# Patient Record
Sex: FEMALE | Race: WHITE | NOT HISPANIC OR LATINO | URBAN - METROPOLITAN AREA
[De-identification: names, ages, dates, MRNs, and addresses within clinical notes are randomized per-mention and may not be internally consistent; named-entity substitution may affect disease eponyms.]

---

## 2021-03-31 DIAGNOSIS — Z23 ENCOUNTER FOR IMMUNIZATION: ICD-10-CM

## 2022-02-18 ENCOUNTER — OFFICE VISIT (OUTPATIENT)
Dept: PLASTIC SURGERY | Facility: CLINIC | Age: 47
End: 2022-02-18
Payer: COMMERCIAL

## 2022-02-18 DIAGNOSIS — J34.89 NASAL OBSTRUCTION: Primary | ICD-10-CM

## 2022-02-18 PROCEDURE — 99203 OFFICE O/P NEW LOW 30 MIN: CPT | Performed by: STUDENT IN AN ORGANIZED HEALTH CARE EDUCATION/TRAINING PROGRAM

## 2022-02-18 RX ORDER — TRAZODONE HYDROCHLORIDE 50 MG/1
50 TABLET ORAL
Status: ON HOLD | COMMUNITY
End: 2022-07-18 | Stop reason: SDUPTHER

## 2022-02-18 RX ORDER — MULTIVIT-MIN/IRON FUM/FOLIC AC 7.5 MG-4
1 TABLET ORAL DAILY
COMMUNITY

## 2022-02-18 RX ORDER — ESTERIFIED ESTROGEN AND METHYLTESTOSTERONE 1.25; 2.5 MG/1; MG/1
1 TABLET ORAL DAILY
COMMUNITY
End: 2022-07-13

## 2022-02-21 NOTE — PROGRESS NOTES
Plastic Surgery Consult    Reason for visit: Patient presents for consultation for facial feminization    HPI:  Patient is a 39 y/o MTF transgendered woman who presents for facial feminization  She has multiple areas that she would like addressed including her hairline, forehead, nose, chin, and chest  I asked her what is her primary area of focus and she mentioned her nose as she also has some obstructive sleep symptoms  She is getting a sleep study in the next week  She states that she suffered multiple traumas to her nose as child and has had some nasal obstructive symptoms that are constant and unchanged with medication or seasonality  There is obvious deviation of her nose  She is interested in making it more feminine by reducing the dorsal hump, less projected  She presents for further management  ROS: 12 pt ROS negative, except as otherwise noted in HPI  PMH: none  FamHx: none  SurgHx: none  SocHx: on/off smoking for 10 years, social ETOH  Meds: hormones, finasteride  Allergies: PCN, sulfa    PE:  There were no vitals filed for this visit      General: NC/AT, breathing comfortably on RA  Neuro: CN II-XII grossly intact, symmetric reflexes  HEENT: PERRLA, EOMI, external ears normal, no lesions or deformities, neck supple, trachea midline  Respiratory: CTAB, normal respiratory effort  Cardio: RRR, normal S1, S2, no murmur, rubs, gallops  GI: soft, non-tender, non-distended  Extremities/MSK: normal alignment, mobility, gait, no edema  Skin: no rashes, lesions, subcutaneous nodules    Nasal exam:  Deviated dorsal septum to the left  Asymmetry and bulbous LLCs  +[place] bilaterally, improvement in airway passage with internal stenting with cotton swab  Intranasal exam, S-shaped septum    A/P:47 y/o MTF transgendered female who presents for facial feminization with highest priority of the nose as it is both a functional and cosmetic issue   -I discussed with her that we can straighten the nose, place  grafts to stent the internal nasal valves to improve breathing, and can make the nose more feminine by reducing dorsal hump, cephalic trim, and underrotating tip   -Most importantly, I discussed the need to stop smoking  I instructed her to stop smoking for at least 6 weeks prior at which time she will be tested with cotinine test prior to scheduling any surgical intervention            Calleen Buddle, MD   [address] [place] [address] Plastic and Reconstructive Surgery   [address]   ASIM, [address]   Office: [phone]

## 2022-07-06 ENCOUNTER — HOSPITAL ENCOUNTER (INPATIENT)
Facility: HOSPITAL | Age: 47
LOS: 6 days | Discharge: NURSING, REHAB OR LONG-TERM CARE | DRG: 481 | End: 2022-07-13
Attending: EMERGENCY MEDICINE | Admitting: ORTHOPAEDIC SURGERY
Payer: COMMERCIAL

## 2022-07-06 ENCOUNTER — APPOINTMENT (EMERGENCY)
Dept: RADIOLOGY | Facility: HOSPITAL | Age: 47
DRG: 481 | End: 2022-07-06
Payer: COMMERCIAL

## 2022-07-06 DIAGNOSIS — S72.009A FEMORAL NECK FRACTURE (HCC): ICD-10-CM

## 2022-07-06 DIAGNOSIS — S72.002A CLOSED FRACTURE OF NECK OF LEFT FEMUR, INITIAL ENCOUNTER (HCC): Primary | ICD-10-CM

## 2022-07-06 DIAGNOSIS — Z78.9 MALE-TO-FEMALE TRANSGENDER PERSON: ICD-10-CM
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
## 2022-07-06 PROCEDURE — 73552 X-RAY EXAM OF FEMUR 2/>: CPT

## 2022-07-06 PROCEDURE — 99285 EMERGENCY DEPT VISIT HI MDM: CPT | Performed by: EMERGENCY MEDICINE

## 2022-07-06 PROCEDURE — 73502 X-RAY EXAM HIP UNI 2-3 VIEWS: CPT

## 2022-07-06 PROCEDURE — 90715 TDAP VACCINE 7 YRS/> IM: CPT

## 2022-07-06 PROCEDURE — 99285 EMERGENCY DEPT VISIT HI MDM: CPT

## 2022-07-06 PROCEDURE — 90471 IMMUNIZATION ADMIN: CPT

## 2022-07-06 PROCEDURE — 71045 X-RAY EXAM CHEST 1 VIEW: CPT

## 2022-07-06 RX ORDER — FENTANYL CITRATE 50 UG/ML
50 INJECTION, SOLUTION INTRAMUSCULAR; INTRAVENOUS ONCE
Status: COMPLETED | OUTPATIENT
Start: 2022-07-06 | End: 2022-07-06

## 2022-07-06 RX ORDER — FENTANYL CITRATE 50 UG/ML
1 INJECTION, SOLUTION INTRAMUSCULAR; INTRAVENOUS ONCE
Status: COMPLETED | OUTPATIENT
Start: 2022-07-06 | End: 2022-07-06

## 2022-07-06 RX ADMIN — FENTANYL CITRATE 50 MCG: 50 INJECTION INTRAMUSCULAR; INTRAVENOUS at 22:37

## 2022-07-06 RX ADMIN — TETANUS TOXOID, REDUCED DIPHTHERIA TOXOID AND ACELLULAR PERTUSSIS VACCINE, ADSORBED 0.5 ML: 5; 2.5; 8; 8; 2.5 SUSPENSION INTRAMUSCULAR at 22:38

## 2022-07-07 ENCOUNTER — APPOINTMENT (INPATIENT)
Dept: RADIOLOGY | Facility: HOSPITAL | Age: 47
DRG: 481 | End: 2022-07-07
Payer: COMMERCIAL

## 2022-07-07 ENCOUNTER — ANESTHESIA (INPATIENT)
Dept: PERIOP | Facility: HOSPITAL | Age: 47
DRG: 481 | End: 2022-07-07
Payer: COMMERCIAL

## 2022-07-07 ENCOUNTER — ANESTHESIA EVENT (INPATIENT)
Dept: PERIOP | Facility: HOSPITAL | Age: 47
DRG: 481 | End: 2022-07-07
Payer: COMMERCIAL

## 2022-07-07 PROBLEM — G47.33 OSA (OBSTRUCTIVE SLEEP APNEA): Chronic | Status: ACTIVE | Noted: 2022-07-07

## 2022-07-07 PROBLEM — F12.90 MARIJUANA USE: Status: ACTIVE | Noted: 2022-07-07

## 2022-07-07 PROBLEM — Z78.9 MALE-TO-FEMALE TRANSGENDER PERSON: Status: ACTIVE | Noted: 2022-07-07

## 2022-07-07 PROBLEM — S72.002A CLOSED DISPLACED FRACTURE OF LEFT FEMORAL NECK (HCC): Status: ACTIVE | Noted: 2022-07-07

## 2022-07-07 PROBLEM — IMO0001 SMOKING: Status: ACTIVE | Noted: 2022-07-07

## 2022-07-07 PROBLEM — F17.200 SMOKING: Status: ACTIVE | Noted: 2022-07-07

## 2022-07-07 PROBLEM — Z01.818 PRE-OPERATIVE EXAMINATION: Status: ACTIVE | Noted: 2022-07-07

## 2022-07-07 LAB
ABO GROUP BLD: NORMAL
ABO GROUP BLD: NORMAL
ANION GAP SERPL CALCULATED.3IONS-SCNC: 5 MMOL/L (ref 4–13)
APTT PPP: 28 SECONDS (ref 23–37)
ATRIAL RATE: 74 BPM
BASOPHILS # BLD AUTO: 0.09 THOUSANDS/ΜL (ref 0–0.1)
BASOPHILS NFR BLD AUTO: 1 % (ref 0–1)
BLD GP AB SCN SERPL QL: NEGATIVE
BUN SERPL-MCNC: 17 MG/DL (ref 5–25)
CALCIUM SERPL-MCNC: 8.8 MG/DL (ref 8.3–10.1)
CHLORIDE SERPL-SCNC: 106 MMOL/L (ref 100–108)
CO2 SERPL-SCNC: 25 MMOL/L (ref 21–32)
CREAT SERPL-MCNC: 0.93 MG/DL (ref 0.6–1.3)
EOSINOPHIL # BLD AUTO: 0.19 THOUSAND/ΜL (ref 0–0.61)
EOSINOPHIL NFR BLD AUTO: 1 % (ref 0–6)
ERYTHROCYTE [DISTWIDTH] IN BLOOD BY AUTOMATED COUNT: 13 % (ref 11.6–15.1)
GLUCOSE SERPL-MCNC: 106 MG/DL (ref 65–140)
HCT VFR BLD AUTO: 38.2 % (ref 36.5–46.1)
HGB BLD-MCNC: 12.4 G/DL (ref 12–15.4)
IMM GRANULOCYTES # BLD AUTO: 0.14 THOUSAND/UL (ref 0–0.2)
IMM GRANULOCYTES NFR BLD AUTO: 1 % (ref 0–2)
INR PPP: 0.97 (ref 0.84–1.19)
LYMPHOCYTES # BLD AUTO: 2.49 THOUSANDS/ΜL (ref 0.6–4.47)
LYMPHOCYTES NFR BLD AUTO: 13 % (ref 14–44)
MCH RBC QN AUTO: 31.3 PG (ref 26.8–34.3)
MCHC RBC AUTO-ENTMCNC: 32.5 G/DL (ref 31.4–37.4)
MCV RBC AUTO: 97 FL (ref 82–98)
MONOCYTES # BLD AUTO: 0.91 THOUSAND/ΜL (ref 0.17–1.22)
MONOCYTES NFR BLD AUTO: 5 % (ref 4–12)
NEUTROPHILS # BLD AUTO: 15.05 THOUSANDS/ΜL (ref 1.85–7.62)
NEUTS SEG NFR BLD AUTO: 79 % (ref 43–75)
NRBC BLD AUTO-RTO: 0 /100 WBCS
P AXIS: 73 DEGREES
PLATELET # BLD AUTO: 284 THOUSANDS/UL (ref 149–390)
PMV BLD AUTO: 9.3 FL (ref 8.9–12.7)
POTASSIUM SERPL-SCNC: 3.8 MMOL/L (ref 3.5–5.3)
PR INTERVAL: 190 MS
PROTHROMBIN TIME: 12.5 SECONDS (ref 11.6–14.5)
QRS AXIS: 80 DEGREES
QRSD INTERVAL: 90 MS
QT INTERVAL: 374 MS
QTC INTERVAL: 415 MS
RBC # BLD AUTO: 3.96 MILLION/UL (ref 3.88–5.12)
RH BLD: POSITIVE
RH BLD: POSITIVE
SODIUM SERPL-SCNC: 136 MMOL/L (ref 136–145)
SPECIMEN EXPIRATION DATE: NORMAL
T WAVE AXIS: 83 DEGREES
VENTRICULAR RATE: 74 BPM
WBC # BLD AUTO: 18.87 THOUSAND/UL (ref 4.31–10.16)

## 2022-07-07 PROCEDURE — 99222 1ST HOSP IP/OBS MODERATE 55: CPT | Performed by: ORTHOPAEDIC SURGERY

## 2022-07-07 PROCEDURE — C1713 ANCHOR/SCREW BN/BN,TIS/BN: HCPCS | Performed by: ORTHOPAEDIC SURGERY

## 2022-07-07 PROCEDURE — 85025 COMPLETE CBC W/AUTO DIFF WBC: CPT | Performed by: EMERGENCY MEDICINE

## 2022-07-07 PROCEDURE — C1769 GUIDE WIRE: HCPCS | Performed by: ORTHOPAEDIC SURGERY

## 2022-07-07 PROCEDURE — G1004 CDSM NDSC: HCPCS

## 2022-07-07 PROCEDURE — 27236 TREAT THIGH FRACTURE: CPT | Performed by: ORTHOPAEDIC SURGERY

## 2022-07-07 PROCEDURE — 73700 CT LOWER EXTREMITY W/O DYE: CPT

## 2022-07-07 PROCEDURE — 86901 BLOOD TYPING SEROLOGIC RH(D): CPT | Performed by: PHYSICIAN ASSISTANT

## 2022-07-07 PROCEDURE — 85730 THROMBOPLASTIN TIME PARTIAL: CPT | Performed by: PHYSICIAN ASSISTANT

## 2022-07-07 PROCEDURE — 93005 ELECTROCARDIOGRAM TRACING: CPT

## 2022-07-07 PROCEDURE — 0QSC04Z REPOSITION LEFT LOWER FEMUR WITH INTERNAL FIXATION DEVICE, OPEN APPROACH: ICD-10-PCS | Performed by: ORTHOPAEDIC SURGERY

## 2022-07-07 PROCEDURE — 99253 IP/OBS CNSLTJ NEW/EST LOW 45: CPT | Performed by: INTERNAL MEDICINE

## 2022-07-07 PROCEDURE — 36415 COLL VENOUS BLD VENIPUNCTURE: CPT | Performed by: EMERGENCY MEDICINE

## 2022-07-07 PROCEDURE — 86900 BLOOD TYPING SEROLOGIC ABO: CPT | Performed by: PHYSICIAN ASSISTANT

## 2022-07-07 PROCEDURE — 80048 BASIC METABOLIC PNL TOTAL CA: CPT | Performed by: EMERGENCY MEDICINE

## 2022-07-07 PROCEDURE — 86850 RBC ANTIBODY SCREEN: CPT | Performed by: PHYSICIAN ASSISTANT

## 2022-07-07 PROCEDURE — 73502 X-RAY EXAM HIP UNI 2-3 VIEWS: CPT

## 2022-07-07 PROCEDURE — 93010 ELECTROCARDIOGRAM REPORT: CPT | Performed by: INTERNAL MEDICINE

## 2022-07-07 PROCEDURE — 85610 PROTHROMBIN TIME: CPT | Performed by: PHYSICIAN ASSISTANT

## 2022-07-07 DEVICE — DHS®/DCS® COMPRESSION SCREW 36MM-STERILE
Type: IMPLANTABLE DEVICE | Site: HIP | Status: FUNCTIONAL
Brand: DHS

## 2022-07-07 RX ORDER — FENTANYL CITRATE 50 UG/ML
INJECTION, SOLUTION INTRAMUSCULAR; INTRAVENOUS AS NEEDED
Status: DISCONTINUED | OUTPATIENT
Start: 2022-07-07 | End: 2022-07-08

## 2022-07-07 RX ORDER — CEFAZOLIN SODIUM 1 G/3ML
INJECTION, POWDER, FOR SOLUTION INTRAMUSCULAR; INTRAVENOUS AS NEEDED
Status: DISCONTINUED | OUTPATIENT
Start: 2022-07-07 | End: 2022-07-08

## 2022-07-07 RX ORDER — OXYCODONE HYDROCHLORIDE 10 MG/1
10 TABLET ORAL EVERY 4 HOURS PRN
Status: DISCONTINUED | OUTPATIENT
Start: 2022-07-07 | End: 2022-07-10

## 2022-07-07 RX ORDER — ACETAMINOPHEN 325 MG/1
975 TABLET ORAL EVERY 8 HOURS SCHEDULED
Status: DISCONTINUED | OUTPATIENT
Start: 2022-07-07 | End: 2022-07-13 | Stop reason: HOSPADM

## 2022-07-07 RX ORDER — POLYETHYLENE GLYCOL 3350 17 G/17G
17 POWDER, FOR SOLUTION ORAL DAILY
Status: DISCONTINUED | OUTPATIENT
Start: 2022-07-07 | End: 2022-07-13 | Stop reason: HOSPADM

## 2022-07-07 RX ORDER — CEFAZOLIN SODIUM 2 G/50ML
2000 SOLUTION INTRAVENOUS
Status: CANCELLED | OUTPATIENT
Start: 2022-07-07 | End: 2022-07-08

## 2022-07-07 RX ORDER — ENOXAPARIN SODIUM 100 MG/ML
40 INJECTION SUBCUTANEOUS DAILY
Status: DISCONTINUED | OUTPATIENT
Start: 2022-07-07 | End: 2022-07-08

## 2022-07-07 RX ORDER — TRAZODONE HYDROCHLORIDE 50 MG/1
50 TABLET ORAL
Status: DISCONTINUED | OUTPATIENT
Start: 2022-07-07 | End: 2022-07-13 | Stop reason: HOSPADM

## 2022-07-07 RX ORDER — ONDANSETRON 2 MG/ML
4 INJECTION INTRAMUSCULAR; INTRAVENOUS EVERY 6 HOURS PRN
Status: DISCONTINUED | OUTPATIENT
Start: 2022-07-07 | End: 2022-07-13 | Stop reason: HOSPADM

## 2022-07-07 RX ORDER — SIMETHICONE 80 MG
80 TABLET,CHEWABLE ORAL 4 TIMES DAILY PRN
Status: DISCONTINUED | OUTPATIENT
Start: 2022-07-07 | End: 2022-07-13 | Stop reason: HOSPADM

## 2022-07-07 RX ORDER — SODIUM CHLORIDE, SODIUM LACTATE, POTASSIUM CHLORIDE, CALCIUM CHLORIDE 600; 310; 30; 20 MG/100ML; MG/100ML; MG/100ML; MG/100ML
INJECTION, SOLUTION INTRAVENOUS CONTINUOUS PRN
Status: DISCONTINUED | OUTPATIENT
Start: 2022-07-07 | End: 2022-07-08

## 2022-07-07 RX ORDER — LIDOCAINE HYDROCHLORIDE 10 MG/ML
INJECTION, SOLUTION EPIDURAL; INFILTRATION; INTRACAUDAL; PERINEURAL AS NEEDED
Status: DISCONTINUED | OUTPATIENT
Start: 2022-07-07 | End: 2022-07-07

## 2022-07-07 RX ORDER — SODIUM CHLORIDE, SODIUM GLUCONATE, SODIUM ACETATE, POTASSIUM CHLORIDE, MAGNESIUM CHLORIDE, SODIUM PHOSPHATE, DIBASIC, AND POTASSIUM PHOSPHATE .53; .5; .37; .037; .03; .012; .00082 G/100ML; G/100ML; G/100ML; G/100ML; G/100ML; G/100ML; G/100ML
125 INJECTION, SOLUTION INTRAVENOUS CONTINUOUS
Status: DISCONTINUED | OUTPATIENT
Start: 2022-07-07 | End: 2022-07-08

## 2022-07-07 RX ORDER — PROPOFOL 10 MG/ML
INJECTION, EMULSION INTRAVENOUS AS NEEDED
Status: DISCONTINUED | OUTPATIENT
Start: 2022-07-07 | End: 2022-07-08

## 2022-07-07 RX ORDER — NICOTINE 21 MG/24HR
1 PATCH, TRANSDERMAL 24 HOURS TRANSDERMAL DAILY
Status: DISCONTINUED | OUTPATIENT
Start: 2022-07-07 | End: 2022-07-13 | Stop reason: HOSPADM

## 2022-07-07 RX ORDER — SENNOSIDES 8.6 MG
1 TABLET ORAL DAILY
Status: DISCONTINUED | OUTPATIENT
Start: 2022-07-07 | End: 2022-07-13 | Stop reason: HOSPADM

## 2022-07-07 RX ORDER — DOCUSATE SODIUM 100 MG/1
100 CAPSULE, LIQUID FILLED ORAL 2 TIMES DAILY
Status: DISCONTINUED | OUTPATIENT
Start: 2022-07-07 | End: 2022-07-13 | Stop reason: HOSPADM

## 2022-07-07 RX ORDER — DEXAMETHASONE SODIUM PHOSPHATE 10 MG/ML
INJECTION, SOLUTION INTRAMUSCULAR; INTRAVENOUS AS NEEDED
Status: DISCONTINUED | OUTPATIENT
Start: 2022-07-07 | End: 2022-07-08

## 2022-07-07 RX ORDER — ROCURONIUM BROMIDE 10 MG/ML
INJECTION, SOLUTION INTRAVENOUS AS NEEDED
Status: DISCONTINUED | OUTPATIENT
Start: 2022-07-07 | End: 2022-07-08

## 2022-07-07 RX ORDER — MAGNESIUM HYDROXIDE/ALUMINUM HYDROXICE/SIMETHICONE 120; 1200; 1200 MG/30ML; MG/30ML; MG/30ML
30 SUSPENSION ORAL EVERY 6 HOURS PRN
Status: DISCONTINUED | OUTPATIENT
Start: 2022-07-07 | End: 2022-07-13 | Stop reason: HOSPADM

## 2022-07-07 RX ORDER — CALCIUM CARBONATE 200(500)MG
1000 TABLET,CHEWABLE ORAL DAILY PRN
Status: DISCONTINUED | OUTPATIENT
Start: 2022-07-07 | End: 2022-07-13 | Stop reason: HOSPADM

## 2022-07-07 RX ORDER — OXYCODONE HYDROCHLORIDE 5 MG/1
5 TABLET ORAL EVERY 4 HOURS PRN
Status: DISCONTINUED | OUTPATIENT
Start: 2022-07-07 | End: 2022-07-10

## 2022-07-07 RX ORDER — HYDROMORPHONE HCL/PF 1 MG/ML
0.5 SYRINGE (ML) INJECTION
Status: DISCONTINUED | OUTPATIENT
Start: 2022-07-07 | End: 2022-07-10

## 2022-07-07 RX ADMIN — ROCURONIUM BROMIDE 50 MG: 10 INJECTION INTRAVENOUS at 23:15

## 2022-07-07 RX ADMIN — OXYCODONE HYDROCHLORIDE 10 MG: 10 TABLET ORAL at 02:24

## 2022-07-07 RX ADMIN — OXYCODONE HYDROCHLORIDE 10 MG: 10 TABLET ORAL at 07:56

## 2022-07-07 RX ADMIN — LIDOCAINE HYDROCHLORIDE 100 MG: 20 INJECTION INTRAVENOUS at 23:14

## 2022-07-07 RX ADMIN — ACETAMINOPHEN 975 MG: 325 TABLET, FILM COATED ORAL at 13:16

## 2022-07-07 RX ADMIN — SODIUM CHLORIDE, SODIUM GLUCONATE, SODIUM ACETATE, POTASSIUM CHLORIDE, MAGNESIUM CHLORIDE, SODIUM PHOSPHATE, DIBASIC, AND POTASSIUM PHOSPHATE 125 ML/HR: .53; .5; .37; .037; .03; .012; .00082 INJECTION, SOLUTION INTRAVENOUS at 04:46

## 2022-07-07 RX ADMIN — HYDROMORPHONE HYDROCHLORIDE 0.5 MG: 1 INJECTION, SOLUTION INTRAMUSCULAR; INTRAVENOUS; SUBCUTANEOUS at 05:47

## 2022-07-07 RX ADMIN — HYDROMORPHONE HYDROCHLORIDE 0.5 MG: 1 INJECTION, SOLUTION INTRAMUSCULAR; INTRAVENOUS; SUBCUTANEOUS at 02:43

## 2022-07-07 RX ADMIN — HYDROMORPHONE HYDROCHLORIDE 0.5 MG: 1 INJECTION, SOLUTION INTRAMUSCULAR; INTRAVENOUS; SUBCUTANEOUS at 08:53

## 2022-07-07 RX ADMIN — DEXAMETHASONE SODIUM PHOSPHATE 10 MG: 10 INJECTION, SOLUTION INTRAMUSCULAR; INTRAVENOUS at 23:39

## 2022-07-07 RX ADMIN — OXYCODONE HYDROCHLORIDE 10 MG: 10 TABLET ORAL at 13:15

## 2022-07-07 RX ADMIN — OXYCODONE HYDROCHLORIDE 10 MG: 10 TABLET ORAL at 20:56

## 2022-07-07 RX ADMIN — SODIUM CHLORIDE, SODIUM GLUCONATE, SODIUM ACETATE, POTASSIUM CHLORIDE, MAGNESIUM CHLORIDE, SODIUM PHOSPHATE, DIBASIC, AND POTASSIUM PHOSPHATE 125 ML/HR: .53; .5; .37; .037; .03; .012; .00082 INJECTION, SOLUTION INTRAVENOUS at 21:07

## 2022-07-07 RX ADMIN — HYDROMORPHONE HYDROCHLORIDE 0.5 MG: 1 INJECTION, SOLUTION INTRAMUSCULAR; INTRAVENOUS; SUBCUTANEOUS at 18:30

## 2022-07-07 RX ADMIN — ACETAMINOPHEN 975 MG: 325 TABLET, FILM COATED ORAL at 05:46

## 2022-07-07 RX ADMIN — FENTANYL CITRATE 50 MCG: 50 INJECTION INTRAMUSCULAR; INTRAVENOUS at 23:15

## 2022-07-07 RX ADMIN — PROPOFOL 150 MG: 10 INJECTION, EMULSION INTRAVENOUS at 23:15

## 2022-07-07 RX ADMIN — SODIUM CHLORIDE, SODIUM LACTATE, POTASSIUM CHLORIDE, AND CALCIUM CHLORIDE: .6; .31; .03; .02 INJECTION, SOLUTION INTRAVENOUS at 23:08

## 2022-07-07 RX ADMIN — CEFAZOLIN 1000 MG: 1 INJECTION, POWDER, FOR SOLUTION INTRAMUSCULAR; INTRAVENOUS at 23:18

## 2022-07-07 RX ADMIN — FENTANYL CITRATE 50 MCG: 50 INJECTION INTRAMUSCULAR; INTRAVENOUS at 23:47

## 2022-07-07 RX ADMIN — SODIUM CHLORIDE, SODIUM GLUCONATE, SODIUM ACETATE, POTASSIUM CHLORIDE, MAGNESIUM CHLORIDE, SODIUM PHOSPHATE, DIBASIC, AND POTASSIUM PHOSPHATE 125 ML/HR: .53; .5; .37; .037; .03; .012; .00082 INJECTION, SOLUTION INTRAVENOUS at 13:16

## 2022-07-07 NOTE — H&P
Orthopedics   [facility] Perez 40 y o  adult MRN: [id number]  Unit/Bed#: X ray      Chief Complaint:   left hip pain    HPI:   40 y  o adult community ambulator status post ground level fall complaining of left hip pain and inability to bear weight  She was skateboarding when she lost her balance falling onto her left side  Denies headstrike, LOC, no blood thinners  Only c/o left hip pain  Pain is dull in character, Located groin, acute in onset, constant in duration, severe in intensity, Exacerbating factors movement of hip, Remitting factors immobilization, nonradiating, no numbness or tingling, no open wounds noted  Occupation:  for [employer]  Pmhx significant for: male to female transgender, nicotine dependence     Review Of Systems:   · Skin: Normal  · Neuro: See HPI  · Musculoskeletal: See HPI  · 14 point review of systems negative except as stated above     Past Medical History:   History reviewed  No pertinent past medical history  Past Surgical History:   History reviewed  No pertinent surgical history  Family History:  Family history reviewed and non-contributory  History reviewed  No pertinent family history      Social History:  Social History     Socioeconomic History    Marital status: Single     Spouse name: None    Number of children: None    Years of education: None    Highest education level: None   Occupational History    None   Tobacco Use    Smoking status: Current Every Day Smoker     Types: Cigarettes    Smokeless tobacco: None   Substance and Sexual Activity    Alcohol use: Not Currently    Drug use: Never    Sexual activity: Never   Other Topics Concern    None   Social History Narrative    None     Social Determinants of Health     Financial Resource Strain: Not on file   Food Insecurity: Not on file   Transportation Needs: Not on file   Physical Activity: Not on file   Stress: Not on file   Social Connections: Not on file   Intimate Partner Violence: Not on file   Housing Stability: Not on file       Allergies:    Allergies   Allergen Reactions    Penicillins Rash and Fever    Sulfa Antibiotics Rash and Fever           Labs:  0   Lab Value Date/Time    HCT 38 2 07/07/2022 0008    HGB 12 4 07/07/2022 0008    WBC 18 87 (H) 07/07/2022 0008       Meds:    Current Facility-Administered Medications:     acetaminophen (TYLENOL) tablet 975 mg, 975 mg, Oral, Q8H [facility], Danielle Anton MD    aluminum-magnesium hydroxide-simethicone (MYLANTA) oral suspension 30 mL, 30 mL, Oral, Q6H PRN, Danielle Anton MD    calcium carbonate (TUMS) chewable tablet 1,000 mg, 1,000 mg, Oral, Daily PRN, Danielle Anton MD    docusate sodium (COLACE) capsule 100 mg, 100 mg, Oral, BID, Danielle Anton MD    enoxaparin (LOVENOX) subcutaneous injection 40 mg, 40 mg, Subcutaneous, Daily, Danielle Anton MD    HYDROmorphone (DILAUDID) injection 0 5 mg, 0 5 mg, Intravenous, Q3H PRN, Danielle Anton MD    multi-electrolyte (PLASMALYTE-A/ISOLYTE-S PH 7 4) IV solution, 125 mL/hr, Intravenous, Continuous, Danielle Anton MD    nicotine (NICODERM CQ) 14 mg/24hr TD 24 hr patch 1 patch, 1 patch, Transdermal, Daily, Danielle Anton MD    ondansetron [facility]) injection 4 mg, 4 mg, Intravenous, Q6H PRN, Danielle Anton MD    oxyCODONE (ROXICODONE) immediate release tablet 10 mg, 10 mg, Oral, Q4H PRN, Danielle Anton MD, 10 mg at 07/07/22 0224    oxyCODONE (ROXICODONE) IR tablet 5 mg, 5 mg, Oral, Q4H PRN, Danielle Anton MD    polyethylene glycol (MIRALAX) packet 17 g, 17 g, Oral, Daily, Danielle Anton MD    senna (SENOKOT) tablet 8 6 mg, 1 tablet, Oral, Daily, Danielle Anton MD    [facility]) chewable tablet 80 mg, 80 mg, Oral, 4x Daily PRN, Danielle Anton MD    Current Outpatient Medications:     estrogens, conjugated,-methyltestosterone (ESTRATEST) 1 25-2 5 MG per tablet, Take 1 tablet by mouth daily, Disp: , Rfl:    Multiple Vitamins-Minerals (multivitamin with minerals) tablet, Take 1 tablet by mouth daily, Disp: , Rfl:     progesterone (ENDOMETRIN) 100 MG vaginal insert, Insert 200 mg into the vagina 2 (two) times a day, Disp: , Rfl:     traZODone (DESYREL) 50 mg tablet, Take 50 mg by mouth daily at bedtime, Disp: , Rfl:     Blood Culture:   No results found for: BLOODCX    Wound Culture:   No results found for: WOUNDCULT    Ins and Outs:  No intake/output data recorded  Physical Exam:   /80 (BP Location: Right arm)   Pulse 76   Temp 98 2 °F (36 8 °C) (Oral)   Resp 18   Ht 5' 11" (1 803 m)   Wt 76 2 kg (168 lb)   SpO2 97%   BMI 23 43 kg/m²   Gen: Alert and oriented to person, place, time  HEENT: EOMI, eyes clear, moist mucus membranes, hearing intact  Respiratory: Bilateral chest rise  No audible wheezing found  Cardiovascular: Regular Rate and Rhythm  Abdomen: soft nontender/nondistended  Musculoskeletal: left lower extremity  · Skin intact  · Tender to palpation over anterior hip  · Pain with int/external rotation  · Sensation intact L3-S1  · Positive ankle dorsi/plantar flexion, EHL/FHL  · 2+ DP pulse  · No other pain upon palpation of other extremities    Radiology:   I personally reviewed the films  X-rays left hip shows transcervical femoral neck fracture    Assessment:  40 y  o adult status post fall while skateboarding with a displaced left femoral neck fracture   She would benefit from a total hip arthroplasty vs operative fixation    Plan:   · Non weight bearing left lower extremity  · CT left hip to better delineate the fx  · Analgesics for pain  · Informed consent obtained  · Pre op labs in ED  · NPO   · Anderson Catheter insertion  · Medicine consult for all medical management and preoperative risk stratification  · To OR for Anterior total hip arthroplasty vs operative fixation  · Dispo: admit to marcial Fneton MD

## 2022-07-07 NOTE — CASE MANAGEMENT
Case Management Assessment & Discharge Planning Note    Patient name Michelle Falcon  Location [address] [id number] MRN [id number]  : 1975 Date 2022       Current Admission Date: 2022  Current Admission Diagnosis:Pre-operative examination   Patient Active Problem List    Diagnosis Date Noted    Closed displaced fracture of left femoral neck ([address] ) 2022    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 2022    Pre-operative examination 2022      LOS (days): 0  Geometric Mean LOS (GMLOS) (days):   Days to GMLOS:     OBJECTIVE:    Risk of Unplanned Readmission Score: 8 16      Current admission status: Inpatient     Preferred Pharmacy:   [address]  [address]  [address] [facility] [address]  Phone: [phone] Fax: [phone] [facility], [address] [facility] [address] [facility]  Phone: [phone] Fax: [phone]    Primary Care Provider: Pt reports having a PCP via [facility] but is unable to identify their name at this time  Primary Insurance: BLUE CROSS  Secondary Insurance:     ASSESSMENT:  Active Health Care Proxies     quinones, Sarah[address] [facility] Representative - Friend   Primary Phone: [phone] (Mobile)                Patient Information  Admitted from[de-identified] Home  Mental Status: Alert  During Assessment patient was accompanied by: Not accompanied during assessment  Assessment information provided by[de-identified] Patient  Primary Caregiver: Self  Support Systems: [place] of Residence: [facility] do you live [place] [facility] [address] [place] [address] Street entry access options   Select all that apply : Stairs  Number of steps to enter home : 5  Do the steps have railings?: Yes  Type of Current Residence: Apartment  Floor Level: 2  Upon entering residence, is there a bedroom on the main floor (no further steps)?: Yes  Upon entering residence, is there a bathroom on the main floor (no further steps)?: Yes  Number of steps to 2nd floor from main floor: One Flight  In the last 12 months, was there a time when you were not able to pay the mortgage or rent on time?: No  In the last 12 months, was there a time when you did not have a steady place to sleep or slept in a shelter (including now)?: No  Homeless/housing insecurity resource given?: N/A  Living Arrangements: Lives w/ Friend  Is patient a ?: No    Activities of Daily Living Prior to Admission  Functional Status: Independent  Completes ADLs independently?: Yes  Ambulates independently?: Yes  Does patient use assisted devices?: No  Does patient currently own DME?: Yes  What DME does the patient currently own?: Straight Cane  Does patient have a history of Outpatient Therapy (PT/OT)?: No  Does the patient have a history of Short-Term Rehab?: No  Does patient have a history of HHC?: No  Does patient currently have [facility]?: No    Patient Information Continued  Income Source: Employed (Pt works FT and has the ability to work from home )  Does patient have prescription coverage?: Yes  Within the past 12 months, you worried that your food would run out before you got the money to buy more : Never true  Within the past 12 months, the food you bought just didn't last and you didn't have money to get more : Never true  Food insecurity resource given?: N/A  Does patient receive dialysis treatments?: No  Does patient have a history of substance abuse?: No  Does patient have a history of Mental Health Diagnosis?: No    Means of Transportation  Means of Transport to [facility][de-identified] Public Transportation - Bus (Pt does not have her PA 's license or own a vehicle )  In the past 12 months, has lack of transportation kept you from medical appointments or from getting medications?: No  In the past 12 months, has lack of transportation kept you from meetings, work, or from getting things needed for daily living?: No  Was application for public transport provided?: N/A    DISCHARGE DETAILS:    Discharge planning discussed with[de-identified] Patient at bedside  Freedom of Choice: Yes     CM contacted family/caregiver?: No- see comments (Declined)  Were Treatment Team discharge recommendations reviewed with patient/caregiver?: Yes  Did patient/caregiver verbalize understanding of patient care needs?: Yes  Were patient/caregiver advised of the risks associated with not following Treatment Team discharge recommendations?: Yes    Contacts  Patient Contacts: Self, Aliofe Perez  Contact Method: In Person  Reason/Outcome: Discharge [address]         Is the patient interested in [facility] at discharge?: No     Discharge Destination Plan[de-identified] Home (pending PT/OT recommendations)      Additional Comments: Pt is expected to undergo OR today with the ortho team  CM will follow for discharge planning recommendations upon return from the OR  CM will remain available

## 2022-07-07 NOTE — ED ATTENDING ATTESTATION
7/6/2022  IDenia MD, saw and evaluated the patient  I have discussed the patient with the resident/non-physician practitioner and agree with the resident's/non-physician practitioner's findings, Plan of Care, and MDM as documented in the resident's/non-physician practitioner's note, except where noted  All available labs and Radiology studies were reviewed  I was present for key portions of any procedure(s) performed by the resident/non-physician practitioner and I was immediately available to provide assistance  At this point I agree with the current assessment done in the Emergency Department  I have conducted an independent evaluation of this patient a history and physical is as follows:    ED Course     29-year-old male to female transgender, presenting to the emergency department for evaluation after fall  Patient was skateboarding when she lost her balance causing her to fall onto her left elbow and left hip  Patient denies hitting her head  Negative LOC  Patient has an abrasion to the left elbow controlled  Patient primarily complains of left hip pain  No neck pain, back pain, chest pain, abdominal pain  Ten systems reviewed and negative except as noted  The patient is resting comfortably on a stretcher in no acute respiratory distress  The patient appears nontoxic  HEENT reveals moist mucous membranes  Head is normocephalic and atraumatic  Conjunctiva and sclera are normal  Neck is nontender and supple with full range of motion to flexion, extension, lateral rotation  No meningismus appreciated  No masses are appreciated  Lungs are clear to auscultation bilaterally without any wheezes, rales or rhonchi  Heart is regular rate and rhythm without any murmurs, rubs or gallops  Abdomen is soft and nontender without any rebound or guarding  Abrasion to the olecranon surface of the left elbow  Patient has full range of motion to flexion extension and pronation and supination  Nontender to palpation over the medial and lateral epicondyle  Patient has diminished range of motion of the left hip secondary to pain  Patient is tender to palpation over the lateral aspect of the left hip  Stable pelvis  Left knee is unremarkable    Patient is awake, alert, and oriented x3  The patient has normal interaction  Cranial nerves 2-12 are intact  Motor is 5 out of 5 bilateral upper lower extremities  Normal sensation  XR hip/pelv 2-3 vws left if performed    (Results Pending)   XR femur 2 views LEFT    (Results Pending)   X-rays independently visualized by myself demonstrates a left femoral neck fracture              Critical Care Time  Procedures

## 2022-07-07 NOTE — PLAN OF CARE
Problem: MOBILITY - ADULT  Goal: Maintain or return to baseline ADL function  Description: INTERVENTIONS:  -  Assess patient's ability to carry out ADLs; assess patient's baseline for ADL function and identify physical deficits which impact ability to perform ADLs (bathing, care of mouth/teeth, toileting, grooming, dressing, etc )  - Assess/evaluate cause of self-care deficits   - Assess range of motion  - Assess patient's mobility; develop plan if impaired  - Assess patient's need for assistive devices and provide as appropriate  - Encourage maximum independence but intervene and supervise when necessary  - Involve family in performance of ADLs  - Assess for home care needs following discharge   - Consider OT consult to assist with ADL evaluation and planning for discharge  - Provide patient education as appropriate  Outcome: Progressing  Goal: Maintains/Returns to pre admission functional level  Description: INTERVENTIONS:  - Perform BMAT or MOVE assessment daily    - Set and communicate daily mobility goal to care team and patient/family/caregiver  - Collaborate with rehabilitation services on mobility goals if consulted  - Perform Range of Motion 3 times a day  - Reposition patient every 3 hours    - Dangle patient 3 times a day  - Stand patient 3 times a day  - Ambulate patient 3 times a day  - Out of bed to chair 3 times a day   - Out of bed for meals 3 times a day  - Out of bed for toileting  - Record patient progress and toleration of activity level   Outcome: Progressing     Problem: Potential for Falls  Goal: Patient will remain free of falls  Description: INTERVENTIONS:  - Educate patient/family on patient safety including physical limitations  - Instruct patient to call for assistance with activity   - Consult OT/PT to assist with strengthening/mobility   - Keep Call bell within reach  - Keep bed low and locked with side rails adjusted as appropriate  - Keep care items and personal belongings within reach  - Initiate and maintain comfort rounds  - Make Fall Risk Sign visible to staff  - Offer Toileting every  Hours, in advance of need  - Initiate/Maintain alarm  - Obtain necessary fall risk management equipment:   - Apply yellow socks and bracelet for high fall risk patients  - Consider moving patient to room near nurses station  Outcome: Progressing

## 2022-07-07 NOTE — QUICK NOTE
Orthopedics PreOp Note  Aliofe Perez 40 y o  adult MRN: [id number]  Unit/Bed#: -01      Dx:  Left displaced femoral neck fracture  Procedure:  Open reduction internal fixation left femur    Hgb:  12 4  Plt:  284   Wbc:  18 87    Na:  136  K:  3 8  Cl:  106  Co2:  24  BUN:  17  Cr:  0 93  Gluc:  106    PTT:  28  INR:  0 97  PT:  12 5        CXR:  2022  EK    Abx:  Ancef on hold prior to surgery  Type and Screen/Cross:  2022  NPO:  Midnight 2022  Consent: 2022  POA Name/ Phone: Kendell Murray (Friend)   [phone] (Mobile)  Clearance: cleared  Anticoagulation:none

## 2022-07-07 NOTE — ED NOTES
Dr Sanjuana Paz made aware that 3 attempts at natarajan catheter made by different RN's   Dr Sanjuana Paz said to wait on natarajan at this time      Xiomara Cedillo  07/07/22 7121

## 2022-07-07 NOTE — CONSULTS
[facility] 1975, 40 y o  adult MRN: [id number]  Unit/Bed#: ED 01 Encounter: [id number]  Primary Care Provider: No primary care provider on file  Date and time admitted to hospital: 7/6/2022  9:48 PM    Inpatient consult to Internal Medicine  Consult performed by: Elena Ng MD  Consult ordered by: Donald Anton MD          * Pre-operative examination  Assessment & Plan  Patient's level of activity is good  He denies having any shortness breath or dyspnea on exertion; no orthopnea  No history of liver disease  No history of operative complications however patient has had any experience with major operative procedures or anesthesia  In any case, patient having no cardiac concerns, neurologic events or hepatic disease most likely can go for intended procedure  Minimal risk  Closed displaced fracture of left femoral neck (HCC)  Assessment & Plan  As per primary orthopedic service  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  Assessment & Plan  Currently on hormonal treatment; on hold for intended surgery  Perla Fuentes is a 40 y o  adult who has past medical history significant for transgender male to female as well as depression who comes in as a patient of orthopedics due to a skateboarding accident  Patient denies any loss of consciousness  We are asked by Orthopedics to assess patient for preoperative risk stratification  Patient has not had any major surgeries  No exposure to anesthesia  However, the patient is of good health without any cardiovascular risk factors  No shortness of breath or dyspnea on exertion  No history of CAD or chest pains  No history of CVA  No history of hepatic disease      Review of Systems:    Constitutional:  Denies fever or chills   Eyes:  Denies change in visual acuity   HENT:  Denies nasal congestion or sore throat   Respiratory:  Denies cough or shortness of breath   Cardiovascular: Denies chest pain or edema   GI:  Denies abdominal pain, nausea, vomiting, bloody stools or diarrhea   :  Denies dysuria   Musculoskeletal:  Denies back pain or joint pain   Integument:  Denies rash   Neurologic:  Denies headache, focal weakness or sensory changes   Endocrine:  Denies polyuria or polydipsia   Lymphatic:  Denies swollen glands   Psychiatric:  Denies depression or anxiety     Past Medical and Surgical History:     History reviewed  No pertinent past medical history  History reviewed  No pertinent surgical history  Meds/Allergies:    (Not in a hospital admission)      Allergies: Allergies   Allergen Reactions    Penicillins Rash and Fever    Sulfa Antibiotics Rash and Fever     History:     Marital Status: Single    Substance Use History:   Social History     Substance and Sexual Activity   Alcohol Use Not Currently     Social History     Tobacco Use   Smoking Status Current Every Day Smoker    Types: Cigarettes   Smokeless Tobacco Not on file     Social History     Substance and Sexual Activity   Drug Use Never       Family History:    History reviewed  No pertinent family history  Physical Exam:     Vitals:   Blood Pressure: 136/80 (07/07/22 0015)  Pulse: 76 (07/07/22 0015)  Temperature: 98 2 °F (36 8 °C) (07/06/22 2145)  Temp Source: Oral (07/06/22 2145)  Respirations: 18 (07/07/22 0015)  Height: 5' 11" (180 3 cm) (07/06/22 2145)  Weight - Scale: 76 2 kg (168 lb) (07/06/22 2145)  SpO2: 97 % (07/07/22 0015)    Constitutional:  Well developed, well nourished, no acute distress, non-toxic appearance   Eyes:  PERRL, conjunctiva normal   HENT:  Atraumatic, external ears normal, nose normal, oropharynx moist, no pharyngeal exudates   Neck- normal range of motion, no tenderness, supple   Respiratory:  No respiratory distress, normal breath sounds, no rales, no wheezing   Cardiovascular:  Normal rate, normal rhythm, no murmurs, no gallops, no rubs   GI:  Soft, nondistended, normal bowel sounds, nontender, no organomegaly, no mass, no rebound, no guarding   :  No costovertebral angle tenderness   Musculoskeletal:  No edema, tenderness left hip, shortened left lower extremity  Back- no tenderness  Integument:  Well hydrated, no rash   Lymphatic:  No lymphadenopathy noted   Neurologic:  Alert & oriented x 3, CN 2-12 normal, normal motor function, normal sensory function, no focal deficits noted   Psychiatric:  Speech and behavior appropriate       Lab Results: I Have Reviewed All Lab Data Below:    Results from last 7 days   Lab Units 07/07/22  0008   WBC Thousand/uL 18 87*   HEMOGLOBIN g/dL 12 4   HEMATOCRIT % 38 2   PLATELETS Thousands/uL 284   NEUTROS PCT % 79*   LYMPHS PCT % 13*   MONOS PCT % 5   EOS PCT % 1     Results from last 7 days   Lab Units 07/07/22  0008   POTASSIUM mmol/L 3 8   CHLORIDE mmol/L 106   CO2 mmol/L 25   BUN mg/dL 17   CREATININE mg/dL 0 93   CALCIUM mg/dL 8 8           * Additional Pertinent Lab Tests Reviewed: [place] [address] Admission Reviewed    Imaging: I have personally reviewed pertinent reports  No results found  Counseling / Coordination of Care Time: 15 minutes  Greater than 50% of total time spent on patient counseling and coordination of care  Collaboration of Care:  Were Recommendations Directly Discussed with Primary Treatment Team? - Yes     ** Please Note: Dragon 360 Dictation speech to text software was used in the creation of this document **

## 2022-07-07 NOTE — OCCUPATIONAL THERAPY NOTE
Occupational Therapy         Patient Name: Gaby Fulton  Today's Date: 7/7/2022 07/07/22 0700   OT Last Visit   OT Visit Date 07/07/22   Note Type   Note type Cancelled Session   Cancel Reasons Patient to operating room     Jarek Wright OT

## 2022-07-07 NOTE — ED PROVIDER NOTES
History  Chief Complaint   Patient presents with    Hip Injury - Major     Pt fell onto L side while skateboarding, pt now c/o hip pain, - headstrike      81-year-old transgender female patient presenting with left hip injury status post skateboarding  Patient states 30 minutes prior to arrival, she was skateboarding and she fell on her left side  Patient states that she fell on her left elbow and her left hip  Patient states that she was able to get back up but was not able to bear weight on her left leg  Patient also has an abrasion on her left elbow  Patient received 100 mg of fentanyl on the way to ED  Denies a injury to knee, left ankle, head, or any other injuries  Denies fever, chest pain, shortness of breath, abd pain, n/v           Prior to Admission Medications   Prescriptions Last Dose Informant Patient Reported? Taking? Multiple Vitamins-Minerals (multivitamin with minerals) tablet  Self Yes No   Sig: Take 1 tablet by mouth daily   estrogens, conjugated,-methyltestosterone (ESTRATEST) 1 25-2 5 MG per tablet  Self Yes No   Sig: Take 1 tablet by mouth daily   progesterone (ENDOMETRIN) 100 MG vaginal insert  Self Yes No   Sig: Insert 200 mg into the vagina 2 (two) times a day   traZODone (DESYREL) 50 mg tablet  Self Yes No   Sig: Take 50 mg by mouth daily at bedtime      Facility-Administered Medications: None       History reviewed  No pertinent past medical history  History reviewed  No pertinent surgical history  History reviewed  No pertinent family history  I have reviewed and agree with the history as documented  E-Cigarette/Vaping     E-Cigarette/Vaping Substances     Social History     Tobacco Use    Smoking status: Current Every Day Smoker     Types: Cigarettes   Substance Use Topics    Alcohol use: Not Currently    Drug use: Never        Review of Systems   Musculoskeletal:        Left hip pain, left elbow abrasion   Neurological: Negative for syncope     All other systems reviewed and are negative  Physical Exam  ED Triage Vitals   Temperature Pulse Respirations Blood Pressure SpO2   07/06/22 2145 07/06/22 2145 07/06/22 2145 07/06/22 2145 07/06/22 2145   98 2 °F (36 8 °C) 81 20 130/78 100 %      Temp Source Heart Rate Source Patient Position - Orthostatic VS BP Location FiO2 (%)   07/06/22 2145 07/06/22 2145 07/07/22 0015 07/07/22 0015 --   Oral Monitor Lying Right arm       Pain Score       07/06/22 2145       10 - Worst Possible Pain             Orthostatic Vital Signs  Vitals:    07/07/22 0600 07/07/22 0700 07/07/22 0759 07/07/22 1413   BP: 101/60 102/57 127/72 113/65   Pulse: 74 68 85 81   Patient Position - Orthostatic VS: Lying          Physical Exam  Vitals reviewed  Constitutional:       Appearance: Normal appearance  HENT:      Head: Normocephalic and atraumatic  Nose: Nose normal       Mouth/Throat:      Mouth: Mucous membranes are moist       Pharynx: Oropharynx is clear  Eyes:      Extraocular Movements: Extraocular movements intact  Conjunctiva/sclera: Conjunctivae normal    Cardiovascular:      Rate and Rhythm: Normal rate and regular rhythm  Pulses: Normal pulses  Heart sounds: Normal heart sounds  Pulmonary:      Effort: Pulmonary effort is normal       Breath sounds: Normal breath sounds  Abdominal:      General: Bowel sounds are normal       Palpations: Abdomen is soft  Tenderness: There is no abdominal tenderness  Musculoskeletal:         General: Tenderness (Tenderness to left lateral upper thigh, left hip) and signs of injury present  No deformity (No obvious deformity)  Cervical back: Normal range of motion  Comments: Limited range of motion of left hip  Mild rotation but limited flexion, extension, abduction  Skin:     General: Skin is warm and dry  Comments: Left elbow abrasion   Neurological:      General: No focal deficit present        Mental Status: She is alert and oriented to person, place, and time  Mental status is at baseline           ED Medications  Medications   multi-electrolyte (PLASMALYTE-A/ISOLYTE-S PH 7 4) IV solution (125 mL/hr Intravenous New Bag 7/7/22 1316)   docusate sodium (COLACE) capsule 100 mg (100 mg Oral Not Given 7/7/22 0800)   senna (SENOKOT) tablet 8 6 mg (8 6 mg Oral Not Given 7/7/22 0801)   polyethylene glycol (MIRALAX) packet 17 g (17 g Oral Not Given 7/7/22 0801)   ondansetron (ZOFRAN) injection 4 mg (has no administration in time range)   aluminum-magnesium hydroxide-simethicone (MYLANTA) oral suspension 30 mL (has no administration in time range)   calcium carbonate (TUMS) chewable tablet 1,000 mg (has no administration in time range)   simethicone (MYLICON) chewable tablet 80 mg (has no administration in time range)   nicotine (NICODERM CQ) 14 mg/24hr TD 24 hr patch 1 patch (1 patch Transdermal Not Given 7/7/22 0801)   enoxaparin (LOVENOX) subcutaneous injection 40 mg (40 mg Subcutaneous Not Given 7/7/22 0800)   acetaminophen (TYLENOL) tablet 975 mg (975 mg Oral Given 7/7/22 1316)   oxyCODONE (ROXICODONE) IR tablet 5 mg (has no administration in time range)   oxyCODONE (ROXICODONE) immediate release tablet 10 mg (10 mg Oral Given 7/7/22 1315)   HYDROmorphone (DILAUDID) injection 0 5 mg (0 5 mg Intravenous Given 7/7/22 0853)   traZODone (DESYREL) tablet 50 mg (has no administration in time range)   multivitamin-minerals (CENTRUM) tablet 1 tablet (1 tablet Oral Not Given 7/7/22 0801)   fentanyl citrate (PF) (FOR EMS ONLY) 100 mcg/2 mL injection 100 mcg (0 mcg Does not apply Given to EMS 7/6/22 2232)   fentanyl citrate (PF) 100 MCG/2ML 50 mcg (50 mcg Intravenous Given 7/6/22 2237)   tetanus-diphtheria-acellular pertussis (BOOSTRIX) IM injection 0 5 mL (0 5 mL Intramuscular Given 7/6/22 2238)       Diagnostic Studies  Results Reviewed     Procedure Component Value Units Date/Time    Protime-INR [[id number]]  (Normal) Collected: 07/07/22 1872    Lab Status: Final result Specimen: Blood from Arm, Left Updated: 07/07/22 0530     Protime 12 5 seconds      INR 0 97    APTT [[id number]]  (Normal) Collected: 07/07/22 0444    Lab Status: Final result Specimen: Blood from Arm, Left Updated: 07/07/22 0530     PTT 28 seconds     Basic metabolic panel [[id number]] Collected: 07/07/22 0008    Lab Status: Final result Specimen: Blood from Arm, Left Updated: 07/07/22 0042     Sodium 136 mmol/L      Potassium 3 8 mmol/L      Chloride 106 mmol/L      CO2 25 mmol/L      ANION GAP 5 mmol/L      BUN 17 mg/dL      Creatinine 0 93 mg/dL      Glucose 106 mg/dL      Calcium 8 8 mg/dL      eGFR --    Narrative:      Notes:     1  eGFR calculation is only valid for adults 18 years and older  2  EGFR calculation cannot be performed for patients who are transgender, non-binary, or whose legal sex, sex at birth, and gender identity differ  CBC and differential [[id number]]  (Abnormal) Collected: 07/07/22 0008    Lab Status: Final result Specimen: Blood from Arm, Left Updated: 07/07/22 0019     WBC 18 87 Thousand/uL      RBC 3 96 Million/uL      Hemoglobin 12 4 g/dL      Hematocrit 38 2 %      MCV 97 fL      MCH 31 3 pg      MCHC 32 5 g/dL      RDW 13 0 %      MPV 9 3 fL      Platelets 737 Thousands/uL      nRBC 0 /100 WBCs      Neutrophils Relative 79 %      Immat GRANS % 1 %      Lymphocytes Relative 13 %      Monocytes Relative 5 %      Eosinophils Relative 1 %      Basophils Relative 1 %      Neutrophils Absolute 15 05 Thousands/µL      Immature Grans Absolute 0 14 Thousand/uL      Lymphocytes Absolute 2 49 Thousands/µL      Monocytes Absolute 0 91 Thousand/µL      Eosinophils Absolute 0 19 Thousand/µL      Basophils Absolute 0 09 Thousands/µL                  CT lower extremity wo contrast left   Final Result by Ana Diaz, DO (07/07 5799)   FINDINGS/IMPRESSION:      OSSEOUS STRUCTURES:  Comminuted fracture of the left femoral neck with posterior angulation  No hip dislocation    Bones otherwise appear intact  VISUALIZED MUSCULATURE:  Unremarkable  SOFT TISSUES:  Soft tissue swelling about the left hip      OTHER PERTINENT FINDINGS:  None  Workstation performed: [id number]         XR hip/pelv 2-3 vws left if performed   Final Result by Wilfred Kerr DO (07/07 1305)      Mildly displaced left proximal femoral fracture junction of the basicervical neck and greater trochanteric region  Workstation performed: [id number]         XR femur 2 views LEFT   Final Result by Wilfred Kerr DO (07/07 1301)      Negative for distal left femoral fracture  Please see separate report of left hip also on this date  Workstation performed: [id number]         XR chest 1 view   Final Result by Wilfred Kerr DO (07/07 1307)      No acute cardiopulmonary disease  Workstation performed: [id number]               Procedures  Procedures      ED Course                                       MDM  Number of Diagnoses or Management Options  Fall  Femoral neck fracture [facility])  Diagnosis management comments: 41 y/o transgender female patient presenting with left hip and left thigh injury s/p fall onset today  On exam, patient has limited ROM of left hip  Xray positive for left femoral neck fx  Labs show leukocytosis  Admitted to orthopedics for left femoral neck fracture  Pain tx with fentanyl          Amount and/or Complexity of Data Reviewed  Clinical lab tests: ordered and reviewed  Tests in the radiology section of CPT®: ordered and reviewed        Disposition  Final diagnoses:   Femoral neck fracture ([facility] [address] )   Fall     Time reflects when diagnosis was documented in both MDM as applicable and the Disposition within this note     Time User Action Codes Description Comment    7/7/2022  1:07 AM Marina Anton Add [S72 002A] Closed fracture of neck of left femur, initial encounter ([facility] [address] )     7/7/2022  5:13 PM Elta Day Nghia Add [S72 009A] Femoral neck fracture ([address] )     7/7/2022  5:13 PM Shailesh CONTRERAS [facility] Add [G05  XXXA] Fall       ED Disposition     ED Disposition   Admit    Condition   Stable    Date/Time   Thu Jul 7, 2022  5:12 PM    Comment   Case was discussed with Dr Sanjuana Paz and the patient's admission status was agreed to be inpatient status to the service of Dr Mariia Gar           Follow-up Information    None         Current Discharge Medication List      CONTINUE these medications which have NOT CHANGED    Details   estrogens, conjugated,-methyltestosterone (ESTRATEST) 1 25-2 5 MG per tablet Take 1 tablet by mouth daily      Multiple Vitamins-Minerals (multivitamin with minerals) tablet Take 1 tablet by mouth daily      progesterone (ENDOMETRIN) 100 MG vaginal insert Insert 200 mg into the vagina 2 (two) times a day      traZODone (DESYREL) 50 mg tablet Take 50 mg by mouth daily at bedtime           Outpatient Discharge Orders   Comprehensive metabolic panel   Standing Status: Future Standing Exp  Date: 07/07/23     Sedimentation rate, automated   Standing Status: Future Standing Exp  Date: 07/07/23     TSH, 3rd generation   Standing Status: Future Standing Exp  Date: 07/07/23     PTH, intact   Standing Status: Future Standing Exp  Date: 07/07/23       PDMP Review     None           ED Provider  Attending physically available and evaluated Michelle Falcon I managed the patient along with the ED Attending      Electronically Signed by         Magui García MD  07/07/22 0488

## 2022-07-08 LAB
ANION GAP SERPL CALCULATED.3IONS-SCNC: 11 MMOL/L (ref 4–13)
BASOPHILS # BLD AUTO: 0.01 THOUSANDS/ΜL (ref 0–0.1)
BASOPHILS NFR BLD AUTO: 0 % (ref 0–1)
BUN SERPL-MCNC: 7 MG/DL (ref 5–25)
CALCIUM SERPL-MCNC: 7 MG/DL (ref 8.3–10.1)
CHLORIDE SERPL-SCNC: 102 MMOL/L (ref 100–108)
CO2 SERPL-SCNC: 23 MMOL/L (ref 21–32)
CREAT SERPL-MCNC: 0.58 MG/DL (ref 0.6–1.3)
EOSINOPHIL # BLD AUTO: 0 THOUSAND/ΜL (ref 0–0.61)
EOSINOPHIL NFR BLD AUTO: 0 % (ref 0–6)
ERYTHROCYTE [DISTWIDTH] IN BLOOD BY AUTOMATED COUNT: 12.6 % (ref 11.6–15.1)
GLUCOSE SERPL-MCNC: 119 MG/DL (ref 65–140)
HCT VFR BLD AUTO: 26.5 % (ref 36.5–46.1)
HGB BLD-MCNC: 8.9 G/DL (ref 12–15.4)
IMM GRANULOCYTES # BLD AUTO: 0.06 THOUSAND/UL (ref 0–0.2)
IMM GRANULOCYTES NFR BLD AUTO: 1 % (ref 0–2)
LYMPHOCYTES # BLD AUTO: 0.8 THOUSANDS/ΜL (ref 0.6–4.47)
LYMPHOCYTES NFR BLD AUTO: 9 % (ref 14–44)
MCH RBC QN AUTO: 31.6 PG (ref 26.8–34.3)
MCHC RBC AUTO-ENTMCNC: 33.6 G/DL (ref 31.4–37.4)
MCV RBC AUTO: 94 FL (ref 82–98)
MONOCYTES # BLD AUTO: 0.51 THOUSAND/ΜL (ref 0.17–1.22)
MONOCYTES NFR BLD AUTO: 6 % (ref 4–12)
NEUTROPHILS # BLD AUTO: 7.76 THOUSANDS/ΜL (ref 1.85–7.62)
NEUTS SEG NFR BLD AUTO: 84 % (ref 43–75)
NRBC BLD AUTO-RTO: 0 /100 WBCS
PLATELET # BLD AUTO: 200 THOUSANDS/UL (ref 149–390)
PMV BLD AUTO: 9.4 FL (ref 8.9–12.7)
POTASSIUM SERPL-SCNC: 4.3 MMOL/L (ref 3.5–5.3)
RBC # BLD AUTO: 2.82 MILLION/UL (ref 3.88–5.12)
SODIUM SERPL-SCNC: 136 MMOL/L (ref 136–145)
WBC # BLD AUTO: 9.14 THOUSAND/UL (ref 4.31–10.16)

## 2022-07-08 PROCEDURE — 85025 COMPLETE CBC W/AUTO DIFF WBC: CPT | Performed by: PHYSICIAN ASSISTANT

## 2022-07-08 PROCEDURE — 97163 PT EVAL HIGH COMPLEX 45 MIN: CPT

## 2022-07-08 PROCEDURE — 80048 BASIC METABOLIC PNL TOTAL CA: CPT | Performed by: PHYSICIAN ASSISTANT

## 2022-07-08 PROCEDURE — NC001 PR NO CHARGE: Performed by: ORTHOPAEDIC SURGERY

## 2022-07-08 PROCEDURE — 97167 OT EVAL HIGH COMPLEX 60 MIN: CPT

## 2022-07-08 DEVICE — 4.5MM CORTEX SCREW 40MM: Type: IMPLANTABLE DEVICE | Site: HIP | Status: FUNCTIONAL

## 2022-07-08 DEVICE — DHS®/DCS® ONE-STEP LAG SCREW 12.7MM THREAD/95MM-STERILE
Type: IMPLANTABLE DEVICE | Site: HIP | Status: FUNCTIONAL
Brand: DHS

## 2022-07-08 DEVICE — 130 DEG DHS PLATE-STD BARREL 3 HOLES/62MM-STERILE
Type: IMPLANTABLE DEVICE | Site: HIP | Status: FUNCTIONAL
Brand: DHS

## 2022-07-08 DEVICE — 4.5MM CORTEX SCREW 38MM: Type: IMPLANTABLE DEVICE | Site: HIP | Status: FUNCTIONAL

## 2022-07-08 DEVICE — 6.5MM CANNULATED SCREW 16MM THREAD 95MM: Type: IMPLANTABLE DEVICE | Site: HIP | Status: FUNCTIONAL

## 2022-07-08 RX ORDER — HYDROMORPHONE HCL/PF 1 MG/ML
SYRINGE (ML) INJECTION AS NEEDED
Status: DISCONTINUED | OUTPATIENT
Start: 2022-07-08 | End: 2022-07-08

## 2022-07-08 RX ORDER — MAGNESIUM HYDROXIDE 1200 MG/15ML
LIQUID ORAL AS NEEDED
Status: DISCONTINUED | OUTPATIENT
Start: 2022-07-08 | End: 2022-07-08 | Stop reason: HOSPADM

## 2022-07-08 RX ORDER — FENTANYL CITRATE/PF 50 MCG/ML
50 SYRINGE (ML) INJECTION
Status: COMPLETED | OUTPATIENT
Start: 2022-07-08 | End: 2022-07-08

## 2022-07-08 RX ORDER — ONDANSETRON 2 MG/ML
INJECTION INTRAMUSCULAR; INTRAVENOUS AS NEEDED
Status: DISCONTINUED | OUTPATIENT
Start: 2022-07-08 | End: 2022-07-08

## 2022-07-08 RX ORDER — HYDROMORPHONE HCL/PF 1 MG/ML
0.5 SYRINGE (ML) INJECTION
Status: COMPLETED | OUTPATIENT
Start: 2022-07-08 | End: 2022-07-08

## 2022-07-08 RX ORDER — ONDANSETRON 2 MG/ML
4 INJECTION INTRAMUSCULAR; INTRAVENOUS ONCE AS NEEDED
Status: DISCONTINUED | OUTPATIENT
Start: 2022-07-08 | End: 2022-07-08

## 2022-07-08 RX ORDER — ENOXAPARIN SODIUM 100 MG/ML
30 INJECTION SUBCUTANEOUS EVERY 12 HOURS SCHEDULED
Status: DISCONTINUED | OUTPATIENT
Start: 2022-07-09 | End: 2022-07-13 | Stop reason: HOSPADM

## 2022-07-08 RX ORDER — CEFAZOLIN SODIUM 2 G/50ML
2000 SOLUTION INTRAVENOUS EVERY 8 HOURS
Status: COMPLETED | OUTPATIENT
Start: 2022-07-08 | End: 2022-07-08

## 2022-07-08 RX ORDER — DIPHENHYDRAMINE HYDROCHLORIDE 50 MG/ML
12.5 INJECTION INTRAMUSCULAR; INTRAVENOUS ONCE AS NEEDED
Status: DISCONTINUED | OUTPATIENT
Start: 2022-07-08 | End: 2022-07-08

## 2022-07-08 RX ORDER — MEPERIDINE HYDROCHLORIDE 25 MG/ML
12.5 INJECTION INTRAMUSCULAR; INTRAVENOUS; SUBCUTANEOUS
Status: DISCONTINUED | OUTPATIENT
Start: 2022-07-08 | End: 2022-07-08

## 2022-07-08 RX ORDER — GLYCOPYRROLATE 0.2 MG/ML
INJECTION INTRAMUSCULAR; INTRAVENOUS AS NEEDED
Status: DISCONTINUED | OUTPATIENT
Start: 2022-07-08 | End: 2022-07-08

## 2022-07-08 RX ORDER — METOCLOPRAMIDE HYDROCHLORIDE 5 MG/ML
10 INJECTION INTRAMUSCULAR; INTRAVENOUS ONCE AS NEEDED
Status: DISCONTINUED | OUTPATIENT
Start: 2022-07-08 | End: 2022-07-08

## 2022-07-08 RX ORDER — NEOSTIGMINE METHYLSULFATE 1 MG/ML
INJECTION INTRAVENOUS AS NEEDED
Status: DISCONTINUED | OUTPATIENT
Start: 2022-07-08 | End: 2022-07-08

## 2022-07-08 RX ADMIN — FENTANYL CITRATE 50 MCG: 50 INJECTION INTRAMUSCULAR; INTRAVENOUS at 00:13

## 2022-07-08 RX ADMIN — OXYCODONE HYDROCHLORIDE 10 MG: 10 TABLET ORAL at 15:52

## 2022-07-08 RX ADMIN — SENNOSIDES 8.6 MG: 8.6 TABLET, FILM COATED ORAL at 09:57

## 2022-07-08 RX ADMIN — OXYCODONE HYDROCHLORIDE 10 MG: 10 TABLET ORAL at 21:01

## 2022-07-08 RX ADMIN — NEOSTIGMINE METHYLSULFATE 2 MG: 1 INJECTION INTRAVENOUS at 02:15

## 2022-07-08 RX ADMIN — NEOSTIGMINE METHYLSULFATE 2 MG: 1 INJECTION INTRAVENOUS at 02:17

## 2022-07-08 RX ADMIN — HYDROMORPHONE HYDROCHLORIDE 0.5 MG: 1 INJECTION, SOLUTION INTRAMUSCULAR; INTRAVENOUS; SUBCUTANEOUS at 02:48

## 2022-07-08 RX ADMIN — ACETAMINOPHEN 975 MG: 325 TABLET, FILM COATED ORAL at 21:02

## 2022-07-08 RX ADMIN — HYDROMORPHONE HYDROCHLORIDE 0.5 MG: 1 INJECTION, SOLUTION INTRAMUSCULAR; INTRAVENOUS; SUBCUTANEOUS at 02:41

## 2022-07-08 RX ADMIN — ROCURONIUM BROMIDE 20 MG: 10 INJECTION INTRAVENOUS at 01:09

## 2022-07-08 RX ADMIN — TRAZODONE HYDROCHLORIDE 50 MG: 50 TABLET ORAL at 21:01

## 2022-07-08 RX ADMIN — HYDROMORPHONE HYDROCHLORIDE 0.5 MG: 1 INJECTION, SOLUTION INTRAMUSCULAR; INTRAVENOUS; SUBCUTANEOUS at 01:16

## 2022-07-08 RX ADMIN — ONDANSETRON 4 MG: 2 INJECTION INTRAMUSCULAR; INTRAVENOUS at 01:55

## 2022-07-08 RX ADMIN — NICOTINE 1 PATCH: 14 PATCH, EXTENDED RELEASE TRANSDERMAL at 09:57

## 2022-07-08 RX ADMIN — SODIUM CHLORIDE, SODIUM LACTATE, POTASSIUM CHLORIDE, AND CALCIUM CHLORIDE: .6; .31; .03; .02 INJECTION, SOLUTION INTRAVENOUS at 01:35

## 2022-07-08 RX ADMIN — HYDROMORPHONE HYDROCHLORIDE 0.5 MG: 1 INJECTION, SOLUTION INTRAMUSCULAR; INTRAVENOUS; SUBCUTANEOUS at 01:46

## 2022-07-08 RX ADMIN — GLYCOPYRROLATE 0.4 MG: 0.2 INJECTION, SOLUTION INTRAMUSCULAR; INTRAVENOUS at 02:17

## 2022-07-08 RX ADMIN — HYDROMORPHONE HYDROCHLORIDE 0.5 MG: 1 INJECTION, SOLUTION INTRAMUSCULAR; INTRAVENOUS; SUBCUTANEOUS at 03:10

## 2022-07-08 RX ADMIN — SODIUM CHLORIDE, SODIUM GLUCONATE, SODIUM ACETATE, POTASSIUM CHLORIDE, MAGNESIUM CHLORIDE, SODIUM PHOSPHATE, DIBASIC, AND POTASSIUM PHOSPHATE 125 ML/HR: .53; .5; .37; .037; .03; .012; .00082 INJECTION, SOLUTION INTRAVENOUS at 03:08

## 2022-07-08 RX ADMIN — CEFAZOLIN SODIUM 2000 MG: 2 SOLUTION INTRAVENOUS at 06:02

## 2022-07-08 RX ADMIN — MULTIPLE VITAMINS W/ MINERALS TAB 1 TABLET: TAB ORAL at 09:57

## 2022-07-08 RX ADMIN — ROCURONIUM BROMIDE 10 MG: 10 INJECTION INTRAVENOUS at 00:34

## 2022-07-08 RX ADMIN — Medication 50 MCG: at 02:34

## 2022-07-08 RX ADMIN — FENTANYL CITRATE 50 MCG: 50 INJECTION INTRAMUSCULAR; INTRAVENOUS at 00:33

## 2022-07-08 RX ADMIN — SODIUM CHLORIDE, SODIUM GLUCONATE, SODIUM ACETATE, POTASSIUM CHLORIDE, MAGNESIUM CHLORIDE, SODIUM PHOSPHATE, DIBASIC, AND POTASSIUM PHOSPHATE 125 ML/HR: .53; .5; .37; .037; .03; .012; .00082 INJECTION, SOLUTION INTRAVENOUS at 11:34

## 2022-07-08 RX ADMIN — OXYCODONE HYDROCHLORIDE 10 MG: 10 TABLET ORAL at 06:02

## 2022-07-08 RX ADMIN — ACETAMINOPHEN 975 MG: 325 TABLET, FILM COATED ORAL at 06:02

## 2022-07-08 RX ADMIN — HYDROMORPHONE HYDROCHLORIDE 0.5 MG: 1 INJECTION, SOLUTION INTRAMUSCULAR; INTRAVENOUS; SUBCUTANEOUS at 11:30

## 2022-07-08 RX ADMIN — CEFAZOLIN SODIUM 2000 MG: 2 SOLUTION INTRAVENOUS at 14:22

## 2022-07-08 RX ADMIN — DOCUSATE SODIUM 100 MG: 100 CAPSULE, LIQUID FILLED ORAL at 09:57

## 2022-07-08 RX ADMIN — ACETAMINOPHEN 975 MG: 325 TABLET, FILM COATED ORAL at 14:20

## 2022-07-08 RX ADMIN — Medication 50 MCG: at 02:39

## 2022-07-08 RX ADMIN — HYDROMORPHONE HYDROCHLORIDE 0.5 MG: 1 INJECTION, SOLUTION INTRAMUSCULAR; INTRAVENOUS; SUBCUTANEOUS at 03:02

## 2022-07-08 RX ADMIN — ROCURONIUM BROMIDE 20 MG: 10 INJECTION INTRAVENOUS at 00:01

## 2022-07-08 RX ADMIN — OXYCODONE HYDROCHLORIDE 10 MG: 10 TABLET ORAL at 10:10

## 2022-07-08 RX ADMIN — POLYETHYLENE GLYCOL 3350 17 G: 17 POWDER, FOR SOLUTION ORAL at 09:57

## 2022-07-08 RX ADMIN — GLYCOPYRROLATE 0.4 MG: 0.2 INJECTION, SOLUTION INTRAMUSCULAR; INTRAVENOUS at 02:15

## 2022-07-08 RX ADMIN — SUGAMMADEX 200 MG: 100 INJECTION, SOLUTION INTRAVENOUS at 02:21

## 2022-07-08 NOTE — DISCHARGE INSTRUCTIONS
Discharge Instructions - Orthopedics  Kath Perez 40 y o  adult MRN: [id number]  Unit/Bed#: -01    Weight Bearing Status: Toe touch weight bearing left lower extremity    DVT prophylaxis  Lovenox for 6 weeks    Pain:  Continue analgesics as directed    Dressing Instructions:   Please keep clean, dry and intact until follow up     Appt Instructions: If you do not have your appointment, please call the clinic at [phone] t  Otherwise followup as scheduled     Contact the office sooner if you experience any increased numbness/tingling in the extremities        Miscellaneous:  F/u at 2 weeks postop

## 2022-07-08 NOTE — PLAN OF CARE
Problem: MOBILITY - ADULT  Goal: Maintain or return to baseline ADL function  Description: INTERVENTIONS:  -  Assess patient's ability to carry out ADLs; assess patient's baseline for ADL function and identify physical deficits which impact ability to perform ADLs (bathing, care of mouth/teeth, toileting, grooming, dressing, etc )  - Assess/evaluate cause of self-care deficits   - Assess range of motion  - Assess patient's mobility; develop plan if impaired  - Assess patient's need for assistive devices and provide as appropriate  - Encourage maximum independence but intervene and supervise when necessary  - Involve family in performance of ADLs  - Assess for home care needs following discharge   - Consider OT consult to assist with ADL evaluation and planning for discharge  - Provide patient education as appropriate  7/8/2022 0408 by Laney Lovelace RN  Outcome: Progressing  7/8/2022 0407 by Laney Lovelace RN  Outcome: Progressing  Goal: Maintains/Returns to pre admission functional level  Description: INTERVENTIONS:  - Perform BMAT or MOVE assessment daily    - Set and communicate daily mobility goal to care team and patient/family/caregiver  - Collaborate with rehabilitation services on mobility goals if consulted  - Perform Range of Motion **3* times a day  - Reposition patient every *2** hours    - Dangle patient *3** times a day  - Stand patient **3* times a day  - Ambulate patient *3** times a day  - Out of bed to chair **3* times a day   - Out of bed for meals *3** times a day  - Out of bed for toileting  - Record patient progress and toleration of activity level   7/8/2022 0408 by Laney Lovelace RN  Outcome: Progressing  7/8/2022 0407 by Laney Lovelace RN  Outcome: Progressing

## 2022-07-08 NOTE — PHYSICAL THERAPY NOTE
Physical Therapy Evaluation     Patient's Name: Consuelo Catherine    Admitting Diagnosis  Closed fracture of neck of left femur, initial encounter ([facility] ) [S72 002A]  Major injury of hip [S79 547A]    Problem List  Patient Active Problem List   Diagnosis    Closed displaced fracture of left femoral neck (HCC)    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    Pre-operative examination    Smoking    Marijuana use    DISHA (obstructive sleep apnea)       Past Medical History  History reviewed  No pertinent past medical history  Past Surgical History  History reviewed  No pertinent surgical history  07/08/22 1142   PT Last Visit   PT Visit Date 07/08/22   Note Type   Note type Evaluation   Pain Assessment   Pain Assessment Tool 0-10   Pain Score No Pain  (@ rest, increases with mobility)   Hospital Pain Intervention(s) Repositioned; Ambulation/increased activity; Emotional support   Restrictions/Precautions   Weight Bearing Precautions Per Order Yes   LLE Weight Bearing Per Order TTWB   Other Precautions Multiple lines; Fall Risk;Pain;WBS   Home Living   Type of Home Apartment   Home Layout One level;Performs ADLs on one level; Able to live on main level with bedroom/bathroom;Stairs to enter with rails  ([address] building + additional 20 steps to 2nd floor apartment)   Bathroom Shower/Tub Tub/shower unit   [address]   (denies)   Prior Function   Level of [place] Independent with ADLs and functional mobility   Lives With Friend(s)  (roommate; reports that roommate is "never home")   Receives Help From Friend(s)   ADL Assistance Independent   IADLs Independent   Falls in the last 6 months 1 to 4  (leading to current admission)   Vocational Full time employment   General   Family/Caregiver Present No   Cognition   Overall Cognitive Status WFL   Arousal/Participation Cooperative   Orientation Level Oriented X4   Memory Within functional limits   Following Commands Follows one step commands without difficulty   Comments pt very pleasant and cooperative to participate in therapy session   RLE Assessment   RLE Assessment   (functionally 4+/5)   LLE Assessment   LLE Assessment   (functionally 3/5 limited 2* pain)   Bed Mobility   Supine to Sit 5  Supervision   Additional items Assist x 1;HOB elevated; Bedrails; Increased time required  (pt assists LLE with RLE)   Sit to Supine Unable to assess   Additional Comments pt supine in bed upon arrival  Pt returned to bedside chair with all needs within reach   Transfers   Sit to Stand 4  Minimal assistance   Additional items Assist x 1; Increased time required;Verbal cues   Stand to Sit 4  Minimal assistance   Additional items Assist x 1; Increased time required;Verbal cues   Additional Comments transfers with RW   Ambulation/Elevation   Gait pattern Excessively slow; Short stride; Foward flexed;Decreased foot clearance; Improper Weight shift  (mainted TTWB throughout gait trial)   Gait Assistance 4  Minimal assist   Additional items Assist x 1;Verbal cues; Tactile cues   Assistive Device Rolling walker   Distance 25ft x 2 trials   Stair Management Assistance Not tested   Balance   Static Sitting Fair +   Dynamic Sitting Fair +   Static Standing Fair -   Dynamic Standing Poor +   Ambulatory Poor +   Activity Tolerance   Activity Tolerance Patient tolerated treatment well   Medical Staff Made Aware OT; co-eval performed this date 2* increased medical complexity and multiple co-morbidities   Nurse Made Aware RN cleared pt to participate in therapy session   Assessment   Prognosis Good   Problem List Decreased strength;Decreased endurance; Impaired balance;Decreased mobility;Pain;Orthopedic restrictions   Assessment Pt seen for high complexity PT evaluation  Pt with active PT eval/treat orders  Pt is a 40 y o  female who was admitted to [facility] on 7/6/2022 s/p fall while skateboarding sustained L displaced femoral neck fx  Pt now s/p L ORIF- TTWB per ortho   Pt's current dx/ problem list include: male to female transgender person, DISHA  Comorbidities affecting pt's physical performance at time of assessment are as follows:  has no past medical history on file  Personal factors affecting pt at time of initial examination include: steps to enter environment, limited home support, past experience, inability to perform IADLs, inability to perform ADLs, inability to ambulate household distances, and recent fall(s)  Due to acute medical issues, ongoing medical workup for primary dx; pain, fall risk, increased reliance on more restrictive AD compared to baseline;  decreased activity tolerance compared to baseline, increased assistance needed from caregiver at current time, multiple lines, decline in overall functional mobility status; health management issues; note unstable clinical picture (high complexity)  At baseline, pt resides with roommate in 2nd floor apartment with ~25 steps to 2nd floor and was independent prior to hospital admission  Currently, upon initial examination, pt  is requiring S level A for bed mobility skills; min A for functional transfers and min A for ambulation with RW  Currently, pt presents functioning below baseline and with overall mobility deficits 2* to: decreased LE strength/AROM; limited flexibility;  generalized weakness/ deconditioning; decreased endurance; decreased activity tolerance; decreased coordination; impaired balance; gait deviations; fatigue; multiple lines; as well as : weakness, decreased ROM, impaired balance, gait deviations, pain, decreased activity tolerance, decreased functional mobility tolerance, fall risk and orthopedic restrictions  Pt currently at increased risk for falls  At the end of evaluation, pt was left sitting OOB in bedside chiar with all needs in reach   Pt would benefit from continued skilled PT services while in hospital to address remaining limitations and maximize functional potential  PT to continue to follow pt and recommends STR pending progress  The patient's AM-PAC Basic Mobility Inpatient Short Form Raw Score is 16  A Raw score of less than or equal to 16 suggests the patient may benefit from discharge to post-acute rehabilitation services  Please also refer to the recommendation of the Physical Therapist for safe discharge planning  Barriers to Discharge Inaccessible home environment;Decreased caregiver support   Goals   Patient Goals to go for a walk   STG Expiration Date 07/22/22   Short Term Goal #1 STG 1  Pt will be able to perform bed mobility tasks with mod I in order to improve overall functional mobility and assist in safe d/c  STG 2  Pt with sit EOB for at least 25 minutes at mod I level in order to strengthen abdominal musculature and assist in future transfers/ ambulation  STG 3  Pt will be able to perform functional transfer with mod I in order to improve overall functional mobility and assist in safe d/c  STG 4  Pt will be able to ambulate at least 250 feet with least restrictive device with mod I A in order to improve overall functional mobility and assist in safe d/c  STG 5  Pt will improve sitting/standing static/dynamic balance 1/2 grade in order to improve functional mobility and assist in safe d/c  STG 6  Pt will improve LE strength by 1/2 grade in order to improve functional mobility and assist in safe d/c  STG 7  Pt will be able to negotiate at least 20 stairs with least restrictive device with mod I A in order to improve overall functional mobility and assist in safe d/c    PT Treatment Day 0   Plan   Treatment/Interventions ADL retraining;Functional transfer training;LE strengthening/ROM; Patient/family training;Equipment eval/education; Bed mobility;Gait training;Spoke to nursing;Spoke to case management;OT   PT Frequency Other (Comment)  (3-6x/wk)   Recommendation   PT Discharge Recommendation Post acute rehabilitation services  (may benefit from PMR consult)   Equipment Recommended Desi Luu Walker Package Recommended Wheeled walker   AM-PAC Basic Mobility Inpatient   Turning in Bed Without Bedrails 3   Lying on Back to Sitting on Edge of Flat Bed 3   Moving Bed to Chair 3   Standing Up From Chair 3   Walk in Room 3   Climb 3-5 Stairs 1   Basic Mobility Inpatient Raw Score 16   Basic Mobility Standardized Score 38 32   Highest Level Of Mobility   JH-HLM Goal 5: Stand one or more mins   JH-HLM Achieved 7: Walk 25 feet or more         Pebbles nAayeli, PT

## 2022-07-08 NOTE — ARC ADMISSION
Referral received for consideration of patient for inpatient acute rehab  Will review patient's case with [facility] physician and update CM as able

## 2022-07-08 NOTE — CASE MANAGEMENT
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  Phone: [phone] Fax: [phone]    Primary Care Provider: No primary care provider on file  Primary Insurance: BLUE CROSS  Secondary Insurance:     DISCHARGE DETAILS:    Discharge planning discussed with[de-identified] Patient at bedside  Freedom of Choice: Yes  Comments - Freedom of Choice: List of acute rehab facilities given to pt at bedside  Other Referral/Resources/Interventions Provided:  Interventions: Acute Rehab  Referral Comments: CM advised by PT/OT team that pt would likely benefit from continued therapies upon medical stability  CM spoke with pt at bedside to discuss same and review choices  Pt is in agreement with placement and would prefer acute rehab facilities for further likelihood of patient/staff diversity within the hospital setting  Pt also in agreement with more "intense" therapy (i e , 3 hours of therapy per day) as she is motivated to participate in rehab   List of choices provided at bedside and pt is in agreement with a broad referral to guage availability in the area -- Of note, pt is particularly interested in [facility]  Referrals placed with provider's approval  Awaiting acceptances      Treatment Team Recommendation: Acute Rehab  Discharge Destination Plan[de-identified] Acute Rehab

## 2022-07-08 NOTE — PLAN OF CARE
Problem: PHYSICAL THERAPY ADULT  Goal: Performs mobility at highest level of function for planned discharge setting  See evaluation for individualized goals  Description: Treatment/Interventions: ADL retraining, Functional transfer training, LE strengthening/ROM, Patient/family training, Equipment eval/education, Bed mobility, Gait training, Spoke to nursing, Spoke to case management, OT  Equipment Recommended: Tong Noriega       See flowsheet documentation for full assessment, interventions and recommendations  Note: Prognosis: Good  Problem List: Decreased strength, Decreased endurance, Impaired balance, Decreased mobility, Pain, Orthopedic restrictions  Assessment: Pt seen for high complexity PT evaluation  Pt with active PT eval/treat orders  Pt is a 40 y o  female who was admitted to [facility] on 7/6/2022 s/p fall while skateboarding sustained L displaced femoral neck fx  Pt now s/p L ORIF- TTWB per ortho  Pt's current dx/ problem list include: male to female transgender person, DISHA  Comorbidities affecting pt's physical performance at time of assessment are as follows:  has no past medical history on file  Personal factors affecting pt at time of initial examination include: steps to enter environment, limited home support, past experience, inability to perform IADLs, inability to perform ADLs, inability to ambulate household distances, and recent fall(s)  Due to acute medical issues, ongoing medical workup for primary dx; pain, fall risk, increased reliance on more restrictive AD compared to baseline;  decreased activity tolerance compared to baseline, increased assistance needed from caregiver at current time, multiple lines, decline in overall functional mobility status; health management issues; note unstable clinical picture (high complexity)  At baseline, pt resides with roommate in 2nd floor apartment with ~25 steps to 2nd floor and was independent prior to hospital admission   Currently, upon initial examination, pt  is requiring S level A for bed mobility skills; min A for functional transfers and min A for ambulation with RW  Currently, pt presents functioning below baseline and with overall mobility deficits 2* to: decreased LE strength/AROM; limited flexibility;  generalized weakness/ deconditioning; decreased endurance; decreased activity tolerance; decreased coordination; impaired balance; gait deviations; fatigue; multiple lines; as well as : weakness, decreased ROM, impaired balance, gait deviations, pain, decreased activity tolerance, decreased functional mobility tolerance, fall risk and orthopedic restrictions  Pt currently at increased risk for falls  At the end of evaluation, pt was left sitting OOB in bedside chiar with all needs in reach  Pt would benefit from continued skilled PT services while in hospital to address remaining limitations and maximize functional potential  PT to continue to follow pt and recommends STR pending progress  The patient's AM-PAC Basic Mobility Inpatient Short Form Raw Score is 16  A Raw score of less than or equal to 16 suggests the patient may benefit from discharge to post-acute rehabilitation services  Please also refer to the recommendation of the Physical Therapist for safe discharge planning  Barriers to Discharge: Inaccessible home environment, Decreased caregiver support        PT Discharge Recommendation: Post acute rehabilitation services (may benefit from PMR consult)          See flowsheet documentation for full assessment

## 2022-07-08 NOTE — ANESTHESIA POSTPROCEDURE EVALUATION
Post-Op Assessment Note    CV Status:  Stable  Pain Score: 0    Pain management: adequate     Mental Status:  Alert and awake   Hydration Status:  Euvolemic and stable   PONV Controlled:  None   Airway Patency:  Patent      Post Op Vitals Reviewed: Yes      Staff: CRNA         No complications documented      /89 (07/08/22 0229)    Temp      Pulse 100 (07/08/22 0229)   Resp 18 (07/08/22 0229)    SpO2 99 % (07/08/22 0229)

## 2022-07-08 NOTE — UTILIZATION REVIEW
Initial Clinical Review    Admission: Date/Time/Statement:   Admission Orders (From admission, onward)     Ordered        07/07/22 0123  Inpatient Admission  Once                      Orders Placed This Encounter   Procedures    Inpatient Admission     Standing Status:   Standing     Number of Occurrences:   1     Order Specific Question:   Level of Care     Answer:   Med Surg [16]     Order Specific Question:   Estimated length of stay     Answer:   Inpatient Only Surgery     ED Arrival Information     Expected   7/6/2022     Arrival   7/6/2022 21:42    Acuity   Urgent            Means of arrival   Ambulance    Escorted by   [facility]    Service   Orthopedic Surgery    Admission type   Urgent            Arrival complaint   hip injury           Chief Complaint   Patient presents with    Hip Injury - Major     Pt fell onto L side while skateboarding, pt now c/o hip pain, - headstrike        Initial Presentation: 40 y o  who presented by EMS to [address] [place] [address] ED  Inpatient admission for evaluation and treatment of displaced L femoral neck fracture  Presented w/ L hip pain and inability to bear weight after falling off skateboard onto L side  No headstrike or LOC  On exam, anterior hip tender, pain w/ rotation internal/external, sensation intact  Plan: NWB LLE, analgesics, CT L hip for better image of fx, analgesics, NPO, natarajan, OR for total hip vs operative fixation  Hold hormonal treatment in preparation for surgical intervention  Date: 07/08/22   Day 2: POD#0 from L femoral neck ORIF  On exam, LLE dressing C/D/I, neurovascularly intact  Plan: toe touch WB LLE, PT/OT evals, analgesics, Lovenox, Trend labs, replete electrolytes as needed   DW, I&O      07/08/22 Surgery  Procedure: OPEN REDUCTION W/ INTERNAL FIXATION (ORIF) LEFT FEMORAL NECK FRACTURE (Left)  Indication: Closed fracture of neck of left femur, initial encounter ([facility] ) [S72 002A]  Anesthesia: General      ED Triage Vitals   Temperature Pulse Respirations Blood Pressure SpO2   07/06/22 2145 07/06/22 2145 07/06/22 2145 07/06/22 2145 07/06/22 2145   98 2 °F (36 8 °C) 81 20 130/78 100 %      Temp Source Heart Rate Source Patient Position - Orthostatic VS BP Location FiO2 (%)   07/06/22 2145 07/06/22 2145 07/07/22 0015 07/07/22 0015 --   Oral Monitor Lying Right arm       Pain Score       07/06/22 2145       10 - Worst Possible Pain          Wt Readings from Last 1 Encounters:   07/06/22 76 2 kg (168 lb)     Additional Vital Signs:   Date/Time Temp Pulse Resp BP MAP (mmHg) SpO2 Calculated FIO2 (%) - Nasal Cannula Nasal Cannula O2 Flow Rate  O2 Device   07/08/22 04:18:15 -- 81 -- 118/72 87 97 % -- -- --   07/08/22 03:48:15 98 °F (36 7 °C) 80 -- 127/78 94 95 % -- -- None (Room air)   07/08/22 0330 98 °F (36 7 °C) 84 30 Abnormal  140/86 103 97 % 28 2 L/min Nasal cannula   07/08/22 0315 -- 82 25 Abnormal  122/74 90 99 % -- -- --   07/08/22 0300 -- 80 30 Abnormal  132/78 93 100 % -- -- --   07/08/22 0245 -- 84 24 Abnormal  138/69 89 100 % -- -- --   07/08/22 0230 -- 85 16 139/98 119 100 % -- -- --   07/08/22 0229 -- 100 18 151/89 -- 99 % 36 4 L/min Nasal cannula   07/08/22 0227 97 2 °F (36 2 °C) 91 16 151/89 -- 99 % 36 4 L/min --   07/07/22 14:13:28 98 2 °F (36 8 °C) 81 16 113/65 81 94 % -- -- --   07/07/22 07:59:45 97 5 °F (36 4 °C) 85 17 127/72 90 94 % -- -- --   07/07/22 0700 -- 68 15 102/57 -- 93 % -- -- --   07/07/22 0600 -- 74 13 101/60 72 94 % -- -- None (Room air)   07/07/22 0445 -- 76 18 104/64 75 94 % -- -- None (Room air)   07/07/22 0015 -- 76 18 136/80 95 97 % -- -- None (Room air)       Pertinent Labs/Diagnostic Test Results:   CT lower extremity wo contrast left   Final Result by Jose Castillo DO (07/07 1026)   FINDINGS/IMPRESSION:      OSSEOUS STRUCTURES:  Comminuted fracture of the left femoral neck with posterior angulation  No hip dislocation  Bones otherwise appear intact        VISUALIZED MUSCULATURE:  Unremarkable  SOFT TISSUES:  Soft tissue swelling about the left hip      OTHER PERTINENT FINDINGS:  None  Workstation performed: [id number]         XR hip/pelv 2-3 vws left if performed   Final Result by Wilfred Kerr DO (07/07 1305)      Mildly displaced left proximal femoral fracture junction of the basicervical neck and greater trochanteric region  Workstation performed: [id number]         XR femur 2 views LEFT   Final Result by Wilfred Kerr DO (07/07 1301)      Negative for distal left femoral fracture  Please see separate report of left hip also on this date  Workstation performed: [id number]         XR chest 1 view   Final Result by Wilfred Kerr DO (07/07 1307)      No acute cardiopulmonary disease                    Workstation performed: [id number]               Results from last 7 days   Lab Units 07/07/22  0008   WBC Thousand/uL 18 87*   HEMOGLOBIN g/dL 12 4   HEMATOCRIT % 38 2   PLATELETS Thousands/uL 284   NEUTROS ABS Thousands/µL 15 05*         Results from last 7 days   Lab Units 07/07/22  0008   SODIUM mmol/L 136   POTASSIUM mmol/L 3 8   CHLORIDE mmol/L 106   CO2 mmol/L 25   ANION GAP mmol/L 5   BUN mg/dL 17   CREATININE mg/dL 0 93   CALCIUM mg/dL 8 8             Results from last 7 days   Lab Units 07/07/22  0008   GLUCOSE RANDOM mg/dL 106     Results from last 7 days   Lab Units 07/07/22  0444   PROTIME seconds 12 5   INR  0 97   PTT seconds 28         ED Treatment:   Medication Administration from 07/06/2022 2141 to 07/07/2022 6518       Date/Time Order Dose Route Action     07/06/2022 2232 fentanyl citrate (PF) (FOR EMS ONLY) 100 mcg/2 mL injection 100 mcg 0 mcg Does not apply Given to EMS     07/06/2022 2237 fentanyl citrate (PF) 100 MCG/2ML 50 mcg 50 mcg Intravenous Given     07/06/2022 2238 tetanus-diphtheria-acellular pertussis (BOOSTRIX) IM injection 0 5 mL 0 5 mL Intramuscular Given     07/07/2022 0446 multi-electrolyte (PLASMALYTE-A/ ISOLYTE-S PH 7 4) IV solution 125 mL/hr Intravenous New Bag     07/07/2022 0546 acetaminophen (TYLENOL) tablet 975 mg 975 mg Oral Given     07/07/2022 0224 oxyCODONE (ROXICODONE) immediate release tablet 10 mg 10 mg Oral Given     07/07/2022 0547 HYDROmorphone (DILAUDID) injection 0 5 mg 0 5 mg Intravenous Given     07/07/2022 0243 HYDROmorphone (DILAUDID) injection 0 5 mg 0 5 mg Intravenous Given          Admitting Diagnosis: Closed fracture of neck of left femur, initial encounter ([facility] [address] ) [S72 002A]  Major injury of hip [S79 020K]  Age/Sex: 40 y o  adult  Admission Orders:  Regular Diet  Fall precautions  Incentive Spirometry  Toe Touch WB LLE  DW  I&O  SCDs  Scheduled Medications:  acetaminophen, 975 mg, Oral, Q8H [address]  cefazolin, 2,000 mg, Intravenous, Q8H  docusate sodium, 100 mg, Oral, BID  [START ON 7/9/2022] enoxaparin, 30 mg, Subcutaneous, Q12H [address]  multivitamin-minerals, 1 tablet, Oral, Daily  nicotine, 1 patch, Transdermal, Daily  polyethylene glycol, 17 g, Oral, Daily  senna, 1 tablet, Oral, Daily  traZODone, 50 mg, Oral, HS    Continuous IV Infusions:  multi-electrolyte, 125 mL/hr, Intravenous, Continuous    PRN Meds:  aluminum-magnesium hydroxide-simethicone, 30 mL, Oral, Q6H PRN  calcium carbonate, 1,000 mg, Oral, Daily PRN  HYDROmorphone, 0 5 mg, Intravenous, Q3H PRN; 7/7 x2  ondansetron, 4 mg, Intravenous, Q6H PRN  oxyCODONE, 10 mg, Oral, Q4H PRN; 7/7 x3, 7/8 x2  oxyCODONE, 5 mg, Oral, Q4H PRN  simethicone, 80 mg, Oral, 4x Daily PRN        IP CONSULT TO INTERNAL MEDICINE  IP CONSULT TO CASE MANAGEMENT    Network Utilization Review Department  ATTENTION: Please call with any questions or concerns to [phone] and carefully listen to the prompts so that you are directed to the right person   All voicemails are confidential   Elise Doing all requests for admission clinical reviews, approved or denied determinations and any other requests to dedicated fax number below belonging to the campus where the patient is receiving treatment   List of dedicated fax numbers for the Facilities:  [address] DENIALS (Administrative/Medical Necessity) [phone]   [address]  (Maternity/NICU/Pediatrics) [phone]   [address]  [address] [facility] [place] [address] [id number] [address] [facility] [phone]

## 2022-07-08 NOTE — PLAN OF CARE
Problem: OCCUPATIONAL THERAPY ADULT  Goal: Performs self-care activities at highest level of function for planned discharge setting  See evaluation for individualized goals  Description: Treatment Interventions: ADL retraining, Functional transfer training, Endurance training, Patient/family training, Equipment evaluation/education, Compensatory technique education, Activityengagement          See flowsheet documentation for full assessment, interventions and recommendations  Note: Limitation: Decreased ADL status, Decreased endurance, Decreased self-care trans, Decreased high-level ADLs  Prognosis: Good  Assessment: Pt is a 40 y o  adult who was [facility] to [facility] on 7/6/2022 with L femur fx s/p fall from skateboard   Pt's problem list also includes PMH of DISHA, +smoker, male to femal transgender person, +mariguana use  At baseline pt was completing adls and mobility independently - I iadls - shares homemaking with roommate  Pt lives with roommate in 2nd floor apt with 5+20 RICHI - reports rommate is rarely home and other friends/transgender community can only provide limited support  Currently pt requires min assist for overall ADLS and min assist for functional mobility/transfers  Pt currently presents with impairments in the following categories -steps to enter environment, limited home support, difficulty performing ADLS, difficulty performing IADLS  and environment activity tolerance, endurance, standing balance/tolerance and sitting balance/tolerance   These impairments, as well as pt's fatigue, pain, orthopedic restricitions , WBS , decreased caregiver support, risk for falls and home environment  limit pt's ability to safely engage in all baseline areas of occupation, includingbathing, dressing, toileting, functional mobility/transfers, community mobility, laundry , driving, house maintenance, meal prep, cleaning, work/volunteer work , social participation  and leisure activities  From [place] standpoint, recommend inpt rehab upon D/C  OT will continue to follow to address the below stated goals  OT Discharge Recommendation: Post acute rehabilitation services  OT - OK to Discharge:  Yes

## 2022-07-08 NOTE — ANESTHESIA PREPROCEDURE EVALUATION
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   (+) Marijuana use      Adequately NPO  No prior anesthesia complications  Smokes about 1ppd  Smokes marijuana about once a week  Physical Exam    Airway    Mallampati score: III  TM Distance: >3 FB  Neck ROM: full     Dental       Cardiovascular  Rhythm: regular, Rate: normal,     Pulmonary  Pulmonary exam normal     Other Findings        Anesthesia Plan  ASA Score- 2     Anesthesia Type- general with ASA Monitors  Additional Monitors:   Airway Plan: ETT  Plan Factors-Exercise tolerance (METS): >4 METS  Chart reviewed  Existing labs reviewed  Patient summary reviewed  Patient is a current smoker  Obstructive sleep apnea risk education given perioperatively  Induction- intravenous  Postoperative Plan-     Informed Consent- Anesthetic plan and risks discussed with patient  I personally reviewed this patient with the CRNA  Discussed and agreed on the Anesthesia Plan with the CRNA  Park Mcarthur

## 2022-07-08 NOTE — OP NOTE
OPERATIVE REPORT  PATIENT NAME: Jerson Banuelos    :  1975  MRN: [id number]  Pt Location: BE OR ROOM 18    SURGERY DATE: 2022    Surgeon(s) and Role:     * Eben Rincon MD - Primary     * Micaela Doyle MD - Assisting    Preop Diagnosis:  Displaced L femoral neck fracture    Post-Op Diagnosis Codes:  Displaced L femoral neck fracture    Procedure(s) (LRB):  OPEN REDUCTION W/ INTERNAL FIXATION (ORIF) LEFT FEMORAL NECK FRACTURE (Left)    Procedure:  ORIF L femoral neck fracture with DHS/derotational screw (CPT 41218)    Specimen(s):  * No specimens in log *    Estimated Blood Loss:   100 cc    Drains:  * No LDAs found *    Anesthesia Type:   General    Operative Indications:  Displaced L femoral neck fracture in ambulatory patient    Operative Findings:  See below    Complications:   None    Implants: Synthes 130 deg 3 hl side plate, 95 mm lag screw, compression screw, 6 5 mm cannulated screw x1    Procedure and Technique:  The patient is a 62-year-old male who sustained a fall from skateboard and sustained a left displaced femoral neck fracture  No reports of prior degenerative changes or hip pain  The injury resulted in a displaced left femoral neck fracture indicated for surgery  I discussed treatment options with the patient  At current age and an absence of degenerative changes of hip, shared decision was made for open reduction internal fixation of fracture  I did advise the patient that in the presence of an element of comminution of the fracture as well as the severe verticality of the fracture, as well as the inherent high failure rate for fixation of these fractures, the patient is at high risk of varus malunion, nonunion, AVN, need for additional procedures, and patient decided to proceed with surgery  The patient's operative site, laterality, procedure, consent were verified in the preoperative area and the patient was transitioned to the operating room    General anesthesia was provided by the anesthesia team and the left lower extremity was prepped and draped in the usual sterile fashion  The left lower extremity was draped free to allow for manipulation of the leg  After time-out for safety was performed, laterally based incision at the proximal thigh was made and dissection was taken down to the level of the IT band which was incised  The vastus lateralis was then elevated off the intermuscular septum taking care to control bleeding vessels and the lateral proximal femoral bone was encountered  A portion of the vastus lateralis origin was incised for intended hardware placement  Attention was then turned to the reduction of the femoral neck fracture  The Leadbetter maneuver took place and was found to result in satisfactory alignment of the femoral neck fracture  As such, I did not feel that anterior approach to the hip joint for further manipulation of the fracture was warranted  Multiple Steinmann pins were then passed from lateral femur to femoral head which were found to maintain the alignment of fracture  The laterally based DHS guide was utilized and wire for lag screw was placed in a center center position in the femoral head  After we confirm satisfactory position of the intended pathway, wire for 6 5 mm cannulated screw was placed parallel to this wire and the screw was then placed which was noted to not affect the reduction of the fracture which was well maintained  Triple reaming and tapping then took place for the lag screw in standard fashion  Side plate was applied and the lag screw was advanced without incident  Cortical screws were placed through the plate to secure the fixation  Compression screw was then utilized and secured through the barrel  Left fluoroscopic imaging revealed satisfactory alignment of the fracture after removal of all reduction wires  The wound was then copiously irrigated with sterile saline    Deep layer closure took place with heavy Vicryl suture, subcutaneous layer closure took place with 2-0 Vicryl suture, skin layer closure took place with staples  Sterile dressing consisted of Mepilex dressing  All final counts, including but not limited to instrument, sponge, needle counts were correct  I was present for the entire procedure  The patient was extubated and awakened and transitioned to the PACU in stable condition  There were no immediate complications  The patient will be toe-touch weight-bearing on the operative extremity for an anticipated 8-10 weeks  The patient will require deep vein thrombosis prophylaxis for 4 weeks    We will see the patient in 2 weeks for consideration for staple removal     Patient Disposition:  PACU       SIGNATURE: Dinesh Harrington MD  DATE: July 8, 2022  TIME: 2:14 AM

## 2022-07-08 NOTE — OCCUPATIONAL THERAPY NOTE
Occupational Therapy Evaluation     Patient Name: Geoff Sinha  Today's Date: 7/8/2022  Problem List  Principal Problem:    Pre-operative examination  Active Problems:    Closed displaced fracture of left femoral neck (HCC)    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    Smoking    Marijuana use    DISHA (obstructive sleep apnea)    Past Medical History  History reviewed  No pertinent past medical history  Past Surgical History  History reviewed  No pertinent surgical history  07/08/22 1145   OT Last Visit   OT Visit Date 07/08/22   Note Type   Note type Evaluation   Restrictions/Precautions   Weight Bearing Precautions Per Order Yes   RUE Weight Bearing Per Order WBAT   LUE Weight Bearing Per Order WBAT   RLE Weight Bearing Per Order WBAT   LLE Weight Bearing Per Order (S)  TTWB   Pain Assessment   Pain Assessment Tool 0-10   Pain Score 2   Pain Location/Orientation Orientation: Left; Location: Leg   Hospital Pain Intervention(s) Repositioned; Ambulation/increased activity; Emotional support;Relaxation technique   Home Living   Type of [address] level;Stairs to enter with rails  (5 RIC[address] building and additional 20 to access apt)   Bathroom Shower/Tub Tub/shower unit   Bathroom Toilet Standard   Prior Function   Level of [place] Independent with ADLs and functional mobility   Lives With [place])  (pt reports roommate is never home)   Receives Help From Friend(s)   ADL Assistance Independent   IADLs Independent   Falls in the last 6 months 1 to 4   Vocational Full time employment   Lifestyle   Autonomy I adls and mobility- i iadls   Reciprocal Relationships supportive friends however pt reports that they can only provide limited support   Service to Others works FT for [employer] - reports she can work remotely   [employer] 24 offers no c/o   ADL   Eating Assistance 7  Independent   Grooming Assistance 5  [address] [address] Supervision/Setup   LB Bathing Assistance 4  Minimal Assistance   UB Dressing Assistance 5  Supervision/Setup   LB Dressing Assistance 4  Minimal Assistance   Toileting Assistance  5  Supervision/Setup   Bed Mobility   Supine to Sit 5  Supervision   Additional Comments unable to lift LLE to bring OOB but able to complete by hooking LLE with RLE after instruction   Transfers   Sit to Stand 4  Minimal assistance   Stand to Sit 4  Minimal assistance   Stand pivot 4  Minimal assistance   Functional Mobility   Functional Mobility 4  Minimal assistance   Additional Comments short distances (<HH distances)   Additional items Rolling walker   Balance   Static Sitting Fair +   Dynamic Sitting Fair   Static Standing Fair -   Dynamic Standing Poor +   Ambulatory Poor +   Activity Tolerance   Activity Tolerance Patient limited by fatigue;Patient limited by pain   RUE Assessment   RUE Assessment WFL   LUE Assessment   LUE Assessment WFL   Cognition   Overall Cognitive Status WFL   Assessment   Limitation Decreased ADL status; Decreased endurance;Decreased self-care trans;Decreased high-level ADLs   Prognosis Good   Assessment Pt is a 40 y o  adult who was admitted to [facility] on 7/6/2022 with L femur fx s/p fall from skateboard   Pt's problem list also includes PMH of DISHA, +smoker, male to femal transgender person, +mariguana use  At baseline pt was completing adls and mobility independently - I iadls - shares homemaking with roommate  Pt lives with roommate in 2nd floor apt with 5+20 RICHI - reports rommate is rarely home and other friends/transgender community can only provide limited support  Currently pt requires min assist for overall ADLS and min assist for functional mobility/transfers   Pt currently presents with impairments in the following categories -steps to enter environment, limited home support, difficulty performing ADLS, difficulty performing IADLS  and environment activity tolerance, endurance, standing balance/tolerance and sitting balance/tolerance  These impairments, as well as pt's fatigue, pain, orthopedic restricitions , WBS , decreased caregiver support, risk for falls and home environment  limit pt's ability to safely engage in all baseline areas of occupation, includingbathing, dressing, toileting, functional mobility/transfers, community mobility, laundry , driving, house maintenance, meal prep, cleaning, work/volunteer work , social participation  and leisure activities  From [place] standpoint, recommend inpt rehab upon D/C  OT will continue to follow to address the below stated goals  Goals   Patient Goals go for a walk   LTG Time Frame 10-14   Long Term Goal #1 refer to established goals below   Plan   Treatment Interventions ADL retraining;Functional transfer training; Endurance training;Patient/family training;Equipment evaluation/education; Compensatory technique education; Activityengagement   Goal Expiration Date 07/22/22   OT Frequency 3-5x/wk   Recommendation   OT Discharge Recommendation Post acute rehabilitation services   OT - OK to Discharge Yes   Additional Comments  to rehab when medically cleared   AM-PAC Daily Activity Inpatient   Lower Body Dressing 3   Bathing 3   Toileting 3   Upper Body Dressing 4   Grooming 4   Eating 4   Daily Activity Raw Score 21   Daily Activity Standardized Score (Calc for Raw Score >=11) 44 27   AM-PAC Applied Cognition Inpatient   Following a Speech/Presentation 4   Understanding Ordinary Conversation 4   Taking Medications 4   Remembering Where Things Are Placed or Put Away 4   Remembering List of 4-5 Errands 4   Taking Care of Complicated Tasks 4   Applied Cognition Raw Score 24   Applied Cognition Standardized Score 62 21       OCCUPATIONAL THERAPY GOALS:    *Mod I adls after setup with use of AE PRN  *Mod I toileting and clothing management   *Mod I functional mobility and transfers to/from all surfaces with good dynamic balance and safety for participation in dynamic adls and iadl tasks   *Demonstrate good carryover with safe use of RW during functional tasks   *Assess DME needs   *Increase activity tolerance to 35-40 minutes for participation in adls and enjoyable activities  *Assist with safe d/c recommendations       Reji Montero OT

## 2022-07-08 NOTE — PROGRESS NOTES
Progress Note - Orthopedics   Kath Perez 40 y o  adult MRN: [id number]      Subjective:  No acute events overnight  No acute distress  Denies fevers, chills, SOB  Objective:  A 10 point ROS was performed; negative except as noted above  Labs:  Lab Results   Component Value Date/Time    WBC 18 87 (H) 07/07/2022 12:08 AM    HGB 12 4 07/07/2022 12:08 AM       Physical:  Vitals:    07/08/22 0418   BP: 118/72   Pulse: 81   Resp:    Temp:    SpO2: 97%     Musculoskeletal: left Lower Extremity  · Dressing C/D/I  · SILT michi/saph/sp/dp/tib nerve distributions   · +FHL/EHL, +ankle DF/PF      · Toes WWP w bCR    Assessment:    40 y o  adult post op day #0 from Left femoral neck ORIF    Plan:  · TTWB LLE  · PT/OT  · Pain control  · DVT ppx: Sequential compression device (Venodyne)  and Enoxaparin (Lovenox)  · Will continue to assess for acute blood loss anemia  · Dispo: Ortho will follow   · Morning labs      Chu Boyer MD

## 2022-07-09 LAB
ANION GAP SERPL CALCULATED.3IONS-SCNC: 3 MMOL/L (ref 4–13)
BASOPHILS # BLD AUTO: 0.03 THOUSANDS/ΜL (ref 0–0.1)
BASOPHILS NFR BLD AUTO: 0 % (ref 0–1)
BUN SERPL-MCNC: 6 MG/DL (ref 5–25)
CALCIUM SERPL-MCNC: 8 MG/DL (ref 8.3–10.1)
CHLORIDE SERPL-SCNC: 105 MMOL/L (ref 100–108)
CO2 SERPL-SCNC: 29 MMOL/L (ref 21–32)
CREAT SERPL-MCNC: 0.75 MG/DL (ref 0.6–1.3)
EOSINOPHIL # BLD AUTO: 0.1 THOUSAND/ΜL (ref 0–0.61)
EOSINOPHIL NFR BLD AUTO: 1 % (ref 0–6)
ERYTHROCYTE [DISTWIDTH] IN BLOOD BY AUTOMATED COUNT: 13 % (ref 11.6–15.1)
GLUCOSE SERPL-MCNC: 102 MG/DL (ref 65–140)
HCT VFR BLD AUTO: 31.6 % (ref 36.5–46.1)
HGB BLD-MCNC: 10.3 G/DL (ref 12–15.4)
IMM GRANULOCYTES # BLD AUTO: 0.03 THOUSAND/UL (ref 0–0.2)
IMM GRANULOCYTES NFR BLD AUTO: 0 % (ref 0–2)
LYMPHOCYTES # BLD AUTO: 2.05 THOUSANDS/ΜL (ref 0.6–4.47)
LYMPHOCYTES NFR BLD AUTO: 20 % (ref 14–44)
MCH RBC QN AUTO: 30.8 PG (ref 26.8–34.3)
MCHC RBC AUTO-ENTMCNC: 32.6 G/DL (ref 31.4–37.4)
MCV RBC AUTO: 95 FL (ref 82–98)
MONOCYTES # BLD AUTO: 0.88 THOUSAND/ΜL (ref 0.17–1.22)
MONOCYTES NFR BLD AUTO: 9 % (ref 4–12)
NEUTROPHILS # BLD AUTO: 7.3 THOUSANDS/ΜL (ref 1.85–7.62)
NEUTS SEG NFR BLD AUTO: 70 % (ref 43–75)
NRBC BLD AUTO-RTO: 0 /100 WBCS
PLATELET # BLD AUTO: 243 THOUSANDS/UL (ref 149–390)
PMV BLD AUTO: 9.6 FL (ref 8.9–12.7)
POTASSIUM SERPL-SCNC: 3.7 MMOL/L (ref 3.5–5.3)
RBC # BLD AUTO: 3.34 MILLION/UL (ref 3.88–5.12)
SODIUM SERPL-SCNC: 137 MMOL/L (ref 136–145)
WBC # BLD AUTO: 10.39 THOUSAND/UL (ref 4.31–10.16)

## 2022-07-09 PROCEDURE — 85025 COMPLETE CBC W/AUTO DIFF WBC: CPT | Performed by: PHYSICIAN ASSISTANT

## 2022-07-09 PROCEDURE — 99024 POSTOP FOLLOW-UP VISIT: CPT | Performed by: PHYSICIAN ASSISTANT

## 2022-07-09 PROCEDURE — 80048 BASIC METABOLIC PNL TOTAL CA: CPT | Performed by: PHYSICIAN ASSISTANT

## 2022-07-09 RX ADMIN — POLYETHYLENE GLYCOL 3350 17 G: 17 POWDER, FOR SOLUTION ORAL at 10:09

## 2022-07-09 RX ADMIN — HYDROMORPHONE HYDROCHLORIDE 0.5 MG: 1 INJECTION, SOLUTION INTRAMUSCULAR; INTRAVENOUS; SUBCUTANEOUS at 16:30

## 2022-07-09 RX ADMIN — HYDROMORPHONE HYDROCHLORIDE 0.5 MG: 1 INJECTION, SOLUTION INTRAMUSCULAR; INTRAVENOUS; SUBCUTANEOUS at 06:04

## 2022-07-09 RX ADMIN — OXYCODONE HYDROCHLORIDE 10 MG: 10 TABLET ORAL at 10:09

## 2022-07-09 RX ADMIN — TRAZODONE HYDROCHLORIDE 50 MG: 50 TABLET ORAL at 21:20

## 2022-07-09 RX ADMIN — DOCUSATE SODIUM 100 MG: 100 CAPSULE, LIQUID FILLED ORAL at 18:49

## 2022-07-09 RX ADMIN — OXYCODONE HYDROCHLORIDE 10 MG: 10 TABLET ORAL at 03:52

## 2022-07-09 RX ADMIN — ACETAMINOPHEN 975 MG: 325 TABLET, FILM COATED ORAL at 06:05

## 2022-07-09 RX ADMIN — ENOXAPARIN SODIUM 30 MG: 30 INJECTION SUBCUTANEOUS at 15:11

## 2022-07-09 RX ADMIN — NICOTINE 1 PATCH: 14 PATCH, EXTENDED RELEASE TRANSDERMAL at 10:09

## 2022-07-09 RX ADMIN — OXYCODONE HYDROCHLORIDE 10 MG: 10 TABLET ORAL at 21:20

## 2022-07-09 RX ADMIN — SENNOSIDES 8.6 MG: 8.6 TABLET, FILM COATED ORAL at 10:09

## 2022-07-09 RX ADMIN — ACETAMINOPHEN 975 MG: 325 TABLET, FILM COATED ORAL at 15:10

## 2022-07-09 RX ADMIN — OXYCODONE HYDROCHLORIDE 10 MG: 10 TABLET ORAL at 15:11

## 2022-07-09 RX ADMIN — ACETAMINOPHEN 975 MG: 325 TABLET, FILM COATED ORAL at 21:20

## 2022-07-09 RX ADMIN — MULTIPLE VITAMINS W/ MINERALS TAB 1 TABLET: TAB ORAL at 10:09

## 2022-07-09 RX ADMIN — ENOXAPARIN SODIUM 30 MG: 30 INJECTION SUBCUTANEOUS at 20:44

## 2022-07-09 RX ADMIN — DOCUSATE SODIUM 100 MG: 100 CAPSULE, LIQUID FILLED ORAL at 10:09

## 2022-07-09 NOTE — PROGRESS NOTES
Orthopedics Progress / Post-op Note  Kath Perez 40 y o  adult MRN: [id number]  Unit/Bed#: -01      Subjective:  40 y  o adult post operative day 1 s/p  left hip ORIF  Pain in left lateral hip continues when pain medications wear off  Denies any acute increased pain, no numbness/tingling in the LLE  No acute issues overnight        Objective:    Labs:  0   Lab Value Date/Time    HCT 31 6 (L) 07/09/2022 0511    HCT 26 5 (L) 07/08/2022 1233    HCT 38 2 07/07/2022 0008    HGB 10 3 (L) 07/09/2022 0511    HGB 8 9 (L) 07/08/2022 1233    HGB 12 4 07/07/2022 0008    INR 0 97 07/07/2022 0444    WBC 10 39 (H) 07/09/2022 0511    WBC 9 14 07/08/2022 1233    WBC 18 87 (H) 07/07/2022 0008       Meds:    Current Facility-Administered Medications:     acetaminophen (TYLENOL) tablet 975 mg, 975 mg, Oral, Q8H [facility], Jassi Conway MD, 975 mg at 07/09/22 0605    aluminum-magnesium hydroxide-simethicone (MYLANTA) oral suspension 30 mL, 30 mL, Oral, Q6H PRN, Jassi Conway MD    calcium carbonate (TUMS) chewable tablet 1,000 mg, 1,000 mg, Oral, Daily PRN, Jassi Conway MD    docusate sodium (COLACE) capsule 100 mg, 100 mg, Oral, BID, Jassi Conway MD, 100 mg at 07/08/22 0957    enoxaparin (LOVENOX) subcutaneous injection 30 mg, 30 mg, Subcutaneous, Q12H [facility], Jassi Conway MD    HYDROmorphone (DILAUDID) injection 0 5 mg, 0 5 mg, Intravenous, Q3H PRN, Jassi Conway MD, 0 5 mg at 07/09/22 0604    multivitamin-minerals (CENTRUM) tablet 1 tablet, 1 tablet, Oral, Daily, Jassi Conway MD, 1 tablet at 07/08/22 0957    nicotine (NICODERM CQ) 14 mg/24hr TD 24 hr patch 1 patch, 1 patch, Transdermal, Daily, Jassi Conway MD, 1 patch at 07/08/22 0957    ondansetron (ZOFRAN) injection 4 mg, 4 mg, Intravenous, Q6H PRN, Jassi Conway MD    oxyCODONE (ROXICODONE) immediate release tablet 10 mg, 10 mg, Oral, Q4H PRN, Jassi Conway MD, 10 mg at 07/09/22 0352    oxyCODONE (ROXICODONE) IR tablet 5 mg, 5 mg, Oral, Q4H PRN, Chanel Adrian MD    polyethylene glycol (MIRALAX) packet 17 g, 17 g, Oral, Daily, Chanel Adrian MD, 17 g at 07/08/22 0957    senna (SENOKOT) tablet 8 6 mg, 1 tablet, Oral, Daily, Chanel Adrian MD, 8 6 mg at 07/08/22 0957    simethicone (MYLICON) chewable tablet 80 mg, 80 mg, Oral, 4x Daily PRN, Chanel Adrian MD    traZODone (DESYREL) tablet 50 mg, 50 mg, Oral, HS, Chanel Adrian MD, 50 mg at 07/08/22 2101    Blood Culture:   No results found for: BLOODCX    Wound Culture:   No results found for: WOUNDCULT    Ins and Outs:  I/O last 24 hours: In: 900 [I V :900]  Out: 3230 [ZECTN:1031]      Orthopedic Physical Exam:  Vitals:    07/08/22 2107   BP: 139/74   Pulse: 85   Resp:    Temp: 99 3 °F (37 4 °C)   SpO2: 97%   Lying in bed comfortably, NAD, breathing unlabored  left Lower Extremity extremity:  · Mepilex Dressing is C/D/I, no saturation or leaking  · Thigh is soft  · No surrounding ecchymosis  · 5/5 strength ankle DF/PF, EHL/FHL  · Sensation intact  · Palpable dorsalis pedis pulse  · Toes warm, sensate, mobile with good cap refill  · No calf tenderness or pitting edema      _*_*_*_*_*_*_*_*_*_*_*_*_*_*_*_*_*_*_*_*_*_*_*_*_*_*_*_*_*_*_*_*_*_*_*_*_*_*_*_*_*    Assessment: 40 y  o adult post operative day 1 s/p  left hip ORIF  Plan:  · TTWB LLE  · Up and out of bed with assistance  · DVT prophylaxis (Lovenox 30mg BID)  · Analgesics  · PT/OT  · Dispo: Ortho will follow  PT recommending short term rehab    · Patient noted to have acute blood loss anemia due to a drop in Hbg of > 2 0g from preop levels, will monitor vital signs and resuscitate with IV fluids as needed        Niki Cadena PA-C

## 2022-07-09 NOTE — PLAN OF CARE
Problem: MOBILITY - ADULT  Goal: Maintain or return to baseline ADL function  Description: INTERVENTIONS:  -  Assess patient's ability to carry out ADLs; assess patient's baseline for ADL function and identify physical deficits which impact ability to perform ADLs (bathing, care of mouth/teeth, toileting, grooming, dressing, etc )  - Assess/evaluate cause of self-care deficits   - Assess range of motion  - Assess patient's mobility; develop plan if impaired  - Assess patient's need for assistive devices and provide as appropriate  - Encourage maximum independence but intervene and supervise when necessary  - Involve family in performance of ADLs  - Assess for home care needs following discharge   - Consider OT consult to assist with ADL evaluation and planning for discharge  - Provide patient education as appropriate  Outcome: Progressing  Goal: Maintains/Returns to pre admission functional level  Description: INTERVENTIONS:  - Perform BMAT or MOVE assessment daily    - Set and communicate daily mobility goal to care team and patient/family/caregiver     - Collaborate with rehabilitation services on mobility goals if consulted  - Record patient progress and toleration of activity level   Outcome: Progressing     Problem: Potential for Falls  Goal: Patient will remain free of falls  Description: INTERVENTIONS:  - Educate patient/family on patient safety including physical limitations  - Instruct patient to call for assistance with activity   - Consult OT/PT to assist with strengthening/mobility   - Keep Call bell within reach  - Keep bed low and locked with side rails adjusted as appropriate  - Keep care items and personal belongings within reach  - Initiate and maintain comfort rounds  - Make Fall Risk Sign visible to staff  - Apply yellow socks and bracelet for high fall risk patients  - Consider moving patient to room near nurses station  Outcome: Progressing     Problem: PAIN - ADULT  Goal: Verbalizes/displays adequate comfort level or baseline comfort level  Description: Interventions:  - Encourage patient to monitor pain and request assistance  - Assess pain using appropriate pain scale  - Administer analgesics based on type and severity of pain and evaluate response  - Implement non-pharmacological measures as appropriate and evaluate response  - Consider cultural and social influences on pain and pain management  - Notify physician/advanced practitioner if interventions unsuccessful or patient reports new pain  Outcome: Progressing     Problem: INFECTION - ADULT  Goal: Absence or prevention of progression during hospitalization  Description: INTERVENTIONS:  - Assess and monitor for signs and symptoms of infection  - Monitor lab/diagnostic results  - Monitor all insertion sites, i e  indwelling lines, tubes, and drains  - Monitor endotracheal if appropriate and nasal secretions for changes in amount and color  - [place] appropriate cooling/warming therapies per order  - Administer medications as ordered  - Instruct and encourage patient and family to use good hand hygiene technique  - Identify and instruct in appropriate isolation precautions for identified infection/condition  Outcome: Progressing  Goal: Absence of fever/infection during neutropenic period  Description: INTERVENTIONS:  - Monitor WBC    Outcome: Progressing     Problem: SAFETY ADULT  Goal: Maintain or return to baseline ADL function  Description: INTERVENTIONS:  -  Assess patient's ability to carry out ADLs; assess patient's baseline for ADL function and identify physical deficits which impact ability to perform ADLs (bathing, care of mouth/teeth, toileting, grooming, dressing, etc )  - Assess/evaluate cause of self-care deficits   - Assess range of motion  - Assess patient's mobility; develop plan if impaired  - Assess patient's need for assistive devices and provide as appropriate  - Encourage maximum independence but intervene and supervise when necessary  - Involve family in performance of ADLs  - Assess for home care needs following discharge   - Consider OT consult to assist with ADL evaluation and planning for discharge  - Provide patient education as appropriate  Outcome: Progressing  Goal: Maintains/Returns to pre admission functional level  Description: INTERVENTIONS:  - Perform BMAT or MOVE assessment daily    - Set and communicate daily mobility goal to care team and patient/family/caregiver     - Collaborate with rehabilitation services on mobility goals if consulted  - Record patient progress and toleration of activity level   Outcome: Progressing  Goal: Patient will remain free of falls  Description: INTERVENTIONS:  - Educate patient/family on patient safety including physical limitations  - Instruct patient to call for assistance with activity   - Consult OT/PT to assist with strengthening/mobility   - Keep Call bell within reach  - Keep bed low and locked with side rails adjusted as appropriate  - Keep care items and personal belongings within reach  - Initiate and maintain comfort rounds  - Make Fall Risk Sign visible to staff  - Apply yellow socks and bracelet for high fall risk patients  - Consider moving patient to room near nurses station  Outcome: Progressing     Problem: DISCHARGE PLANNING  Goal: Discharge to home or other facility with appropriate resources  Description: INTERVENTIONS:  - Identify barriers to discharge w/patient and caregiver  - Arrange for needed discharge resources and transportation as appropriate  - Identify discharge learning needs (meds, wound care, etc )  - Arrange for interpretive services to assist at discharge as needed  - Refer to Case Management Department for coordinating discharge planning if the patient needs post-hospital services based on physician/advanced practitioner order or complex needs related to functional status, cognitive ability, or social support system  Outcome: Progressing     Problem: Knowledge Deficit  Goal: Patient/family/caregiver demonstrates understanding of disease process, treatment plan, medications, and discharge instructions  Description: Complete learning assessment and assess knowledge base    Interventions:  - Provide teaching at level of understanding  - Provide teaching via preferred learning methods  Outcome: Progressing     Problem: Prexisting or High Potential for Compromised Skin Integrity  Goal: Skin integrity is maintained or improved  Description: INTERVENTIONS:  - Identify patients at risk for skin breakdown  - Assess and monitor skin integrity  - Assess and monitor nutrition and hydration status  - Monitor labs   - Assess for incontinence   - Turn and reposition patient  - Assist with mobility/ambulation  - Relieve pressure over bony prominences  - Avoid friction and shearing  - Provide appropriate hygiene as needed including keeping skin clean and dry  - Evaluate need for skin moisturizer/barrier cream  - Collaborate with interdisciplinary team   - Patient/family teaching  - Consider wound care consult   Outcome: Progressing

## 2022-07-09 NOTE — CASE MANAGEMENT
Case Management Discharge Planning Note    Patient name Ira Dennison  Location /-01 MRN [id number]  : 1975 Date 2022       Current Admission Date: 2022  Current Admission Diagnosis:Pre-operative examination   Patient Active Problem List    Diagnosis Date Noted    Closed displaced fracture of left femoral neck ([address] ) 2022    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 2022    Pre-operative examination 2022    Smoking 2022    Marijuana use 2022    DISHA (obstructive sleep apnea) 2022      LOS (days): 2  Geometric Mean LOS (GMLOS) (days):   Days to GMLOS:     OBJECTIVE:  Risk of Unplanned Readmission Score: 14 86         Current admission status: Inpatient   [facility]   [address]  [address]  [address] [facility] [address]  Phone: [phone] Fax: [phone] [address] [facility]  Phone: [phone] Fax: [phone]    Primary Care Provider: No primary care provider on file  Primary Insurance: BLUE CROSS  Secondary Insurance:     DISCHARGE DETAILS:         Other Referral/Resources/Interventions Provided:  Referral Comments: Received message from Stacie from [address] [place] [address] "I just heard back from [facility] they denied acute rehab due to lack of medical necessity  They did not offer a peer to peer"  SNF is recommended at this time

## 2022-07-09 NOTE — PLAN OF CARE
Problem: MOBILITY - ADULT  Goal: Maintain or return to baseline ADL function  Description: INTERVENTIONS:  -  Assess patient's ability to carry out ADLs; assess patient's baseline for ADL function and identify physical deficits which impact ability to perform ADLs (bathing, care of mouth/teeth, toileting, grooming, dressing, etc )  - Assess/evaluate cause of self-care deficits   - Assess range of motion  - Assess patient's mobility; develop plan if impaired  - Assess patient's need for assistive devices and provide as appropriate  - Encourage maximum independence but intervene and supervise when necessary  - Involve family in performance of ADLs  - Assess for home care needs following discharge   - Consider OT consult to assist with ADL evaluation and planning for discharge  - Provide patient education as appropriate  Outcome: Progressing  Goal: Maintains/Returns to pre admission functional level  Description: INTERVENTIONS:  - Perform BMAT or MOVE assessment daily    - Set and communicate daily mobility goal to care team and patient/family/caregiver  - Collaborate with rehabilitation services on mobility goals if consulted  - Perform Range of Motion 3 times a day  - Reposition patient every 2 hours    - Dangle patient 3 times a day  - Stand patient 3 times a day  - Ambulate patient 3 times a day  - Out of bed to chair 3 times a day   - Out of bed for meals 3 times a day  - Out of bed for toileting  - Record patient progress and toleration of activity level   Outcome: Progressing     Problem: Potential for Falls  Goal: Patient will remain free of falls  Description: INTERVENTIONS:  - Educate patient/family on patient safety including physical limitations  - Instruct patient to call for assistance with activity   - Consult OT/PT to assist with strengthening/mobility   - Keep Call bell within reach  - Keep bed low and locked with side rails adjusted as appropriate  - Keep care items and personal belongings within reach  - Initiate and maintain comfort rounds  - Make Fall Risk Sign visible to staff  - Offer Toileting every 2 Hours, in advance of need  - Initiate/Maintain bed/chair alarm  - Obtain necessary fall risk management equipment:   - Apply yellow socks and bracelet for high fall risk patients  - Consider moving patient to room near nurses station  Outcome: Progressing     Problem: PAIN - ADULT  Goal: Verbalizes/displays adequate comfort level or baseline comfort level  Description: Interventions:  - Encourage patient to monitor pain and request assistance  - Assess pain using appropriate pain scale  - Administer analgesics based on type and severity of pain and evaluate response  - Implement non-pharmacological measures as appropriate and evaluate response  - Consider cultural and social influences on pain and pain management  - Notify physician/advanced practitioner if interventions unsuccessful or patient reports new pain  Outcome: Progressing     Problem: INFECTION - ADULT  Goal: Absence or prevention of progression during hospitalization  Description: INTERVENTIONS:  - Assess and monitor for signs and symptoms of infection  - Monitor lab/diagnostic results  - Monitor all insertion sites, i e  indwelling lines, tubes, and drains  - Monitor endotracheal if appropriate and nasal secretions for changes in amount and color  - [place] appropriate cooling/warming therapies per order  - Administer medications as ordered  - Instruct and encourage patient and family to use good hand hygiene technique  - Identify and instruct in appropriate isolation precautions for identified infection/condition  Outcome: Progressing  Goal: Absence of fever/infection during neutropenic period  Description: INTERVENTIONS:  - Monitor WBC    Outcome: Progressing     Problem: SAFETY ADULT  Goal: Maintain or return to baseline ADL function  Description: INTERVENTIONS:  -  Assess patient's ability to carry out ADLs; assess patient's baseline for ADL function and identify physical deficits which impact ability to perform ADLs (bathing, care of mouth/teeth, toileting, grooming, dressing, etc )  - Assess/evaluate cause of self-care deficits   - Assess range of motion  - Assess patient's mobility; develop plan if impaired  - Assess patient's need for assistive devices and provide as appropriate  - Encourage maximum independence but intervene and supervise when necessary  - Involve family in performance of ADLs  - Assess for home care needs following discharge   - Consider OT consult to assist with ADL evaluation and planning for discharge  - Provide patient education as appropriate  Outcome: Progressing  Goal: Maintains/Returns to pre admission functional level  Description: INTERVENTIONS:  - Perform BMAT or MOVE assessment daily    - Set and communicate daily mobility goal to care team and patient/family/caregiver  - Collaborate with rehabilitation services on mobility goals if consulted  - Perform Range of Motion 3 times a day  - Reposition patient every 2 hours    - Dangle patient 3 times a day  - Stand patient 3 times a day  - Ambulate patient 3 times a day  - Out of bed to chair 3 times a day   - Out of bed for meals 3 times a day  - Out of bed for toileting  - Record patient progress and toleration of activity level   Outcome: Progressing  Goal: Patient will remain free of falls  Description: INTERVENTIONS:  - Educate patient/family on patient safety including physical limitations  - Instruct patient to call for assistance with activity   - Consult OT/PT to assist with strengthening/mobility   - Keep Call bell within reach  - Keep bed low and locked with side rails adjusted as appropriate  - Keep care items and personal belongings within reach  - Initiate and maintain comfort rounds  - Make Fall Risk Sign visible to staff  - Offer Toileting every 2 Hours, in advance of need  - Initiate/Maintain bed/chair alarm  - Obtain necessary fall risk management equipment:   - Apply yellow socks and bracelet for high fall risk patients  - Consider moving patient to room near nurses station  Outcome: Progressing     Problem: DISCHARGE PLANNING  Goal: Discharge to home or other facility with appropriate resources  Description: INTERVENTIONS:  - Identify barriers to discharge w/patient and caregiver  - Arrange for needed discharge resources and transportation as appropriate  - Identify discharge learning needs (meds, wound care, etc )  - Arrange for interpretive services to assist at discharge as needed  - Refer to Case Management Department for coordinating discharge planning if the patient needs post-hospital services based on physician/advanced practitioner order or complex needs related to functional status, cognitive ability, or social support system  Outcome: Progressing     Problem: Knowledge Deficit  Goal: Patient/family/caregiver demonstrates understanding of disease process, treatment plan, medications, and discharge instructions  Description: Complete learning assessment and assess knowledge base    Interventions:  - Provide teaching at level of understanding  - Provide teaching via preferred learning methods  Outcome: Progressing     Problem: Prexisting or High Potential for Compromised Skin Integrity  Goal: Skin integrity is maintained or improved  Description: INTERVENTIONS:  - Identify patients at risk for skin breakdown  - Assess and monitor skin integrity  - Assess and monitor nutrition and hydration status  - Monitor labs   - Assess for incontinence   - Turn and reposition patient  - Assist with mobility/ambulation  - Relieve pressure over bony prominences  - Avoid friction and shearing  - Provide appropriate hygiene as needed including keeping skin clean and dry  - Evaluate need for skin moisturizer/barrier cream  - Collaborate with interdisciplinary team   - Patient/family teaching  - Consider wound care consult   Outcome: Progressing
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
## 2022-07-09 NOTE — PROGRESS NOTES
Pt eating, drinking and voiding appropriately  RN reached out to [employer] with [facility]  Verbal order received to discontinue IVF

## 2022-07-09 NOTE — ARC ADMISSION
Patient approved for [facility], insurance authorization has been submitted to [employer]  Pending authorization number is [id number]  [facility] will continue to follow        Joya Diaz MS, OTR/L   [facility] Admissions

## 2022-07-09 NOTE — CASE MANAGEMENT
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 2022    Pre-operative examination 2022    Smoking 2022    Marijuana use 2022    DISHA (obstructive sleep apnea) 2022      LOS (days): 2  Geometric Mean LOS (GMLOS) (days):   Days to GMLOS:     OBJECTIVE:  Risk of Unplanned Readmission Score: 14 79         Current admission status: Inpatient   Preferred Pharmacy:   [address]  [address]  [address] [facility] [address]  Phone: [phone] Fax: [phone] [address] [facility]  Phone: [phone] Fax: [phone]    Primary Care Provider: No primary care provider on file  Primary Insurance: BLUE CROSS  Secondary Insurance:     DISCHARGE DETAILS:         Other Referral/Resources/Interventions Provided:  Referral Comments: POLLY spoke with Maggie Mendoza regarding patient  Patient is medically cleared for discharge  POLLY did send update to acute rehab  Currently awaiting determination at this time

## 2022-07-10 LAB
ANION GAP SERPL CALCULATED.3IONS-SCNC: 3 MMOL/L (ref 4–13)
BASOPHILS # BLD AUTO: 0.05 THOUSANDS/ΜL (ref 0–0.1)
BASOPHILS NFR BLD AUTO: 1 % (ref 0–1)
BUN SERPL-MCNC: 6 MG/DL (ref 5–25)
CALCIUM SERPL-MCNC: 8.6 MG/DL (ref 8.3–10.1)
CHLORIDE SERPL-SCNC: 103 MMOL/L (ref 100–108)
CO2 SERPL-SCNC: 30 MMOL/L (ref 21–32)
CREAT SERPL-MCNC: 0.68 MG/DL (ref 0.6–1.3)
EOSINOPHIL # BLD AUTO: 0.18 THOUSAND/ΜL (ref 0–0.61)
EOSINOPHIL NFR BLD AUTO: 2 % (ref 0–6)
ERYTHROCYTE [DISTWIDTH] IN BLOOD BY AUTOMATED COUNT: 12.9 % (ref 11.6–15.1)
GLUCOSE SERPL-MCNC: 93 MG/DL (ref 65–140)
HCT VFR BLD AUTO: 31.3 % (ref 36.5–46.1)
HGB BLD-MCNC: 10.2 G/DL (ref 12–15.4)
IMM GRANULOCYTES # BLD AUTO: 0.05 THOUSAND/UL (ref 0–0.2)
IMM GRANULOCYTES NFR BLD AUTO: 1 % (ref 0–2)
LYMPHOCYTES # BLD AUTO: 1.62 THOUSANDS/ΜL (ref 0.6–4.47)
LYMPHOCYTES NFR BLD AUTO: 16 % (ref 14–44)
MCH RBC QN AUTO: 30 PG (ref 26.8–34.3)
MCHC RBC AUTO-ENTMCNC: 32.6 G/DL (ref 31.4–37.4)
MCV RBC AUTO: 92 FL (ref 82–98)
MONOCYTES # BLD AUTO: 0.88 THOUSAND/ΜL (ref 0.17–1.22)
MONOCYTES NFR BLD AUTO: 9 % (ref 4–12)
NEUTROPHILS # BLD AUTO: 7.17 THOUSANDS/ΜL (ref 1.85–7.62)
NEUTS SEG NFR BLD AUTO: 71 % (ref 43–75)
NRBC BLD AUTO-RTO: 0 /100 WBCS
PLATELET # BLD AUTO: 241 THOUSANDS/UL (ref 149–390)
PMV BLD AUTO: 9.4 FL (ref 8.9–12.7)
POTASSIUM SERPL-SCNC: 3.9 MMOL/L (ref 3.5–5.3)
RBC # BLD AUTO: 3.4 MILLION/UL (ref 3.88–5.12)
SODIUM SERPL-SCNC: 136 MMOL/L (ref 136–145)
WBC # BLD AUTO: 9.95 THOUSAND/UL (ref 4.31–10.16)

## 2022-07-10 PROCEDURE — 80048 BASIC METABOLIC PNL TOTAL CA: CPT | Performed by: PHYSICIAN ASSISTANT

## 2022-07-10 PROCEDURE — 97530 THERAPEUTIC ACTIVITIES: CPT

## 2022-07-10 PROCEDURE — 97535 SELF CARE MNGMENT TRAINING: CPT

## 2022-07-10 PROCEDURE — 99024 POSTOP FOLLOW-UP VISIT: CPT | Performed by: PHYSICIAN ASSISTANT

## 2022-07-10 PROCEDURE — 85025 COMPLETE CBC W/AUTO DIFF WBC: CPT | Performed by: PHYSICIAN ASSISTANT

## 2022-07-10 RX ORDER — TRAMADOL HYDROCHLORIDE 50 MG/1
50 TABLET ORAL EVERY 8 HOURS PRN
Status: DISCONTINUED | OUTPATIENT
Start: 2022-07-10 | End: 2022-07-13 | Stop reason: HOSPADM

## 2022-07-10 RX ADMIN — NICOTINE 1 PATCH: 14 PATCH, EXTENDED RELEASE TRANSDERMAL at 08:33

## 2022-07-10 RX ADMIN — MULTIPLE VITAMINS W/ MINERALS TAB 1 TABLET: TAB ORAL at 08:36

## 2022-07-10 RX ADMIN — OXYCODONE HYDROCHLORIDE 10 MG: 10 TABLET ORAL at 09:23

## 2022-07-10 RX ADMIN — ENOXAPARIN SODIUM 30 MG: 30 INJECTION SUBCUTANEOUS at 22:26

## 2022-07-10 RX ADMIN — POLYETHYLENE GLYCOL 3350 17 G: 17 POWDER, FOR SOLUTION ORAL at 08:34

## 2022-07-10 RX ADMIN — ACETAMINOPHEN 975 MG: 325 TABLET, FILM COATED ORAL at 22:26

## 2022-07-10 RX ADMIN — ACETAMINOPHEN 975 MG: 325 TABLET, FILM COATED ORAL at 05:31

## 2022-07-10 RX ADMIN — TRAMADOL HYDROCHLORIDE 50 MG: 50 TABLET, COATED ORAL at 16:04

## 2022-07-10 RX ADMIN — TRAZODONE HYDROCHLORIDE 50 MG: 50 TABLET ORAL at 22:26

## 2022-07-10 RX ADMIN — ENOXAPARIN SODIUM 30 MG: 30 INJECTION SUBCUTANEOUS at 08:34

## 2022-07-10 RX ADMIN — DOCUSATE SODIUM 100 MG: 100 CAPSULE, LIQUID FILLED ORAL at 08:36

## 2022-07-10 RX ADMIN — ACETAMINOPHEN 975 MG: 325 TABLET, FILM COATED ORAL at 16:04

## 2022-07-10 RX ADMIN — SENNOSIDES 8.6 MG: 8.6 TABLET, FILM COATED ORAL at 08:36

## 2022-07-10 RX ADMIN — DOCUSATE SODIUM 100 MG: 100 CAPSULE, LIQUID FILLED ORAL at 17:13

## 2022-07-10 NOTE — OCCUPATIONAL THERAPY NOTE
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    Smoking    Marijuana use    DISHA (obstructive sleep apnea)            07/10/22 1305   OT Last Visit   OT Visit Date 07/10/22   Note Type   Note Type Treatment   Restrictions/Precautions   Weight Bearing Precautions Per Order Yes   LLE Weight Bearing Per Order TTWB   Other Precautions Fall Risk;Pain;WBS   Pain Assessment   Pain Score 6   Pain Location/Orientation Orientation: Left; Location: Leg   ADL   Where Assessed Chair   Grooming Assistance 5  Supervision/Setup   Grooming Comments seated   UB Bathing Assistance 5  Supervision/Setup   UB Bathing Comments seated   LB Bathing Assistance 4  Minimal Assistance   LB Bathing Deficit Left lower leg including foot   LB Bathing Comments and assist for balance in stance   UB Dressing Assistance 5  Supervision/Setup   UB Dressing Comments seated   LB Dressing Assistance 3  Moderate Assistance   LB Dressing Deficit Don/doff L sock;Pull up over hips   [address]  4  Minimal Assistance   Toileting Deficit Clothing management up;Clothing management down   Bed Mobility   Supine to Sit 5  Supervision   Additional items Assist x 1; Increased time required   Additional Comments OOB at end of session   Transfers   Sit to Stand 4  Minimal assistance   Additional items Assist x 1   Stand to Sit 4  Minimal assistance   Additional items Assist x 1   Stand pivot 4  Minimal assistance   Additional items Assist x 1   Additional Comments RW   Functional Mobility   Functional Mobility 4  Minimal assistance   Additional Comments fatigues quickly and then has difficulity maintain TTWB requirng cues for same   Additional items Rolling walker   Cognition   Overall Cognitive Status [facility]   Arousal/Participation Alert; Responsive; Cooperative   Attention Within functional limits   Orientation Level Oriented X4   Memory Within functional limits   Following Commands Follows all commands and directions without difficulty   Comments Pleasant and cooperative   Activity Tolerance   Activity Tolerance Patient limited by fatigue   Medical Staff Made Aware NSG aware   Assessment   Assessment Pt was seen this date for OT tx session focusing on self care tasks, bed mobility, sit to stand progressions, standing tolerance, tranfers, functional mobility, safety awareness, compensatory techniques, energy conservation, and overall activity tolerance  Pt presents supine and is agreeable to OT tx session  Pt  completes previously mentioned tasks at documented assist levels please see above in flow sheet  Pt continues to require overall Min A for transfers and mobility and S- mod A for self care tasks  Pt is overall limited by WBS, decreased balance, increased fatigue, decreased strength, endurance, and activity tolerance and continues to function below baseline level  Pt resting OOB in chair at end of session with all needs in reach and alarm on  Pt would benefit from continued OT Tx to improve overall functional abilities and increase independence  Will continue to follow with current POC  Plan   Treatment Interventions ADL retraining;Functional transfer training; Endurance training;Patient/family training;Equipment evaluation/education; Compensatory technique education; Activityengagement   Goal Expiration Date 07/22/22   OT Treatment Day 1   OT Frequency 3-5x/wk   Recommendation   OT Discharge Recommendation Post acute rehabilitation services   AM-PAC Daily Activity Inpatient   Lower Body Dressing 2   Bathing 3   Toileting 3   Upper Body Dressing 3   Grooming 4   Eating 4   Daily Activity Raw Score 19   Daily Activity Standardized Score (Calc for Raw Score >=11) 40 22   AM-PAC Applied Cognition Inpatient   Following a Speech/Presentation 4 Understanding Ordinary Conversation 4   Taking Medications 4   Remembering Where Things Are Placed or Put Away 4   Remembering List of 4-5 Errands 4   Taking Care of Complicated Tasks 4   Applied Cognition Raw Score 24   Applied Cognition Standardized Score 62 21

## 2022-07-10 NOTE — CASE MANAGEMENT
Case Management Progress Note    Patient name Jagruti Botello  Location /-06 MRN [id number]  : 1975 Date 7/10/2022       LOS (days): 3  Geometric Mean LOS (GMLOS) (days):   Days to GMLOS:        OBJECTIVE:        Current admission status: Inpatient  Preferred Pharmacy:   [address]  [address] [facility] [address]  Phone: [phone] Fax: 1150 [facility] [address] [facility] [address] [address] [facility] [address] [address]  Phone: [phone] Fax: [phone]    Primary Care Provider: No primary care provider on file  Primary Insurance: BLUE CROSS  Secondary Insurance:     PROGRESS NOTE:  Made patient aware that inpatient acute rehab was denied by her insurance  Inpatient skilled list given to patient to make additional choices  Also made her aware that there is a choice in for [facility] CM to follow up with patient in the afternoon  1530: patient gave choices # 1  [facility] , 2  [facility], 3  [facility], 4   [facility] referrals made

## 2022-07-10 NOTE — PROGRESS NOTES
GI History and Physical exam NOTE        REQUESTING PHYSICIAN    Dustin Knight MD    REASON FOR CONSULTATION    Anthony Art is a 50 year old male who was referred for consultation for rectal bleeding and personal history of colon polyps.    HISTORY OF PRESENT ILLNESS  Patient is a 50-year-old man with a history of hyperlipidemia as well as anxiety and known colon polyps last colonoscopy in 2018 here for further evaluation of intermittent rectal bleeding rectal urgency and of course his history of colon polyps.  Patient states he has had some intermittent rectal bleeding as well as sense of fecal urgency after defecation.  He says that he will see sometimes blood dripping into the toilet bowl after defecating.  He says that his stools are formed and regular.  He does not have excessive straining with defecation.  He is not having abdominal pain nausea or vomiting.  His weight has been stable.  Last colonoscopy was in 2018 at that time he had to have multiple polyps removed from an infected 3 colonoscopies in that year to remove all of the polyps.  Comprehensive review of systems is otherwise negative.    REVIEW OF SYSTEMS    See HPI for further details.  Review of systems, otherwise, negative.     MEDICAL HISTORY    Past Medical History:   Diagnosis Date   • Anxiety    • Blood in stool    • History of colon polyps    • Rectal incontinence     slight and occasional   • Smoking        SURGICAL HISTORY    Past Surgical History:   Procedure Laterality Date   • Colonoscopy  06/07/2018    multiple polyps (ascending colon tattooed)   • Colonoscopy diagnostic  07/30/2018    Dr. Andersen: sessile serrated adenoma, repeat colon in 4-6 months    • [place] tooth extraction         FAMILY HISTORY    Family History   Problem Relation Age of Onset   • Cancer Father         lung cancer. smoker       SOCIAL HISTORY    Social History     Tobacco Use   • Smoking status: Former Smoker     Packs/day: 1.00     Types: Cigarettes     Quit  Orthopedics Progress / Post-op Note  Kath Perez 40 y o  adult MRN: [id number]  Unit/Bed#: -01      Subjective:  40 y  o adult post operative day 2 s/p  left hip ORIF  Patient reports slowly improving pain in the left hip this AM     Denies any LLE numbness/tingling  No acute issues overnight        Objective:    Labs:  0   Lab Value Date/Time    HCT 31 3 (L) 07/10/2022 0631    HCT 31 6 (L) 07/09/2022 0511    HCT 26 5 (L) 07/08/2022 1233    HGB 10 2 (L) 07/10/2022 0631    HGB 10 3 (L) 07/09/2022 0511    HGB 8 9 (L) 07/08/2022 1233    INR 0 97 07/07/2022 0444    WBC 9 95 07/10/2022 0631    WBC 10 39 (H) 07/09/2022 0511    WBC 9 14 07/08/2022 1233       Meds:    Current Facility-Administered Medications:     acetaminophen (TYLENOL) tablet 975 mg, 975 mg, Oral, Q8H [address], Olya Mitchell MD, 975 mg at 07/10/22 0531    aluminum-magnesium hydroxide-simethicone (MYLANTA) oral suspension 30 mL, 30 mL, Oral, Q6H PRN, Olya Mitchell MD    calcium carbonate (TUMS) chewable tablet 1,000 mg, 1,000 mg, Oral, Daily PRN, Olya Mitchell MD    docusate sodium (COLACE) capsule 100 mg, 100 mg, Oral, BID, Onur Long MD, 100 mg at 07/09/22 1849    enoxaparin (LOVENOX) subcutaneous injection 30 mg, 30 mg, Subcutaneous, Q12H [address], Olya Mitchell MD, 30 mg at 07/09/22 2044    HYDROmorphone (DILAUDID) injection 0 5 mg, 0 5 mg, Intravenous, Q3H PRN, Olya Mitchell MD, 0 5 mg at 07/09/22 1630    multivitamin-minerals (CENTRUM) tablet 1 tablet, 1 tablet, Oral, Daily, Olya Mitchell MD, 1 tablet at 07/09/22 1009    nicotine (NICODERM CQ) 14 mg/24hr TD 24 hr patch 1 patch, 1 patch, Transdermal, Daily, Olya Mitchell MD, 1 patch at 07/09/22 1009    ondansetron (ZOFRAN) injection 4 mg, 4 mg, Intravenous, Q6H PRN, Olya Mitchell MD    oxyCODONE (ROXICODONE) immediate release tablet 10 mg, 10 mg, Oral, Q4H PRN, Olya Mitchell MD, 10 mg at 07/09/22 2120    oxyCODONE (ROXICODONE) IR tablet 5 mg, 5 mg, Oral, Q4H PRN, Della Guzmán MD    polyethylene glycol (MIRALAX) packet 17 g, 17 g, Oral, Daily, Della Guzmán MD, 17 g at 07/09/22 1009    senna (SENOKOT) tablet 8 6 mg, 1 tablet, Oral, Daily, Della Guzmán MD, 8 6 mg at 07/09/22 1009    simethicone (MYLICON) chewable tablet 80 mg, 80 mg, Oral, 4x Daily PRN, Della Guzmán MD    traZODone (DESYREL) tablet 50 mg, 50 mg, Oral, HS, Della Guzmán MD, 50 mg at 07/09/22 2120    Blood Culture:   No results found for: BLOODCX    Wound Culture:   No results found for: WOUNDCULT    Ins and Outs:  I/O last 24 hours: In: 882 [P O :882]  Out: 3450 [Urine:3450]      Orthopedic Physical Exam:  Vitals:    07/10/22 0749   BP: 118/93   Pulse: 88   Resp:    Temp: (!) 97 4 °F (36 3 °C)   SpO2: 92%   Lying in bed comfortably, NAD, breathing unlabored  left Lower Extremity extremity:  · Mepilex Dressing is C/D/I, no saturation  · Thigh remains soft  · No surrounding ecchymosis or erythema  · 5/5 strength ankle DF/PF, EHL/FHL  · Sensation intact  · Palpable dorsalis pedis pulse  · Toes warm, sensate, mobile with good cap refill  · No calf tenderness or pitting edema      _*_*_*_*_*_*_*_*_*_*_*_*_*_*_*_*_*_*_*_*_*_*_*_*_*_*_*_*_*_*_*_*_*_*_*_*_*_*_*_*_*    Assessment: 40 y  o adult post operative day 2 s/p  left hip ORIF  Plan:  · TTWB LLE  · Up and out of bed with assistance  · DVT prophylaxis (Lovenox 30mg BID)  · Analgesics  · PT/OT  · Dispo: Ortho will follow  PT recommending short term rehab, awaiting placement    · Patient noted to have acute blood loss anemia due to a drop in Hbg of > 2 0g from preop levels, will monitor vital signs and resuscitate with IV fluids as needed        Aileen Madera PA-C date: 2020     Years since quittin.7   • Smokeless tobacco: Never Used   Substance Use Topics   • Alcohol use: Yes     Comment: couple drinks per day. liquor   • Drug use: No       ALLERGIES    ALLERGIES:   Allergen Reactions   • Amoxicillin HIVES     40 yrs ago.       MEDICATIONS    Current Facility-Administered Medications   Medication Dose Route Frequency Provider Last Rate Last Admin   • lidocaine-sodium bicarbonate 0.9-8.4% for J-tip administration 0.5-1 mL  0.5-1 mL Subcutaneous PRN Jael Toth CRNA        Or   • lidocaine 1 % injection 5-10 mg  5-10 mg Subcutaneous PRN Jael Toth CRNA       • metoPROLOL tartrate (LOPRESSOR) tablet 25 mg  25 mg Oral Once PRN Jael Toth CRNA       • sodium chloride 0.9 % flush bag 25 mL  25 mL Intravenous PRN Jael Toth CRNA       • sodium chloride (PF) 0.9 % injection 2 mL  2 mL Intracatheter 2 times per day Jael Toth CRNA       • lactated ringers infusion   Intravenous Continuous Jael Toth CRNA 10 mL/hr at 21 1428 New Bag at 21 1428       PHYSICAL EXAM    Vital Signs:    Visit Vitals  /79   Pulse (!) 58   Temp 98.4 °F (36.9 °C) (Temporal)   Resp 14   Ht 5' 6.5\" (1.689 m)   Wt 77.1 kg   SpO2 97%   BMI 27.03 kg/m²     Constitutional:  Patient is a healthy-appearing man in no apparent distress.    HENT:  Normocephalic, atraumatic.  Oropharynx moist.  Nose normal.   Neck:  Normal neck with normal range of motion.   Cardiovascular:  Normal heart rate.  Normal rhythm.  No murmurs.  No rubs.  No gallops.   Respiratory:  Normal breath sounds.  No respiratory distress.  No wheezing.  No chest tenderness.   GI:  Abdomen soft nondistended nontender and with normoactive bowel sounds.  Rectal:  Rectal examination deferred.  Integument:  Warm.  Dry.  No erythema.  No rash.     Neurologic:  Alert and oriented x3.  CN 2-12 normal.  Normal motor function.  Normal sensory function.  No focal deficits noted.     INTAKE  & OUTPUT    No intake/output data recorded.  No intake/output data recorded.    LABS  Labs Reviewed - No data to display      ASSESSMENT    Active Problems:    * No active hospital problems. *      1.  Rectal bleeding with associated rectal urgency.  2. Personal history of colon polyps.    PLAN    See orders.  The plan was discussed with the patient and/or family.     1.  Proceed with colonoscopy as planned.  Patient understands risks benefits and alternatives and would like to proceed with above colonoscopy.    The patient was interviewed and examined at the bedside and the chart reviewed.  The previous observations, recommendations, and conclusions were reviewed.  Thank you so much for asking me to see this interesting patient.

## 2022-07-10 NOTE — PLAN OF CARE
Problem: OCCUPATIONAL THERAPY ADULT  Goal: Performs self-care activities at highest level of function for planned discharge setting  See evaluation for individualized goals  Description: Treatment Interventions: ADL retraining, Functional transfer training, Endurance training, Patient/family training, Equipment evaluation/education, Compensatory technique education, Activityengagement          See flowsheet documentation for full assessment, interventions and recommendations  Outcome: Progressing  Note: Limitation: Decreased ADL status, Decreased endurance, Decreased self-care trans, Decreased high-level ADLs  Prognosis: Good  Assessment: Pt was seen this date for OT tx session focusing on self care tasks, bed mobility, sit to stand progressions, standing tolerance, tranfers, functional mobility, safety awareness, compensatory techniques, energy conservation, and overall activity tolerance  Pt presents supine and is agreeable to OT tx session  Pt  completes previously mentioned tasks at documented assist levels please see above in flow sheet  Pt continues to require overall Min A for transfers and mobility and S- mod A for self care tasks  Pt is overall limited by WBS, decreased balance, increased fatigue, decreased strength, endurance, and activity tolerance and continues to function below baseline level  Pt resting OOB in chair at end of session with all needs in reach and alarm on  Pt would benefit from continued OT Tx to improve overall functional abilities and increase independence  Will continue to follow with current POC  OT Discharge Recommendation: Post acute rehabilitation services  OT - OK to Discharge:  Yes

## 2022-07-11 PROCEDURE — NC001 PR NO CHARGE: Performed by: PHYSICIAN ASSISTANT

## 2022-07-11 PROCEDURE — 97116 GAIT TRAINING THERAPY: CPT

## 2022-07-11 PROCEDURE — 97530 THERAPEUTIC ACTIVITIES: CPT

## 2022-07-11 RX ADMIN — ACETAMINOPHEN 975 MG: 325 TABLET, FILM COATED ORAL at 14:32

## 2022-07-11 RX ADMIN — TRAMADOL HYDROCHLORIDE 50 MG: 50 TABLET, COATED ORAL at 18:01

## 2022-07-11 RX ADMIN — ACETAMINOPHEN 975 MG: 325 TABLET, FILM COATED ORAL at 05:34

## 2022-07-11 RX ADMIN — ACETAMINOPHEN 975 MG: 325 TABLET, FILM COATED ORAL at 22:37

## 2022-07-11 RX ADMIN — SENNOSIDES 8.6 MG: 8.6 TABLET, FILM COATED ORAL at 08:59

## 2022-07-11 RX ADMIN — TRAZODONE HYDROCHLORIDE 50 MG: 50 TABLET ORAL at 22:37

## 2022-07-11 RX ADMIN — MULTIPLE VITAMINS W/ MINERALS TAB 1 TABLET: TAB ORAL at 08:56

## 2022-07-11 RX ADMIN — DOCUSATE SODIUM 100 MG: 100 CAPSULE, LIQUID FILLED ORAL at 17:58

## 2022-07-11 RX ADMIN — ENOXAPARIN SODIUM 30 MG: 30 INJECTION SUBCUTANEOUS at 22:38

## 2022-07-11 RX ADMIN — TRAMADOL HYDROCHLORIDE 50 MG: 50 TABLET, COATED ORAL at 00:29

## 2022-07-11 RX ADMIN — ENOXAPARIN SODIUM 30 MG: 30 INJECTION SUBCUTANEOUS at 08:56

## 2022-07-11 RX ADMIN — NICOTINE 1 PATCH: 14 PATCH, EXTENDED RELEASE TRANSDERMAL at 08:57

## 2022-07-11 RX ADMIN — POLYETHYLENE GLYCOL 3350 17 G: 17 POWDER, FOR SOLUTION ORAL at 08:58

## 2022-07-11 RX ADMIN — TRAMADOL HYDROCHLORIDE 50 MG: 50 TABLET, COATED ORAL at 08:59

## 2022-07-11 RX ADMIN — DOCUSATE SODIUM 100 MG: 100 CAPSULE, LIQUID FILLED ORAL at 08:56

## 2022-07-11 NOTE — PROGRESS NOTES
Progress Note - Orthopedics   Kath Perez 40 y o  adult MRN: [id number]      Subjective:  No acute events overnight  No acute distress  Denies fevers, chills, SOB  Objective:  A 10 point ROS was performed; negative except as noted above  Labs:  Lab Results   Component Value Date/Time    WBC 9 95 07/10/2022 06:31 AM    HGB 10 2 (L) 07/10/2022 06:31 AM       Physical:  Vitals:    07/11/22 0027   BP: 106/69   Pulse: 82   Resp:    Temp: 98 2 °F (36 8 °C)   SpO2: 94%     Musculoskeletal: left Lower Extremity  · Dressing C/D/I  · TTP thigh  · SILT michi/saph/sp/dp/tib nerve distributions   · +FHL/EHL, +ankle DF/PF      · Toes WWP    Assessment:    40 y o  adult post op day #3 from Left femoral neck fx ORIF    Plan:  · TTWB LLE  · PT/OT  · Pain control  · DVT ppx  · Dispo: [id number] for discharge from ortho perspective, awaiting rehab placement      Artur Willett MD

## 2022-07-11 NOTE — CASE MANAGEMENT
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 [address]  Phone: [phone] Fax: 5850 [facility] 330 [facility] [address] [facility]  Phone: [phone] Fax: [phone]    Primary Care Provider: No primary care provider on file  Primary Insurance: BLUE CROSS  Secondary Insurance:     DISCHARGE DETAILS:    Other Referral/Resources/Interventions Provided:  Interventions: Short Term Rehab    Referral Comments: Per pt's request, CM continues to advocate for placement at [address] [place] [address] for STR -- Per representative Gurwinder Carrillo  (admissions), pt appears appriate for placement  However, to ensure pt is medicated properly as per her previous med list prior to admission (including hormonal medications), Brandie Negrete asks that pt's medications are "clarified and in place prior to her dc from acute unit " Ana's team is particularly concerned as pt reported (during their introductory call) that her hormonal medications are not currently being taken during her hospital stay which may lead to an increase in depression and suicidality   Pt had previously been taking hormonal medications prior to admission  CM spoke with provider to advise him of same and request that he review med list with pt and adjust accordingly  Pt continues to request placement a [facility]  Discussion held with pt regarding short-term disability paperwork -- CM advised pt that paperwork must be handled via PCP  Pt confirmed understanding of same,     4:45pm:    [address] admissions representative Gael Baez requests attending MD contact TCU's MD (Dr Valery Ormond) via TT to discuss medication concerns  If pt needs home meds, they should be administered and ordered prior to admission  Per Gael Baez , pt will need a single room upon discharge  Authorization must also be obtained prior to discharge  CM will continue to follow for alternate accepting facility in the event that [address] [place] [address] will not accept  CM will continue to follow

## 2022-07-11 NOTE — PHYSICAL THERAPY NOTE
Physical Therapy Progress Note     07/11/22 1221   PT Last Visit   PT Visit Date 07/11/22   Note Type   Note Type Treatment   Pain Assessment   Pain Assessment Tool FLACC   Pain Rating: FLACC (Rest) - Face 1   Pain Rating: FLACC (Rest) - Legs 0   Pain Rating: FLACC (Rest) - Activity 0   Pain Rating: FLACC (Rest) - Cry 1   Pain Rating: FLACC (Rest) - Consolability 0   Score: FLACC (Rest) 2   Pain Rating: FLACC (Activity) - Face 2   Pain Rating: FLACC (Activity) - Legs 1   Pain Rating: FLACC (Activity) - Activity 1   Pain Rating: FLACC (Activity) - Cry 2   Pain Rating: FLACC (Activity) - Consolability 1   Score: FLACC (Activity) 7   Restrictions/Precautions   LLE Weight Bearing Per Order TTWB   Other Precautions WBS; Fall Risk;Pain   Subjective   Subjective Pt encountered seated in recliner, pleasant and agreeable to treatment  Reports controlled pain at rest, but elevates significantly with activity  No other complaints offered at this time  Transfers   Sit to Stand 5  Supervision   Additional items Assist x 1; Armrests; Increased time required;Verbal cues   Stand to Sit 5  Supervision   Additional items Assist x 1; Armrests; Increased time required;Verbal cues   Ambulation/Elevation   Gait pattern Step to;Short stride; Foward flexed; Inconsistent natasha;Decreased L stance;Decreased foot clearance;Narrow ROMULO; Improper Weight shift   Gait Assistance 4  Minimal assist   Additional items Assist x 1   Assistive Device Rolling walker   Distance 40' x 2   Balance   Static Sitting Fair +   Static Standing Fair   Ambulatory Fair -   Activity Tolerance   Activity Tolerance Patient tolerated treatment well;Patient limited by fatigue;Patient limited by pain   Nurse DEIDRE [address], RN   Assessment   Prognosis Good   Problem List Decreased strength;Decreased endurance; Impaired balance;Decreased mobility;Pain;Orthopedic restrictions   Assessment pt demonstrated improved functional mobilty & endurance this session, performing all transfers consistantly without need for physical assist, then ambulating near household distances prior to requiring seated rests to recover  pt remains significantly limited by pain in all aspects of mobiilty at this time, which limits overall activity toleraqnce presently  Pt Also required instructions for LLE positioning & sequencing to improve ability to maintain WBS & protect operated hip at this time during acute phase of healing  Pt was able to maintain WBS throughout session  Pt will benefit from further practice to improve proficiency with these tasks wihtout instructions in upcoming sessions  Plan to trial steps next session to assess for ability to enter/exit apartment  Anticipate pt will progress quickly in this area as pain resolves, but stairs do remain primary physical barrier to d/c at this itme  Will benefit from rehab at d/c to address deficits prior to transitioning home at higher level of functional independence  Barriers to Discharge Decreased caregiver support; Inaccessible home environment   Barriers to Discharge Comments full flight of steps to enter apartment, will be home alone during the day   Goals   Patient Goals to get better   STG Expiration Date 07/22/22   PT Treatment Day 1   Plan   Treatment/Interventions Functional transfer training;LE strengthening/ROM; Elevations; Endurance training; Therapeutic exercise;Patient/family training;Equipment eval/education; Bed mobility;Gait training   Progress Progressing toward goals   PT Frequency Other (Comment)  (3-6x/week)   Recommendation   PT Discharge Recommendation Post acute rehabilitation services   Equipment Recommended [facility] Recommended Wheeled walker   AM-PAC Basic Mobility Inpatient   Turning in Bed Without Bedrails 3   Lying on Back to Sitting on Edge of Flat Bed 3   Moving Bed to Chair 3   Standing Up From Chair 4   Walk in Room 3   Climb 3-5 Stairs 2   Basic Mobility Inpatient Raw Score 18   Basic Mobility Standardized Score 41 05   Highest Level Of Mobility   JH-HLM Goal 6: Walk 10 steps or more   JH-HLM Achieved 7: Walk 25 feet or more     Bryan Mayberry PTA    An [facility] Basic Mobility Standardized Score of 40 78 suggests pt would benefit from post acute rehab

## 2022-07-11 NOTE — PLAN OF CARE
Problem: PHYSICAL THERAPY ADULT  Goal: Performs mobility at highest level of function for planned discharge setting  See evaluation for individualized goals  Description: Treatment/Interventions: ADL retraining, Functional transfer training, LE strengthening/ROM, Patient/family training, Equipment eval/education, Bed mobility, Gait training, Spoke to nursing, Spoke to case management, OT  Equipment Recommended: Arabella Ward       See flowsheet documentation for full assessment, interventions and recommendations  Outcome: Progressing  Note: Prognosis: Good  Problem List: Decreased strength, Decreased endurance, Impaired balance, Decreased mobility, Pain, Orthopedic restrictions  Assessment: pt demonstrated improved functional mobilty & endurance this session, performing all transfers consistantly without need for physical assist, then ambulating near household distances prior to requiring seated rests to recover  pt remains significantly limited by pain in all aspects of mobiilty at this time, which limits overall activity toleraqnce presently  Pt Also required instructions for LLE positioning & sequencing to improve ability to maintain WBS & protect operated hip at this time during acute phase of healing  Pt was able to maintain WBS throughout session  Pt will benefit from further practice to improve proficiency with these tasks wihtout instructions in upcoming sessions  Plan to trial steps next session to assess for ability to enter/exit apartment  Anticipate pt will progress quickly in this area as pain resolves, but stairs do remain primary physical barrier to d/c at this itme  Will benefit from rehab at d/c to address deficits prior to transitioning home at higher level of functional independence    Barriers to Discharge: Decreased caregiver support, Inaccessible home environment  Barriers to Discharge Comments: full flight of steps to enter apartment, will be home alone during the day     PT Discharge Recommendation: Post acute rehabilitation services          See flowsheet documentation for full assessment

## 2022-07-11 NOTE — PROGRESS NOTES
Spiritual Care Progress Note    2022  Patient: Kath Perez : 1975  Admission Date & Time: 20228  MRN: [id number] [facility]: [id number]       visited patient Kath (nathan ALCANTARA-med) during unit rounds   provided introduction to spiritual care, affirmed patient's emotional needs, explored meaningful aspects of of patient's life, and provided supportive, empathetic listening  Patient described feeling better than yesterday and noted the importance of her friends and community as support systems  Patient expressed gratitude for  presence  Spiritual care will remain available               Chaplaincy Interventions Utilized:   Empowerment: Encouraged self-care and Provided chaplaincy education    Exploration: Explored emotional needs & resources    Relationship Building: Cultivated a relationship of care and support and Listened empathically      Chaplaincy Outcomes Achieved:  Expressed gratitude, Expressed humor, and Expressed intermediate hope      Spiritual Coping Strategies Utilized:   Spiritual meaning     22 1000   Clinical Encounter Type   Visited With Patient   Routine Visit Introduction

## 2022-07-11 NOTE — PLAN OF CARE
Problem: MOBILITY - ADULT  Goal: Maintain or return to baseline ADL function  Description: INTERVENTIONS:  -  Assess patient's ability to carry out ADLs; assess patient's baseline for ADL function and identify physical deficits which impact ability to perform ADLs (bathing, care of mouth/teeth, toileting, grooming, dressing, etc )  - Assess/evaluate cause of self-care deficits   - Assess range of motion  - Assess patient's mobility; develop plan if impaired  - Assess patient's need for assistive devices and provide as appropriate  - Encourage maximum independence but intervene and supervise when necessary  - Involve family in performance of ADLs  - Assess for home care needs following discharge   - Consider OT consult to assist with ADL evaluation and planning for discharge  - Provide patient education as appropriate  Outcome: Progressing  Goal: Maintains/Returns to pre admission functional level  Description: INTERVENTIONS:  - Perform BMAT or MOVE assessment daily    - Set and communicate daily mobility goal to care team and patient/family/caregiver  - Collaborate with rehabilitation services on mobility goals if consulted  - Perform Range of Motion 3 times a day  - Reposition patient every 3 hours    - Dangle patient 3 times a day  - Stand patient 3 times a day  - Ambulate patient 3 times a day  - Out of bed to chair 3 times a day   - Out of bed for meals 3 times a day  - Out of bed for toileting  - Record patient progress and toleration of activity level   Outcome: Progressing     Problem: Potential for Falls  Goal: Patient will remain free of falls  Description: INTERVENTIONS:  - Educate patient/family on patient safety including physical limitations  - Instruct patient to call for assistance with activity   - Consult OT/PT to assist with strengthening/mobility   - Keep Call bell within reach  - Keep bed low and locked with side rails adjusted as appropriate  - Keep care items and personal belongings within reach  - Initiate and maintain comfort rounds  - Make Fall Risk Sign visible to staff  - Offer Toileting every  Hours, in advance of need  - Initiate/Maintain alarm  - Obtain necessary fall risk management equipment:   - Apply yellow socks and bracelet for high fall risk patients  - Consider moving patient to room near nurses station  Outcome: Progressing     Problem: PAIN - ADULT  Goal: Verbalizes/displays adequate comfort level or baseline comfort level  Description: Interventions:  - Encourage patient to monitor pain and request assistance  - Assess pain using appropriate pain scale  - Administer analgesics based on type and severity of pain and evaluate response  - Implement non-pharmacological measures as appropriate and evaluate response  - Consider cultural and social influences on pain and pain management  - Notify physician/advanced practitioner if interventions unsuccessful or patient reports new pain  Outcome: Progressing     Problem: INFECTION - ADULT  Goal: Absence or prevention of progression during hospitalization  Description: INTERVENTIONS:  - Assess and monitor for signs and symptoms of infection  - Monitor lab/diagnostic results  - Monitor all insertion sites, i e  indwelling lines, tubes, and drains  - Monitor endotracheal if appropriate and nasal secretions for changes in amount and color  - Sprakers appropriate cooling/warming therapies per order  - Administer medications as ordered  - Instruct and encourage patient and family to use good hand hygiene technique  - Identify and instruct in appropriate isolation precautions for identified infection/condition  Outcome: Progressing  Goal: Absence of fever/infection during neutropenic period  Description: INTERVENTIONS:  - Monitor WBC    Outcome: Progressing     Problem: SAFETY ADULT  Goal: Maintain or return to baseline ADL function  Description: INTERVENTIONS:  -  Assess patient's ability to carry out ADLs; assess patient's baseline for ADL function and identify physical deficits which impact ability to perform ADLs (bathing, care of mouth/teeth, toileting, grooming, dressing, etc )  - Assess/evaluate cause of self-care deficits   - Assess range of motion  - Assess patient's mobility; develop plan if impaired  - Assess patient's need for assistive devices and provide as appropriate  - Encourage maximum independence but intervene and supervise when necessary  - Involve family in performance of ADLs  - Assess for home care needs following discharge   - Consider OT consult to assist with ADL evaluation and planning for discharge  - Provide patient education as appropriate  Outcome: Progressing  Goal: Maintains/Returns to pre admission functional level  Description: INTERVENTIONS:  - Perform BMAT or MOVE assessment daily    - Set and communicate daily mobility goal to care team and patient/family/caregiver  - Collaborate with rehabilitation services on mobility goals if consulted  - Perform Range of Motion 3 times a day  - Reposition patient every 3 hours    - Dangle patient 3 times a day  - Stand patient 3 times a day  - Ambulate patient 3 times a day  - Out of bed to chair 3 times a day   - Out of bed for meals 3 times a day  - Out of bed for toileting  - Record patient progress and toleration of activity level   Outcome: Progressing  Goal: Patient will remain free of falls  Description: INTERVENTIONS:  - Educate patient/family on patient safety including physical limitations  - Instruct patient to call for assistance with activity   - Consult OT/PT to assist with strengthening/mobility   - Keep Call bell within reach  - Keep bed low and locked with side rails adjusted as appropriate  - Keep care items and personal belongings within reach  - Initiate and maintain comfort rounds  - Make Fall Risk Sign visible to staff  - Offer Toileting every  Hours, in advance of need  - Initiate/Maintain alarm  - Obtain necessary fall risk management equipment:   - Apply yellow socks and bracelet for high fall risk patients  - Consider moving patient to room near nurses station  Outcome: Progressing     Problem: DISCHARGE PLANNING  Goal: Discharge to home or other facility with appropriate resources  Description: INTERVENTIONS:  - Identify barriers to discharge w/patient and caregiver  - Arrange for needed discharge resources and transportation as appropriate  - Identify discharge learning needs (meds, wound care, etc )  - Arrange for interpretive services to assist at discharge as needed  - Refer to Case Management Department for coordinating discharge planning if the patient needs post-hospital services based on physician/advanced practitioner order or complex needs related to functional status, cognitive ability, or social support system  Outcome: Progressing     Problem: Knowledge Deficit  Goal: Patient/family/caregiver demonstrates understanding of disease process, treatment plan, medications, and discharge instructions  Description: Complete learning assessment and assess knowledge base    Interventions:  - Provide teaching at level of understanding  - Provide teaching via preferred learning methods  Outcome: Progressing     Problem: Prexisting or High Potential for Compromised Skin Integrity  Goal: Skin integrity is maintained or improved  Description: INTERVENTIONS:  - Identify patients at risk for skin breakdown  - Assess and monitor skin integrity  - Assess and monitor nutrition and hydration status  - Monitor labs   - Assess for incontinence   - Turn and reposition patient  - Assist with mobility/ambulation  - Relieve pressure over bony prominences  - Avoid friction and shearing  - Provide appropriate hygiene as needed including keeping skin clean and dry  - Evaluate need for skin moisturizer/barrier cream  - Collaborate with interdisciplinary team   - Patient/family teaching  - Consider wound care consult   Outcome: Progressing

## 2022-07-11 NOTE — QUICK NOTE
Kath is okay to resume home meds at rehab including hormonal replacement provided that risks are understood  There is an increased risk of DVT with estrogen use  Will continue on DVT prophylaxis for 28 days

## 2022-07-12 LAB
FLUAV RNA RESP QL NAA+PROBE: NEGATIVE
FLUBV RNA RESP QL NAA+PROBE: NEGATIVE
RSV RNA RESP QL NAA+PROBE: NEGATIVE
SARS-COV-2 RNA RESP QL NAA+PROBE: NEGATIVE

## 2022-07-12 PROCEDURE — 97535 SELF CARE MNGMENT TRAINING: CPT

## 2022-07-12 PROCEDURE — 91305 COVID-19 PFIZER VAC (TRIS SUCROSE, GRAY CAP FORM) 30 MCG/0.3ML SUSP: CPT

## 2022-07-12 PROCEDURE — 99024 POSTOP FOLLOW-UP VISIT: CPT

## 2022-07-12 PROCEDURE — 0241U HB NFCT DS VIR RESP RNA 4 TRGT: CPT

## 2022-07-12 PROCEDURE — 0051A: CPT

## 2022-07-12 RX ORDER — ESTRADIOL 2 MG/1
2 TABLET ORAL 4 TIMES DAILY
COMMUNITY

## 2022-07-12 RX ORDER — FINASTERIDE 5 MG/1
5 TABLET, FILM COATED ORAL DAILY
COMMUNITY

## 2022-07-12 RX ORDER — ESTRADIOL 1 MG/1
2 TABLET ORAL 4 TIMES DAILY
Status: DISCONTINUED | OUTPATIENT
Start: 2022-07-12 | End: 2022-07-13 | Stop reason: HOSPADM

## 2022-07-12 RX ORDER — BICALUTAMIDE 50 MG/1
50 TABLET, FILM COATED ORAL DAILY
Status: DISCONTINUED | OUTPATIENT
Start: 2022-07-12 | End: 2022-07-13 | Stop reason: HOSPADM

## 2022-07-12 RX ORDER — FINASTERIDE 5 MG/1
5 TABLET, FILM COATED ORAL DAILY
Status: DISCONTINUED | OUTPATIENT
Start: 2022-07-12 | End: 2022-07-13 | Stop reason: HOSPADM

## 2022-07-12 RX ORDER — BICALUTAMIDE 50 MG/1
50 TABLET, FILM COATED ORAL DAILY
COMMUNITY

## 2022-07-12 RX ORDER — ESTERIFIED ESTROGEN AND METHYLTESTOSTERONE 1.25; 2.5 MG/1; MG/1
1 TABLET ORAL DAILY
Status: DISCONTINUED | OUTPATIENT
Start: 2022-07-12 | End: 2022-07-12

## 2022-07-12 RX ADMIN — TRAMADOL HYDROCHLORIDE 50 MG: 50 TABLET, COATED ORAL at 20:36

## 2022-07-12 RX ADMIN — NICOTINE 1 PATCH: 14 PATCH, EXTENDED RELEASE TRANSDERMAL at 09:48

## 2022-07-12 RX ADMIN — ACETAMINOPHEN 975 MG: 325 TABLET, FILM COATED ORAL at 05:41

## 2022-07-12 RX ADMIN — ESTRADIOL 2 MG: 1 TABLET ORAL at 21:33

## 2022-07-12 RX ADMIN — FINASTERIDE 5 MG: 5 TABLET, FILM COATED ORAL at 12:06

## 2022-07-12 RX ADMIN — ESTRADIOL 2 MG: 1 TABLET ORAL at 12:04

## 2022-07-12 RX ADMIN — TRAMADOL HYDROCHLORIDE 50 MG: 50 TABLET, COATED ORAL at 05:41

## 2022-07-12 RX ADMIN — ENOXAPARIN SODIUM 30 MG: 30 INJECTION SUBCUTANEOUS at 20:36

## 2022-07-12 RX ADMIN — ESTRADIOL 2 MG: 1 TABLET ORAL at 17:26

## 2022-07-12 RX ADMIN — ACETAMINOPHEN 975 MG: 325 TABLET, FILM COATED ORAL at 14:14

## 2022-07-12 RX ADMIN — ACETAMINOPHEN 975 MG: 325 TABLET, FILM COATED ORAL at 21:33

## 2022-07-12 RX ADMIN — TRAZODONE HYDROCHLORIDE 50 MG: 50 TABLET ORAL at 21:33

## 2022-07-12 RX ADMIN — BNT162B2 0.3 ML: 0.23 INJECTION, SUSPENSION INTRAMUSCULAR at 18:21

## 2022-07-12 RX ADMIN — ENOXAPARIN SODIUM 30 MG: 30 INJECTION SUBCUTANEOUS at 09:47

## 2022-07-12 RX ADMIN — MULTIPLE VITAMINS W/ MINERALS TAB 1 TABLET: TAB ORAL at 09:48

## 2022-07-12 RX ADMIN — BICALUTAMIDE 50 MG: 50 TABLET, FILM COATED ORAL at 12:03

## 2022-07-12 NOTE — CASE MANAGEMENT
[facility] TCF Admission Coordinator had a telephone conversation with patient  Information on TCF services, length of stay, visitation, and unit's current Covid status provided  Patient states she is Covid vaccinated x3  TCF will need proof of vaccination  If patient's booster was administered greater than five mths ago, we do request that she receive the forth dose prior to dc from B

## 2022-07-12 NOTE — CASE MANAGEMENT
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 2022    Pre-operative examination 2022    Smoking 2022    Marijuana use 2022    DISHA (obstructive sleep apnea) 2022      LOS (days): 5  Geometric Mean LOS (GMLOS) (days):   Days to GMLOS:     OBJECTIVE:  Risk of Unplanned Readmission Score: 14 19         Current admission status: Inpatient   Preferred Pharmacy:   [facility] [address]  [address]  [address] [facility] [address]  Phone: [phone] Fax: [phone] [address] [facility]  Phone: [phone] Fax: [phone]    Primary Care Provider: No primary care provider on file  Primary Insurance: BLUE CROSS  Secondary Insurance:     DISCHARGE DETAILS:                                                                                                               Facility Insurance Auth Number: submitted SNF auth request to [facility] via [employer]  pending ref #[id number] for [address]  NPI: [id number]  Dr Andrew Weber NPI: [id number]  Clinicals attached via Availity

## 2022-07-12 NOTE — PROGRESS NOTES
Progress Note - Orthopedics   Kath Perez 40 y o  adult MRN: [id number]  Unit/Bed#: -01      Subjective:    40 y  o adult POD4 s/p L femoral neck ORIF  No acute events, no complaints  Pt doing well  Pain controlled   Denies fevers chills, CP, SOB    Labs:  0   Lab Value Date/Time    HCT 31 3 (L) 07/10/2022 0631    HCT 31 6 (L) 07/09/2022 0511    HCT 26 5 (L) 07/08/2022 1233    HGB 10 2 (L) 07/10/2022 0631    HGB 10 3 (L) 07/09/2022 0511    HGB 8 9 (L) 07/08/2022 1233    INR 0 97 07/07/2022 0444    WBC 9 95 07/10/2022 0631    WBC 10 39 (H) 07/09/2022 0511    WBC 9 14 07/08/2022 1233       Meds:    Current Facility-Administered Medications:     acetaminophen (TYLENOL) tablet 975 mg, 975 mg, Oral, Q8H [facility], Tai Cabral MD, 975 mg at 07/12/22 0541    aluminum-magnesium hydroxide-simethicone (MYLANTA) oral suspension 30 mL, 30 mL, Oral, Q6H PRN, Tai Cabral MD    calcium carbonate (TUMS) chewable tablet 1,000 mg, 1,000 mg, Oral, Daily PRN, Tai Cabral MD    docusate sodium (COLACE) capsule 100 mg, 100 mg, Oral, BID, Onur Long MD, 100 mg at 07/11/22 1758    enoxaparin (LOVENOX) subcutaneous injection 30 mg, 30 mg, Subcutaneous, Q12H [facility], Tai Cabral MD, 30 mg at 07/11/22 2238    multivitamin-minerals (CENTRUM) tablet 1 tablet, 1 tablet, Oral, Daily, Tai Cabral MD, 1 tablet at 07/11/22 0856    nicotine (NICODERM CQ) 14 mg/24hr TD 24 hr patch 1 patch, 1 patch, Transdermal, Daily, Tai Cabral MD, 1 patch at 07/11/22 0857    ondansetron (ZOFRAN) injection 4 mg, 4 mg, Intravenous, Q6H PRN, Tai Cabral MD    polyethylene glycol (MIRALAX) packet 17 g, 17 g, Oral, Daily, Tai Cabral MD, 17 g at 07/11/22 0858    senna (SENOKOT) tablet 8 6 mg, 1 tablet, Oral, Daily, Tai Cabral MD, 8 6 mg at 07/11/22 0859    simethicone (MYLICON) chewable tablet 80 mg, 80 mg, Oral, 4x Daily PRN, Tai Cabral MD    traMADol (ULTRAM) tablet 50 mg, 50 mg, Oral, Q8H PRN, Minnie Montgomery PA-C, 50 mg at 07/12/22 0541    traZODone (DESYREL) tablet 50 mg, 50 mg, Oral, HS, Samuel Banuelos MD, 50 mg at 07/11/22 7107    Blood Culture:   No results found for: BLOODCX    Wound Culture:   No results found for: WOUNDCULT    Ins and Outs:  I/O last 24 hours: In: 1140 [P O :1140]  Out: 2400 [Urine:2400]          Physical:  Vitals:    07/11/22 2205   BP: 122/75   Pulse: 96   Resp: 18   Temp:    SpO2: 95%     Musculoskeletal: left Lower Extremity  · Dressing c/d/i  · Skin intact, without erythema   · TTP thigh  · SILT s/s/sp/dp/t  +fhl/ehl, +ankle dorsi/plantar flexion  2+ DP pulse    Assessment:    40 y  o adult POD4 s/p L femoral neck ORIF  Doing well, pain well controlled        Plan:  · TTWB LLE  · Greater than 2 gram drop which qualifies for diagnosis of acute blood loss anemia, will monitor and administer IVF/prbc as indicated   · PT/OT  · Pain control  · DVT ppx  · Dispo: Ok for discharge from [facility] Lluvia Marsh PA-C

## 2022-07-12 NOTE — CASE MANAGEMENT
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 2022    Pre-operative examination 2022    Smoking 2022    Marijuana use 2022    DISHA (obstructive sleep apnea) 2022      LOS (days): 5  Geometric Mean LOS (GMLOS) (days):   Days to GMLOS:     OBJECTIVE:  Risk of Unplanned Readmission Score: 14 19         Current admission status: Inpatient   Preferred Pharmacy:   [address]  [address] [facility] [address]  [address] [facility] [address]  Phone: [phone] Fax: [phone] [address] [facility]  Phone: [phone] Fax: [phone]    Primary Care Provider: No primary care provider on file  Primary Insurance: BLUE CROSS  Secondary Insurance:     DISCHARGE DETAILS:    Discharge planning discussed with[de-identified] Patient     Other Referral/Resources/Interventions Provided:  Interventions: Short Term Rehab  Referral Comments: Per admissions representative Jameel Monday @ [facility], pt is accepted for admission tomorrow AM (after breakfast)  She may admit to a private room pending insurance approval   Discharge Support Staff Luz Wheatley) submitted authorization this afternoon -- Awaiting approval to arrange transportation  Provider informed pt will need a negative covid test and 4th booster vaccine prior to admission  Pt in agreement with this plan      Treatment Team Recommendation: Short Term Rehab  Discharge Destination Plan[de-identified] Short Term Rehab

## 2022-07-12 NOTE — PLAN OF CARE
Problem: OCCUPATIONAL THERAPY ADULT  Goal: Performs self-care activities at highest level of function for planned discharge setting  See evaluation for individualized goals  Description: Treatment Interventions: ADL retraining, Functional transfer training, Endurance training, Patient/family training, Equipment evaluation/education, Compensatory technique education, Activityengagement          See flowsheet documentation for full assessment, interventions and recommendations  Outcome: Progressing  Note: Limitation: Decreased ADL status, Decreased endurance, Decreased self-care trans, Decreased high-level ADLs  Prognosis: Good  Assessment: PT SEEN FOR OT TX SESSION WITH FOCUS ON FUNCTIONAL TXFS/MOBILITY, UB/LB ADLS, LT GROOMING TASK, TOILETING, OVERALL ACTIVITY TOLERANCE AND PT EDUCATION  IMPROVEMENT NOTED IN FUNCTIONAL SIT<->STAND TXFS AND FUNCTIONAL MOBILITY TO SUPERVISION LEVEL WITH G CARRY OVER OF LEARNED WBS AND USE OF RW  MIN CUE REQUIRED FOR PROPER HAND PLACEMENT DURING TXFS AND SAFE USE OF RW T/O  PT COMPLETE LT GROOMING TASK INCLUDING WASHING HANDS/FACE, SHAVING AND ORAL HYGIENE WHILE STANDING AT SINK FOR ~5 MINUTES AT SUPERVISION LEVEL PRIOR TO REQUIRING SEATED REST BREAK  AFTER SHORT REST BREAK, PT ABLE TO STAND FOR ANOTHER 5 MINUTES TO COMPLETE UB BATHING/DRESSING AT OVERALL SUPERVISION WITH ASSIST FOR FASTENERS  PT ABLE TO COMPLETE LB DRESSING INCLUDING DOFFING/DONNING B/L SOCKS AND DONNING HOSPITAL PANTS WITH MIN A/CGA FOR STEADYING DURING PULL UP OVER B/L HIPS  PT COMPLETED TOILETING ON STANDARD TOILET AT SUPERVISION LEVEL WITH USE OF GRAB BAR  PT HAS DEMONSTRATED SIGNIFICANT IMPROVEMENT HOWEVER REMAINS LIMITED  PT EXPRESSED CONCERNS FOR RETURNING HOME 2' 2ND FLOOR SET-UP AND NO AVAILABLE SUPPORT T/O THE DAY  ALL CURRENT QUESTIONS/CONCERNS REGARDING INPT REHAB AND RECOVERY PROGRESS ADDRESSED AT THIS TIME   FROM AN OT PERSPECTIVE, CURRENT RECOMMENDATION FOR INPT REHAB VS RETURN HOME WITH INCREASED FAMILY SUPPORT PENDING PT PROGRESS/AVAILABLE SUPPORT  WILL CONT TO FOLLOW TO ADDRESS THE INITIALLY ESTABLISHED OT GOALS       OT Discharge Recommendation: Post acute rehabilitation services  OT - OK to Discharge- Retiring Row: Yes

## 2022-07-12 NOTE — UTILIZATION REVIEW
Continued Stay Review    Date: 7/12                         Current Patient Class: Inpatient  Current Level of Care: Med Surg    HPI:46 y o  adult initially admitted on 7/7    Assessment/Plan:   POD4 s/p L femoral neck ORIF  TTWB LLE  Pain control    Per CM notes; Tentative for d/c tomorrow 7/13 to [facility] pending [employer]  Submitted for [employer]       Vital Signs:   07/12/22 08:22:55 98 2 °F (36 8 °C) 101 18 120/74 89 93 % --   07/11/22 22:05:16 -- 96 18 122/75 91 95 % --   07/11/22 20:30:32 99 4 °F (37 4 °C) 87 16 115/70 85 96 %      Pertinent Labs/Diagnostic Results:       Results from last 7 days   Lab Units 07/10/22  0631 07/09/22  0511 07/08/22  1233 07/07/22  0008   WBC Thousand/uL 9 95 10 39* 9 14 18 87*   HEMOGLOBIN g/dL 10 2* 10 3* 8 9* 12 4   HEMATOCRIT % 31 3* 31 6* 26 5* 38 2   PLATELETS Thousands/uL 241 243 200 284   NEUTROS ABS Thousands/µL 7 17 7 30 7 76* 15 05*         Results from last 7 days   Lab Units 07/10/22  0631 07/09/22  0511 07/08/22  1233 07/07/22  0008   SODIUM mmol/L 136 137 136 136   POTASSIUM mmol/L 3 9 3 7 4 3 3 8   CHLORIDE mmol/L 103 105 102 106   CO2 mmol/L 30 29 23 25   ANION GAP mmol/L 3* 3* 11 5   BUN mg/dL 6 6 7 17   CREATININE mg/dL 0 68 0 75 0 58* 0 93   CALCIUM mg/dL 8 6 8 0* 7 0* 8 8             Results from last 7 days   Lab Units 07/10/22  0631 07/09/22  0511 07/08/22  1233 07/07/22  0008   GLUCOSE RANDOM mg/dL 93 102 119 106         Results from last 7 days   Lab Units 07/07/22  0444   PROTIME seconds 12 5   INR  0 97   PTT seconds 28       Medications:   Scheduled Medications:  acetaminophen, 975 mg, Oral, Q8H JACQUES  bicalutamide, 50 mg, Oral, Daily  COVID-19 Pfizer vac (mercy sucrose, gray cap form), 0 3 mL, Intramuscular, Once  docusate sodium, 100 mg, Oral, BID  enoxaparin, 30 mg, Subcutaneous, Q12H JACQUES  estradiol, 2 mg, Oral, 4x Daily  finasteride, 5 mg, Oral, Daily  multivitamin-minerals, 1 tablet, Oral, Daily  nicotine, 1 patch, Transdermal, Daily  polyethylene glycol, 17 g, Oral, Daily  senna, 1 tablet, Oral, Daily  traZODone, 50 mg, Oral, HS      Continuous IV Infusions: None     PRN Meds:  aluminum-magnesium hydroxide-simethicone, 30 mL, Oral, Q6H PRN  calcium carbonate, 1,000 mg, Oral, Daily PRN  ondansetron, 4 mg, Intravenous, Q6H PRN  simethicone, 80 mg, Oral, 4x Daily PRN  traMADol, 50 mg, Oral, Q8H PRN 7/12 x1        Discharge Plan: See above    Network Utilization Review Department  ATTENTION: Please call with any questions or concerns to [phone] and carefully listen to the prompts so that you are directed to the right person  All voicemails are confidential   Hiram Garcia all requests for admission clinical reviews, approved or denied determinations and any other requests to dedicated fax number below belonging to the campus where the patient is receiving treatment   List of dedicated fax numbers for the Facilities:  [address] DENIALS (Administrative/Medical Necessity) [phone]   [address] (Maternity/NICU/Pediatrics) [phone]   [address] [place] [address] [address] [place] [address]  [address] [facility] [address] [address] [place] [address] [address] [facility] [facility] [phone]

## 2022-07-12 NOTE — OCCUPATIONAL THERAPY NOTE
Occupational Therapy Progress Note     Patient Name: Vanessa Reasons  Today's Date: 7/12/2022  Problem List  Principal Problem:    Pre-operative examination  Active Problems:    Closed displaced fracture of left femoral neck [facility])    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    Smoking    Marijuana use    DISHA (obstructive sleep apnea)        07/12/22 1334   OT Last Visit   OT Visit Date 07/12/22   Note Type   Note Type Treatment for insurance authorization   Restrictions/Precautions   Weight Bearing Precautions Per Order Yes   RLE Weight Bearing Per Order WBAT   LLE Weight Bearing Per Order TTWB   Other Precautions WBS; Fall Risk;Pain   Pain Assessment   Pain Assessment Tool 0-10   Pain Score 6  ("6/10 BUT I HAVE A HIGH TOLERANCE  MY 6 IS ANOTHER PERSON'S 10")   Pain Location/Orientation Orientation: Left; Location: Hip   Hospital Pain Intervention(s) Repositioned; Ambulation/increased activity; Emotional support   ADL   Where Assessed Standing at sink   Grooming Assistance 5  Supervision/Setup   Grooming Deficit Increased time to complete;Wash/dry hands; Wash/dry face; Teeth care; Shaving;Setup;Supervision/safety   UB Bathing Assistance 5  Supervision/Setup   UB Bathing Deficit Setup; Increased time to complete;Supervision/safety   UB Dressing Assistance 5  Supervision/Setup   UB Dressing Deficit Fasteners   LB Dressing Assistance 4  Minimal Assistance   LB Dressing Deficit Setup;Steadying   [address]  5  Supervision/Setup   Transfers   Sit to [address]   Additional items Increased time required   Stand to [address]   Additional items Increased time required   Functional Mobility   Functional Mobility 5  Supervision   Additional items Rolling walker   Toilet Transfers   Toilet Transfer From Rolling walker   Toilet Transfer Type To and from   Toilet Transfer to Standard toilet  (+ GRAB BAR)   Toilet Transfer Technique Ambulating   Toilet Transfers Supervision   Cognition   Overall Cognitive Status [facility] Arousal/Participation Alert; Cooperative   Attention Within functional limits   Orientation Level Oriented X4   Memory Within functional limits   Following Commands Follows all commands and directions without difficulty   Activity Tolerance   Activity Tolerance Patient tolerated treatment well;Patient limited by pain   Medical Staff Made Aware APPROPRIATE TO SEE   Assessment   Assessment PT SEEN FOR OT TX SESSION WITH FOCUS ON FUNCTIONAL TXFS/MOBILITY, UB/LB ADLS, LT GROOMING TASK, TOILETING, OVERALL ACTIVITY TOLERANCE AND PT EDUCATION  IMPROVEMENT NOTED IN FUNCTIONAL SIT<->STAND TXFS AND FUNCTIONAL MOBILITY TO SUPERVISION LEVEL WITH G CARRY OVER OF LEARNED WBS AND USE OF RW  MIN CUE REQUIRED FOR PROPER HAND PLACEMENT DURING TXFS AND SAFE USE OF RW T/O  PT COMPLETE LT GROOMING TASK INCLUDING WASHING HANDS/FACE, SHAVING AND ORAL HYGIENE WHILE STANDING AT SINK FOR ~5 MINUTES AT SUPERVISION LEVEL PRIOR TO REQUIRING SEATED REST BREAK  AFTER SHORT REST BREAK, PT ABLE TO STAND FOR ANOTHER 5 MINUTES TO COMPLETE UB BATHING/DRESSING AT OVERALL SUPERVISION WITH ASSIST FOR FASTENERS  PT ABLE TO COMPLETE LB DRESSING INCLUDING DOFFING/DONNING B/L SOCKS AND DONNING HOSPITAL PANTS WITH MIN A/CGA FOR STEADYING DURING PULL UP OVER B/L HIPS  PT COMPLETED TOILETING ON STANDARD TOILET AT SUPERVISION LEVEL WITH USE OF GRAB BAR  PT HAS DEMONSTRATED SIGNIFICANT IMPROVEMENT HOWEVER REMAINS LIMITED  PT EXPRESSED CONCERNS FOR RETURNING HOME 2' 2ND FLOOR SET-UP AND NO AVAILABLE SUPPORT T/O THE DAY  ALL CURRENT QUESTIONS/CONCERNS REGARDING INPT REHAB AND RECOVERY PROGRESS ADDRESSED AT THIS TIME  FROM AN OT PERSPECTIVE, CURRENT RECOMMENDATION FOR INPT REHAB VS RETURN HOME WITH INCREASED FAMILY SUPPORT PENDING PT PROGRESS/AVAILABLE SUPPORT  WILL CONT TO FOLLOW TO ADDRESS THE INITIALLY ESTABLISHED OT GOALS  Plan   Treatment Interventions ADL retraining;Functional transfer training; Endurance training;Patient/family training;Equipment evaluation/education; Compensatory technique education; Energy conservation; Activityengagement   Goal Expiration Date 07/22/22   OT Treatment Day 2   OT Frequency 3-5x/wk   Recommendation   OT Discharge Recommendation Post acute rehabilitation services   AM-PAC Daily Activity Inpatient   Lower Body Dressing 3   Bathing 3   Toileting 3   Upper Body Dressing 3   Grooming 4   Eating 4   Daily Activity Raw Score 20   Daily Activity Standardized Score (Calc for Raw Score >=11) 42 03   AM-PAC Applied Cognition Inpatient   Following a Speech/Presentation 4   Understanding Ordinary Conversation 4   Taking Medications 4   Remembering Where Things Are Placed or Put Away 4   Remembering List of 4-5 Errands 4   Taking Care of Complicated Tasks 4   Applied Cognition Raw Score 24   Applied Cognition Standardized Score 62 21   Modified [place] Scale   Modified Joya Scale 3       Documentation completed by POLO Rosen, OTR/L  [address] [place] [address] Certified ID# [id number]

## 2022-07-12 NOTE — PROGRESS NOTES
Spiritual Care Progress Note    2022  Patient: Kath Perez : 1975  Admission Date & Time: 2022 2148  MRN: [id number] [facility]: [id number]       followed-up with patient from previous visits, provided emotional support, explored patient's hopes, and read poetry together  Patient described feeling supported by her community and friends and noted hopefulness for the future  Patient thanked  for the visit  Spiritual care will remain available               Chaplaincy Interventions Utilized:   Exploration: Explored hope and Explored emotional needs & resources    Relationship Building: Cultivated a relationship of care and support and Listened empathically    Ritual: Provided poetry      Chaplaincy Outcomes Achieved:  Emotional resources utilized, Expressed intermediate hope, and Relational resources utilized      Spiritual Coping Strategies Utilized:   Spiritual community     22 1500   Clinical Encounter Type   Visited With Patient   Routine Visit Follow-up

## 2022-07-13 VITALS
HEART RATE: 94 BPM | HEIGHT: 71 IN | BODY MASS INDEX: 24.42 KG/M2 | OXYGEN SATURATION: 96 % | TEMPERATURE: 98.6 F | RESPIRATION RATE: 17 BRPM | DIASTOLIC BLOOD PRESSURE: 83 MMHG | SYSTOLIC BLOOD PRESSURE: 133 MMHG | WEIGHT: 174.4 LBS

## 2022-07-13 PROCEDURE — NC001 PR NO CHARGE: Performed by: ORTHOPAEDIC SURGERY

## 2022-07-13 RX ORDER — ENOXAPARIN SODIUM 100 MG/ML
30 INJECTION SUBCUTANEOUS EVERY 12 HOURS SCHEDULED
Qty: 18 ML | Refills: 0 | Status: ON HOLD
Start: 2022-07-13 | End: 2022-07-18 | Stop reason: SDUPTHER

## 2022-07-13 RX ORDER — TRAMADOL HYDROCHLORIDE 50 MG/1
50 TABLET ORAL EVERY 8 HOURS PRN
Qty: 30 TABLET | Refills: 0 | Status: SHIPPED | OUTPATIENT
Start: 2022-07-13

## 2022-07-13 RX ADMIN — BICALUTAMIDE 50 MG: 50 TABLET, FILM COATED ORAL at 08:58

## 2022-07-13 RX ADMIN — ENOXAPARIN SODIUM 30 MG: 30 INJECTION SUBCUTANEOUS at 08:56

## 2022-07-13 RX ADMIN — TRAMADOL HYDROCHLORIDE 50 MG: 50 TABLET, COATED ORAL at 13:27

## 2022-07-13 RX ADMIN — ESTRADIOL 2 MG: 1 TABLET ORAL at 08:58

## 2022-07-13 RX ADMIN — FINASTERIDE 5 MG: 5 TABLET, FILM COATED ORAL at 08:57

## 2022-07-13 RX ADMIN — ACETAMINOPHEN 975 MG: 325 TABLET, FILM COATED ORAL at 13:26

## 2022-07-13 RX ADMIN — TRAMADOL HYDROCHLORIDE 50 MG: 50 TABLET, COATED ORAL at 05:39

## 2022-07-13 RX ADMIN — ESTRADIOL 2 MG: 1 TABLET ORAL at 12:04

## 2022-07-13 RX ADMIN — MULTIPLE VITAMINS W/ MINERALS TAB 1 TABLET: TAB ORAL at 08:57

## 2022-07-13 RX ADMIN — NICOTINE 1 PATCH: 14 PATCH, EXTENDED RELEASE TRANSDERMAL at 08:56

## 2022-07-13 RX ADMIN — ACETAMINOPHEN 975 MG: 325 TABLET, FILM COATED ORAL at 05:39

## 2022-07-13 NOTE — DISCHARGE SUMMARY
Discharge Summary - Orthopedics   Kath Perez 40 y o  adult MRN: [id number]  Unit/Bed#: -01    Attending Physician: Jose Gates    Admitting diagnosis: Closed fracture of neck of left femur, initial encounter ([facility] ) Rashida Clemons  Major injury of hip [S79 659A]    Discharge diagnosis: Closed fracture of neck of left femur, initial encounter ([place] [address] ) [S72 002A]  Major injury of hip [S79 919A]    Date of admission: 7/6/2022    Date of discharge: 07/13/22    Procedure: Left Femoral neck ORIF- synthes DHS     HPI & Hospital course:  40 y o  adult who presented to the ED after a fall off a skateboard sustaining a femoral neck fracture  Pt was admitted to the orthopaedic service and taken to the OR on 7/8/22 for Left femoral neck ORIF with synthes DHS  Prior to surgery the risks and benefits of surgery were explained and informed consent was obtained  Surgery went without complications and pt was discharged to the PACU in a stable condition and was transferred to the floor  There was some difficult with rehab placement due to insurance and hormonal medications  On discharge date pt was cleared by PT and the medicine team and determined to be safe for discharge  Daily discussion was had with the patient, nursing staff, orthopaedic team, and family members if present  All questions were answered to the patients satisifaction  0   Lab Value Date/Time    HGB 10 2 (L) 07/10/2022 0631    HGB 10 3 (L) 07/09/2022 0511    HGB 8 9 (L) 07/08/2022 1233    HGB 12 4 07/07/2022 0008       Greater than 2 gram decrease in Hb qualifies for diagnosis of acute blood loss anemia  Vital signs remained stable and pt was resuscitated with IVF as needed  Discharge Instructions:   · Toe touch weight bearing LLE  · Keep dressings clean and dry at all times   · Complete DVT prophylaxis as prescribed   · Physical therapy  · Follow-up as scheduled, otherwise call for appt  Discharge Medications:   For the complete list of discharge medications, please refer to the patient's medication reconciliation

## 2022-07-13 NOTE — PLAN OF CARE
Problem: MOBILITY - ADULT  Goal: Maintain or return to baseline ADL function  Description: INTERVENTIONS:  -  Assess patient's ability to carry out ADLs; assess patient's baseline for ADL function and identify physical deficits which impact ability to perform ADLs (bathing, care of mouth/teeth, toileting, grooming, dressing, etc )  - Assess/evaluate cause of self-care deficits   - Assess range of motion  - Assess patient's mobility; develop plan if impaired  - Assess patient's need for assistive devices and provide as appropriate  - Encourage maximum independence but intervene and supervise when necessary  - Involve family in performance of ADLs  - Assess for home care needs following discharge   - Consider OT consult to assist with ADL evaluation and planning for discharge  - Provide patient education as appropriate  Outcome: Progressing  Goal: Maintains/Returns to pre admission functional level  Description: INTERVENTIONS:  - Perform BMAT or MOVE assessment daily    - Set and communicate daily mobility goal to care team and patient/family/caregiver  - Collaborate with rehabilitation services on mobility goals if consulted  - Perform Range of Motion  times a day  - Reposition patient every  hours    - Dangle patient  times a day  - Stand patient  times a day  - Ambulate patient  times a day  - Out of bed to chair  times a day   - Out of bed for meals  times a day  - Out of bed for toileting  - Record patient progress and toleration of activity level   Outcome: Progressing     Problem: Potential for Falls  Goal: Patient will remain free of falls  Description: INTERVENTIONS:  - Educate patient/family on patient safety including physical limitations  - Instruct patient to call for assistance with activity   - Consult OT/PT to assist with strengthening/mobility   - Keep Call bell within reach  - Keep bed low and locked with side rails adjusted as appropriate  - Keep care items and personal belongings within reach  - Initiate and maintain comfort rounds  - Make Fall Risk Sign visible to staff  - Offer Toileting every  Hours, in advance of need  - Initiate/Maintain alarm  - Obtain necessary fall risk management equipment:   - Apply yellow socks and bracelet for high fall risk patients  - Consider moving patient to room near nurses station  Outcome: Progressing     Problem: PAIN - ADULT  Goal: Verbalizes/displays adequate comfort level or baseline comfort level  Description: Interventions:  - Encourage patient to monitor pain and request assistance  - Assess pain using appropriate pain scale  - Administer analgesics based on type and severity of pain and evaluate response  - Implement non-pharmacological measures as appropriate and evaluate response  - Consider cultural and social influences on pain and pain management  - Notify physician/advanced practitioner if interventions unsuccessful or patient reports new pain  Outcome: Progressing     Problem: INFECTION - ADULT  Goal: Absence or prevention of progression during hospitalization  Description: INTERVENTIONS:  - Assess and monitor for signs and symptoms of infection  - Monitor lab/diagnostic results  - Monitor all insertion sites, i e  indwelling lines, tubes, and drains  - Monitor endotracheal if appropriate and nasal secretions for changes in amount and color  - [place] appropriate cooling/warming therapies per order  - Administer medications as ordered  - Instruct and encourage patient and family to use good hand hygiene technique  - Identify and instruct in appropriate isolation precautions for identified infection/condition  Outcome: Progressing  Goal: Absence of fever/infection during neutropenic period  Description: INTERVENTIONS:  - Monitor WBC    Outcome: Progressing     Problem: SAFETY ADULT  Goal: Maintain or return to baseline ADL function  Description: INTERVENTIONS:  -  Assess patient's ability to carry out ADLs; assess patient's baseline for ADL function and identify physical deficits which impact ability to perform ADLs (bathing, care of mouth/teeth, toileting, grooming, dressing, etc )  - Assess/evaluate cause of self-care deficits   - Assess range of motion  - Assess patient's mobility; develop plan if impaired  - Assess patient's need for assistive devices and provide as appropriate  - Encourage maximum independence but intervene and supervise when necessary  - Involve family in performance of ADLs  - Assess for home care needs following discharge   - Consider OT consult to assist with ADL evaluation and planning for discharge  - Provide patient education as appropriate  Outcome: Progressing  Goal: Maintains/Returns to pre admission functional level  Description: INTERVENTIONS:  - Perform BMAT or MOVE assessment daily    - Set and communicate daily mobility goal to care team and patient/family/caregiver  - Collaborate with rehabilitation services on mobility goals if consulted  - Perform Range of Motion  times a day  - Reposition patient every  hours    - Dangle patient  times a day  - Stand patient  times a day  - Ambulate patient  times a day  - Out of bed to chair  times a day   - Out of bed for meals  times a day  - Out of bed for toileting  - Record patient progress and toleration of activity level   Outcome: Progressing  Goal: Patient will remain free of falls  Description: INTERVENTIONS:  - Educate patient/family on patient safety including physical limitations  - Instruct patient to call for assistance with activity   - Consult OT/PT to assist with strengthening/mobility   - Keep Call bell within reach  - Keep bed low and locked with side rails adjusted as appropriate  - Keep care items and personal belongings within reach  - Initiate and maintain comfort rounds  - Make Fall Risk Sign visible to staff  - Offer Toileting every  Hours, in advance of need  - Initiate/Maintain alarm  - Obtain necessary fall risk management equipment:  - Apply yellow socks and bracelet for high fall risk patients  - Consider moving patient to room near nurses station  Outcome: Progressing     Problem: DISCHARGE PLANNING  Goal: Discharge to home or other facility with appropriate resources  Description: INTERVENTIONS:  - Identify barriers to discharge w/patient and caregiver  - Arrange for needed discharge resources and transportation as appropriate  - Identify discharge learning needs (meds, wound care, etc )  - Arrange for interpretive services to assist at discharge as needed  - Refer to Case Management Department for coordinating discharge planning if the patient needs post-hospital services based on physician/advanced practitioner order or complex needs related to functional status, cognitive ability, or social support system  Outcome: Progressing     Problem: Knowledge Deficit  Goal: Patient/family/caregiver demonstrates understanding of disease process, treatment plan, medications, and discharge instructions  Description: Complete learning assessment and assess knowledge base    Interventions:  - Provide teaching at level of understanding  - Provide teaching via preferred learning methods  Outcome: Progressing     Problem: Prexisting or High Potential for Compromised Skin Integrity  Goal: Skin integrity is maintained or improved  Description: INTERVENTIONS:  - Identify patients at risk for skin breakdown  - Assess and monitor skin integrity  - Assess and monitor nutrition and hydration status  - Monitor labs   - Assess for incontinence   - Turn and reposition patient  - Assist with mobility/ambulation  - Relieve pressure over bony prominences  - Avoid friction and shearing  - Provide appropriate hygiene as needed including keeping skin clean and dry  - Evaluate need for skin moisturizer/barrier cream  - Collaborate with interdisciplinary team   - Patient/family teaching  - Consider wound care consult   Outcome: Progressing

## 2022-07-13 NOTE — PROGRESS NOTES
Spiritual Care Progress Note    2022  Patient: Kath Perez : 1975  Admission Date & Time: 2022 2148  MRN: [id number] [facility]: [id number]     followed-up with patient, provided empathetic listening and emotional support, and discussed patient's hopes regarding d/c  Patient thanked  for regular visits  Spiritual care signing off at this point   If further needs arise please contact [employer] via [address] [place] [address]      22 1000   Clinical Encounter Type   Visited With Patient   Routine Visit Follow-up

## 2022-07-13 NOTE — DISCHARGE SUMMARY
ORTHOPEDICS DISCHARGE SUMMARY  Kath Perez 40 y o  adult MRN: [id number]  Unit/Bed#: -99    Attending Physician: Shabnam Carrion    Admitting diagnosis: Closed fracture of neck of left femur, initial encounter ([facility] ) Rashida Clemons  Major injury of hip [S79 659A]    Discharge diagnosis: Closed fracture of neck of left femur, initial encounter ([place] [address] ) [S72 002A]  Major injury of hip [S79 189A]    Date of admission: 7/6/2022    Date of discharge: 07/13/22         Procedure: Left Total Shoulder Arthroplasty    HPI:  This is a 40y o  year old adult that presented to the office with signs and symptoms of left shoulder osteoarthritis and/or other pathology  They tried and failed conservative treatment measures and wished to proceed with surgical intervention  The risks, benefits, and complications of the procedure were discussed with the patient and informed consent was obtained  Hospital Course: The patient was admitted to the hospital on 7/6/2022 and underwent an uncomplicated left total shoulder arthroplasty  They were transferred to the floor after a brief stay in the post-anesthesia care unit  Their pain was well managed with IV and oral pain medications  On discharge date pt was cleared by PT and the medicine team and determined to be safe for discharge    0   Lab Value Date/Time    HGB 10 2 (L) 07/10/2022 0631    HGB 10 3 (L) 07/09/2022 0511    HGB 8 9 (L) 07/08/2022 1233    HGB 12 4 07/07/2022 0008       Greater than 2 gram drop which qualifies for diagnosis of acute blood loss anemia  Vital signs remained stable and pt was resuscitated with IVF as needed       Discharge Instructions: The patient was discharged nonweight bearing to the left upper extremity  Refrain from PT/OT until cleared by surgeon  Take pain medications as instructed  Discharge Medications: For the complete list of discharge medications, please refer to the patient's medication reconciliation

## 2022-07-13 NOTE — CASE MANAGEMENT
Case Management Discharge Planning Note    Patient name Consuelo Catherine  Location /-01 MRN [id number]  : 1975 Date 2022       Current Admission Date: 2022  Current Admission Diagnosis:Pre-operative examination   Patient Active Problem List    Diagnosis Date Noted    Closed displaced fracture of left femoral neck ([address] ) 2022    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 2022    Pre-operative examination 2022    Smoking 2022    Marijuana use 2022    DISHA (obstructive sleep apnea) 2022      LOS (days): 6  Geometric Mean LOS (GMLOS) (days):   Days to GMLOS:     OBJECTIVE:  Risk of Unplanned Readmission Score: 13 08         Current admission status: Inpatient   Preferred Pharmacy:   [facility]  [address] [facility] [address]  Phone: [phone] Fax: [address] , [address] [facility]  Phone: [phone] Fax: [phone]    Primary Care Provider: No primary care provider on file  Primary Insurance: BLUE CROSS  Secondary Insurance:     DISCHARGE DETAILS:    Discharge planning discussed with[de-identified] Patient at bedside      Other Referral/Resources/Interventions Provided:  Interventions: Short Term Rehab  Referral Comments: Authorization has been received for pt's admission to [facility], per D/C Support Staff  Pt may admit today after breakfast, per admissions representative Angeles Mancini  Pt was boosted yesterday afternoon with [employer], and also tested negative for covid  Covid vaccine card updated and provided to [facility] for their documentation  PASSR also completed and uploaded  Pt is in agreement with transfer today  Short-term disability paperwork emailed to [facility] team member Renay Burleson for completion  Phone number for Allison Goodman ([phone]) also given to pt for follow up      Treatment Team Recommendation: Short Term Rehab  Discharge Destination Plan[de-identified] Short Term Rehab     Transported by (Company and Unit #): [employer]      Additional Comments: Orders can be sent electronically  [facility] pharmacy, [address], can be selected as pharmacy of choice in the navigator for this patient; please indicate on escript that [facility] is the accepting facility  If orders are not escribed, they will need paper scripts for all narcotics and controlled substances      Accepting Facility Name, [facility] [address] : [address]  Receiving Facility/Agency Phone Number: [phone]  Facility/Agency Fax Number: [phone]

## 2022-07-13 NOTE — PROGRESS NOTES
Progress Note - Orthopedics   Kath Perez 40 y o  adult MRN: [id number]  Unit/Bed#: -01      Subjective:    40 y  o adult POD 5 s/p L femoral neck closed reduction internal fixation  No acute events, no complaints  Pt doing well  Pain controlled  Denies fevers chills, CP, SOB   Shes looking forward to being discharged today    Labs:  0   Lab Value Date/Time    HCT 31 3 (L) 07/10/2022 0631    HCT 31 6 (L) 07/09/2022 0511    HCT 26 5 (L) 07/08/2022 1233    HGB 10 2 (L) 07/10/2022 0631    HGB 10 3 (L) 07/09/2022 0511    HGB 8 9 (L) 07/08/2022 1233    INR 0 97 07/07/2022 0444    WBC 9 95 07/10/2022 0631    WBC 10 39 (H) 07/09/2022 0511    WBC 9 14 07/08/2022 1233       Meds:    Current Facility-Administered Medications:     acetaminophen (TYLENOL) tablet 975 mg, 975 mg, Oral, Q8H [facility], Onur Long MD, 975 mg at 07/13/22 0539    aluminum-magnesium hydroxide-simethicone (MYLANTA) oral suspension 30 mL, 30 mL, Oral, Q6H PRN, Abdullahi Vera MD    bicalutamide (CASODEX) tablet 50 mg, 50 mg, Oral, Daily, Susana Ace MD, 50 mg at 07/12/22 1203    calcium carbonate (TUMS) chewable tablet 1,000 mg, 1,000 mg, Oral, Daily PRN, Abdullahi Vera MD    docusate sodium (COLACE) capsule 100 mg, 100 mg, Oral, BID, Onur Long MD, 100 mg at 07/11/22 1758    enoxaparin (LOVENOX) subcutaneous injection 30 mg, 30 mg, Subcutaneous, Q12H [facility], Abdullahi Vera MD, 30 mg at 07/12/22 2036    estradiol (ESTRACE) tablet 2 mg, 2 mg, Oral, 4x Daily, Susana Ace MD, 2 mg at 07/12/22 2133    finasteride (PROSCAR) tablet 5 mg, 5 mg, Oral, Daily, Susana Ace MD, 5 mg at 07/12/22 1206    multivitamin-minerals (CENTRUM) tablet 1 tablet, 1 tablet, Oral, Daily, Abdullahi Vera MD, 1 tablet at 07/12/22 0948    nicotine (NICODERM CQ) 14 mg/24hr TD 24 hr patch 1 patch, 1 patch, Transdermal, Daily, Abdullahi Vera MD, 1 patch at 07/12/22 0948    ondansetron (ZOFRAN) injection 4 mg, 4 mg, Intravenous, Q6H PRN, Tawanda Mcknight MD    polyethylene glycol (MIRALAX) packet 17 g, 17 g, Oral, Daily, Tawanda Mcknight MD, 17 g at 07/11/22 0858    senna (SENOKOT) tablet 8 6 mg, 1 tablet, Oral, Daily, Elsie Long MD, 8 6 mg at 07/11/22 0859    simethicone (MYLICON) chewable tablet 80 mg, 80 mg, Oral, 4x Daily PRN, Tawanda Mcknight MD    traMADol Artist Bruce) tablet 50 mg, 50 mg, Oral, Q8H PRN, Daniel Melendez PA-C, 50 mg at 07/13/22 0539    traZODone (DESYREL) tablet 50 mg, 50 mg, Oral, HS, Tawanda Mcknight MD, 50 mg at 07/12/22 2133    Blood Culture:   No results found for: BLOODCX    Wound Culture:   No results found for: WOUNDCULT    Ins and Outs:  I/O last 24 hours: In: 600 [P O :600]  Out: 1400 [Urine:1400]          Physical:  Vitals:    07/13/22 0005   BP: 108/61   Pulse: 77   Resp:    Temp: 98 2 °F (36 8 °C)   SpO2: 93%     Musculoskeletal: left Lower Extremity  · Dressing c/d/i  · No erythema   · Appropriately tender  · SILT s/s/sp/dp/t  +fhl/ehl, +ankle dorsi/plantar flexion  2+ DP pulse    Assessment:    40 y  o adult POD5 s/p L femoral neck closed reduction internal fixation  Doing well, pain well controlled        Plan:  · TTWB LLE  · Greater than 2 gram drop which qualifies for diagnosis of acute blood loss anemia, will monitor and administer IVF/prbc as indicated   · PT/OT  · Pain control  · Continue DVT ppx  · Dispo: Ok for discharge from ortho perspective    Jaye Bal MD

## 2022-07-13 NOTE — CASE MANAGEMENT
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  Phone: [phone] Fax: [phone]    Primary Care Provider: No primary care provider on file  Primary Insurance: BLUE CROSS  Secondary Insurance:     DISCHARGE DETAILS:                                                                                                               Facility Insurance Auth Number: approved [facility] auth # [id number] for SOC:   NRD:   Care Coord: Gege Loza  P: [phone]   F: [phone]

## 2022-07-14 ENCOUNTER — TELEPHONE (OUTPATIENT)
Dept: OBGYN CLINIC | Facility: HOSPITAL | Age: 47
End: 2022-07-14

## 2022-07-14 ENCOUNTER — NURSING HOME VISIT (OUTPATIENT)
Dept: GERIATRICS | Facility: OTHER | Age: 47
End: 2022-07-14
Payer: COMMERCIAL

## 2022-07-14 DIAGNOSIS — F43.10 PTSD (POST-TRAUMATIC STRESS DISORDER): ICD-10-CM

## 2022-07-14 DIAGNOSIS — F17.200 SMOKING: ICD-10-CM

## 2022-07-14 DIAGNOSIS — S72.002A CLOSED DISPLACED FRACTURE OF LEFT FEMORAL NECK (HCC): Primary | ICD-10-CM

## 2022-07-14 DIAGNOSIS — G47.00 INSOMNIA, UNSPECIFIED TYPE: ICD-10-CM

## 2022-07-14 DIAGNOSIS — Z78.9 MALE-TO-FEMALE TRANSGENDER PERSON: ICD-10-CM

## 2022-07-14 DIAGNOSIS — R26.2 AMBULATORY DYSFUNCTION: ICD-10-CM

## 2022-07-14 PROCEDURE — 99305 1ST NF CARE MODERATE MDM 35: CPT | Performed by: FAMILY MEDICINE

## 2022-07-14 NOTE — ASSESSMENT & PLAN NOTE
Patient has history of stroking  Continue to  smoking cessation  Provide nicotine patches  Educate patient on risk factors associated with smoking  Continue to monitor vitals   Supplement O2 if <92%   Encourage incentive spirometry

## 2022-07-14 NOTE — PROGRESS NOTES
[facility]    History and Physical  POS: 32    Records Reviewed include: Hospital records      Chief Complaint/ Reason for Admission:   Left femur fracture with ORIF   History of Present Illness:     Ms Destin Vann, 39 y/o white female with history of transition from male-to-female, presented to [facility] after falling off her skateboard  She was brought to [facility] after surgery for PT and rehabilitation  Denies any pain at this time  She is just fatigued from a long week  She has back stiffness but denies any shooting pain, numbness, tingling, or weakness in her extremities  She has left leg weakness  Denies any numbness or tingling in her left leg  She says the surgical site is warm to touch to but denies any itching or fevers  Denies fevers, night sweats, chills, SOB, chest pain  She says she has pain in her left leg with PT but she is satisfied with her pain management  She did not hit her head when she fell  She braced herself with her left forearm/elbow  Denies history of heart attack, stroke, thyroid disease, kidney disease, liver disease, GERD  She lives at home with roommates but she manages her own ADLs and medications  She uses the bus for transportation  Allergies    Allergies   Allergen Reactions    Penicillins Rash and Fever    Sulfa Antibiotics Rash and Fever       Past Medical History  History reviewed  No pertinent past medical history  Past Surgical History:   Procedure Laterality Date    ORIF HIP FRACTURE Left 7/7/2022    Procedure: OPEN REDUCTION W/ INTERNAL FIXATION (ORIF) LEFT FEMORAL NECK FRACTURE;  Surgeon: Zach Vilchis MD;  Location: [facility];  Service: Orthopedics       Family History  History reviewed  No pertinent family history      Social History  Social History     Tobacco Use   Smoking Status Current Every Day Smoker    Types: Cigarettes   Smokeless Tobacco Never Used      Social History     Substance and Sexual Activity   Alcohol Use Not Currently      Social History     Substance and Sexual Activity   Drug Use Never        Lives: Apartment,  Social Support: roommates   Fall in the past 12 months: none besides fall of skateboard   Use of assistance Device: None    Physical Exam    Vital Signs  /72 145 8 lb  Temperature: 97 7F RR 19  HR 61  SpO2 93%      Physical Exam  Vitals reviewed  Constitutional:       General: She is awake  She is not in acute distress  Appearance: Normal appearance  She is well-groomed and normal weight  She is not ill-appearing, toxic-appearing or diaphoretic  Comments: Patient was asleep in bed when I arrived  No signs of acute distress or malnutrition  Patient was tired throughout visit  HENT:      Head: Normocephalic and atraumatic  Right Ear: Hearing normal       Left Ear: Hearing normal       Nose: Nose normal       Mouth/Throat:      Lips: Pink  Mouth: Mucous membranes are moist       Dentition: Normal dentition  Tongue: No lesions  Tongue does not deviate from midline  Palate: No mass and lesions  Pharynx: Oropharynx is clear  Uvula midline  Eyes:      General: Lids are normal  Vision grossly intact  Gaze aligned appropriately  Right eye: No discharge or hordeolum  Left eye: No discharge or hordeolum  Extraocular Movements: Extraocular movements intact  Neck:      Thyroid: No thyroid mass, thyromegaly or thyroid tenderness  Trachea: Trachea and phonation normal    Cardiovascular:      Rate and Rhythm: Normal rate and regular rhythm  Chest Wall: PMI is not displaced  No thrill  Pulmonary:      Effort: Pulmonary effort is normal       Breath sounds: Normal breath sounds and air entry  Chest:      Chest wall: No mass  Comments: Breasts present   Abdominal:      General: Abdomen is flat  Bowel sounds are normal  There is no distension  There are no signs of injury  Palpations: Abdomen is soft  Tenderness:  There is no abdominal tenderness  There is no guarding  Genitourinary:     Comments: Male parts present   Musculoskeletal:      Cervical back: Normal range of motion  No signs of trauma  No pain with movement  Comments: Strength 5/5 bilaterally on UE    strength 5/5 bilaterally   Strength 5/5 on RLE  Strength 3/5 on LLE  Tenderness upon palpation of left lateral thigh  Full ROM and strength bilaterally in feet   Patient was using on ice on left thigh   Incision site has no signs of infection: no drainage, erythema  Skin:     General: Skin is warm and dry  Capillary Refill: Capillary refill takes less than 2 seconds  Coloration: Skin is not ashen or jaundiced  Findings: Abrasion present  Nails: There is no clubbing  Comments: Surgical incision on left lateral thigh   Scab on left elbow  No signs of infection at this time    Neurological:      General: No focal deficit present  Mental Status: She is alert, oriented to person, place, and time and easily aroused  Mental status is at baseline  Cranial Nerves: Cranial nerves are intact  Sensory: Sensation is intact  Motor: Weakness present  Gait: Gait abnormal       Comments: Ambulating with walker    Psychiatric:         Attention and Perception: Attention and perception normal          Mood and Affect: Mood and affect normal          Speech: Speech normal          Behavior: Behavior normal  Behavior is cooperative  Thought Content: Thought content normal          Cognition and Memory: Cognition and memory normal          Judgment: Judgment normal          Review of Systems:  Review of Systems   Constitutional: Positive for activity change and fatigue  Negative for appetite change, chills, diaphoresis and fever  Patient cannot ambulate at baseline due to fracture   Patient is very tired today  Claims it's from her hectic week  HENT: Negative for rhinorrhea, sore throat and trouble swallowing      Eyes: Negative for photophobia, pain and visual disturbance  Respiratory: Negative for cough, chest tightness and shortness of breath  Cardiovascular: Negative for chest pain  Gastrointestinal: Negative for abdominal pain, constipation and diarrhea  Genitourinary: Negative for difficulty urinating and dysuria  Musculoskeletal: Positive for arthralgias, back pain, gait problem and myalgias  Patient has left leg pain and weakness with movement  Back stiffness x 1 day   Patient denies any discharge or itching of surgical incision site    Neurological: Positive for weakness  Negative for dizziness, syncope, light-headedness, numbness and headaches  Patient has left leg weakness   Psychiatric/Behavioral: Positive for sleep disturbance  Patient was up at 4am but slept well; overall fatigued       List of Current Medications:    Medication reviewed  All orders signed  Complete list is in the paper chart  Allergies    Allergies   Allergen Reactions    Penicillins Rash and Fever    Sulfa Antibiotics Rash and Fever       Labs/Diagnostics (reviewed by this provider): I personally reviewed lab results and imaging studies  Full reports are in the paper chart  Assessment/Plan:    Closed displaced fracture of left femoral neck ([address] )  Patient does not have any pain this time  No signs of infection  Continue tramadol 50mg every 8 hour as needed  Continue trazodone 50mg at bed  Start tylenol schedule 975mg oral TID  Monitor surgical wound for signs of infection  Continue Lovenox for DVT prophylaxis  Continue ice PRN to left lower extremity  Continue to work with PT/OT  Out of bed as tolerated  Encourage patient to ambulate early and often   Monitor for pain management  Continue to monitor vitals  F/u with orthopedics at discharge   Monitor CBC, CMP trends  Follow-up with orthopedic surgery    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  Patient will continue at-home meds     Progesterone 200mg oral 1x daily  Continue estradiol 2mg 4x daily   Continue bicalutamide 50mg daily   Continue finasteride 5mg daily   F/u with PCP at discharge     Insomnia  Patient has history of insomnia   Continue trazadone at bedtime  Continue to encourage sleep hygiene   Monitor for sleep disturbances  Avoid daytime napping     Smoking  Patient has history of stroking  Continue to  smoking cessation  Provide nicotine patches  Educate patient on risk factors associated with smoking  Continue to monitor vitals  Supplement O2 if <92%   Encourage incentive spirometry    Ambulatory dysfunction  Ambulates independently at baseline  Goal is to discharge without assistance  Continue PT/OT  Continue out of bed as tolerated  Continue pain management with tramadol, ice, tylenol   Monitor CBC, CMP trends  Continue Lovenox for DVT prophylaxis       PTSD (post-traumatic stress disorder)  Patient has history of PTSD  Continue to offer support   Monitor patient closely  F/u with psych as needed        Pain: 0  Rehab Potential:Excellent  Patient Informed of Medical Condition: yes  Patient is Capable of Understanding Their Right: yes  Discharge Plan: home without assistance  At -home PT   F/u ortho   Vaccination:   Immunization History   Administered Date(s) Administered    COVID-19 Pfizer vac (Mikhail-sucrose, gray cap) 12 yr+ IM 07/12/2022    Tdap 07/06/2022     Advanced Directives: no  Code status:Full Code      Elpidio Joiner MD  Geriatric Medicine  7/14/20221:00 PM

## 2022-07-14 NOTE — ASSESSMENT & PLAN NOTE
Patient does not have any pain this time  No signs of infection  Continue tramadol 50mg every 8 hour as needed  Continue trazodone 50mg at bed  Start tylenol schedule 975mg oral TID  Monitor surgical wound for signs of infection  Continue Lovenox for DVT prophylaxis  Continue ice PRN to left lower extremity     Continue to work with PT/OT  Out of bed as tolerated  Encourage patient to ambulate early and often   Monitor for pain management  Continue to monitor vitals  F/u with orthopedics at discharge   Monitor CBC, CMP trends  Follow-up with orthopedic surgery

## 2022-07-14 NOTE — ASSESSMENT & PLAN NOTE
Patient has history of insomnia   Continue trazadone at bedtime  Continue to encourage sleep hygiene   Monitor for sleep disturbances  Avoid daytime napping

## 2022-07-14 NOTE — ASSESSMENT & PLAN NOTE
Patient will continue at-home meds     Progesterone 200mg oral 1x daily  Continue estradiol 2mg 4x daily   Continue bicalutamide 50mg daily   Continue finasteride 5mg daily   F/u with PCP at discharge

## 2022-07-14 NOTE — ASSESSMENT & PLAN NOTE
Patient has history of PTSD     Continue to offer support   Monitor patient closely  F/u with psych as needed

## 2022-07-14 NOTE — TELEPHONE ENCOUNTER
Hello,  Please advise if the following patient can be forced onto the schedule:    Patient: Kath Perez    : [birth year]    [facility]    Call back #: 31 Pam RAMOS current admission    Insurance: [employer]    Reason for appointment:PO L femoral neck ORIF     Requested doctor/location: Wendy Banuelos      Thank you

## 2022-07-14 NOTE — ASSESSMENT & PLAN NOTE
Ambulates independently at baseline  Goal is to discharge without assistance  Continue PT/OT  Continue out of bed as tolerated  Continue pain management with tramadol, ice, tylenol   Monitor CBC, CMP trends  Continue Lovenox for DVT prophylaxis

## 2022-07-18 ENCOUNTER — NURSING HOME VISIT (OUTPATIENT)
Dept: GERIATRICS | Facility: OTHER | Age: 47
End: 2022-07-18
Payer: COMMERCIAL

## 2022-07-18 DIAGNOSIS — Z78.9 MALE-TO-FEMALE TRANSGENDER PERSON: ICD-10-CM

## 2022-07-18 DIAGNOSIS — G47.00 INSOMNIA, UNSPECIFIED TYPE: ICD-10-CM

## 2022-07-18 DIAGNOSIS — S72.002A CLOSED FRACTURE OF NECK OF LEFT FEMUR, INITIAL ENCOUNTER (HCC): Primary | ICD-10-CM

## 2022-07-18 DIAGNOSIS — F17.200 SMOKING: ICD-10-CM

## 2022-07-18 DIAGNOSIS — R26.2 AMBULATORY DYSFUNCTION: ICD-10-CM

## 2022-07-18 PROCEDURE — 99316 NF DSCHRG MGMT 30 MIN+: CPT | Performed by: FAMILY MEDICINE

## 2022-07-18 RX ORDER — TRAZODONE HYDROCHLORIDE 50 MG/1
50 TABLET ORAL
Qty: 30 TABLET | Refills: 0 | Status: SHIPPED | OUTPATIENT
Start: 2022-07-18

## 2022-07-18 RX ORDER — ENOXAPARIN SODIUM 100 MG/ML
30 INJECTION SUBCUTANEOUS EVERY 12 HOURS SCHEDULED
Qty: 18 ML | Refills: 0 | Status: SHIPPED | OUTPATIENT
Start: 2022-07-18 | End: 2022-08-03

## 2022-07-18 NOTE — ASSESSMENT & PLAN NOTE
Patient denies any pain or associated symptoms at this time  Patient will ambulate with crutches  Patient will work from home and will not be driving  Patient is receiving help with all IADLs from friend  Patient will continue Lovenox  Patient will continue tramadol for pain management  Patient will be given 24 tablets   Patient will continue Tylenol and ice for pain management  Patient will continue at home physical therapy  Patient will follow-up with orthopedics  Continue to monitor surgical incision for infection  Patient was encouraged to ambulate as tolerated  Continue p o   Hydration and adequate nutrition  Patient was encouraged to avoid fuzzy and slippery socks

## 2022-07-18 NOTE — PROGRESS NOTES
[address] [place] [address] Drive: [facility]  POS: 31: SNF/Short Term Rehab    NAME: Kath Perez  AGE: 40 y o  DATE OF ADMISSION: 7/13/22   DATE OF DISCHARGE:7/18/22   DISCHARGE DISPOSITION: discharge home with crutches   Today's Visit: 7/18/20229:16 PM    Reason for admission: Patient was admitted from [facility] for rehabilitation after hospitalization for left femur neck fracture with surgical ORIF  Course of stay: Patient was admitted to  [address] [place] [address] for rehabilitation due to  physical deconditioning and pain management  Significant events during the stay include none  The patient participated in PT/OT  Assessment/Plan:    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  Patient will continue at-home meds:  Continue estradiol, bicalutamide, finasteride, casodex  Patient has hormone therapy at home  Does not need prescription at this time  Patient will follow up with PCP    Insomnia  Continue trazodone at bedtime  Prescription will be given  Patient denies any sleep disturbances at this time  Patient will follow-up with sleep specialist and PCP  Avoid daytime napping, avoid nighttime awakening  Educate patient on sleep hygiene    Closed displaced fracture of left femoral neck ([address] )  Patient denies any pain or associated symptoms at this time  Patient will ambulate with crutches  Patient will work from home and will not be driving  Patient is receiving help with all IADLs from friend  Patient will continue Lovenox  Patient will continue tramadol for pain management  Patient will be given 24 tablets   Patient will continue Tylenol and ice for pain management  Patient will continue at home physical therapy  Patient will follow-up with orthopedics  Continue to monitor surgical incision for infection  Patient was encouraged to ambulate as tolerated  Continue p o   Hydration and adequate nutrition  Patient was encouraged to avoid fuzzy and slippery socks     Ambulatory dysfunction  Continue PT/OT at home- script provided  Educated about fall preacutions    Smoking  Counseled cessation, continue nicotine patches          Discharge Medications: See discharge medication list which was reviewed and signed  Status at time of discharge: Stable    Subjective:  Patient denies any pain at this time  Patient denies any weakness, tingling, numbness in upper or lower extremities bilaterally  Patient is satisfied with pain management  Patient is satisfied with PT progress  Patient will receive a Lyft ride home to apartment where her friend will help her settle in  Patient will be using crutches to ambulate  Patient completed 26 stairs at PT last week  Patient is confident ability to ambulate  Patient denies any trouble, pain, blood with urination  Patient denies any constipation or diarrhea  Patient is eating and sleeping well  Patient will be working from during this time  Patient has a friend who will be helping with cooking and groceries  Patient will not be driving during this time  Patient will follow-up with orthopedics and PCP  Patient denies any cardiac symptoms at this time such as shortness of breath, chest pain  Patient denies any infectious symptoms such as fever, chills, night sweats  Patient denies any itching, discharge, erythema at surgical site  Review of Systems   Constitutional: Negative for activity change, appetite change, chills, diaphoresis, fatigue and fever  HENT: Negative for congestion, rhinorrhea and sore throat  Eyes: Negative for pain and visual disturbance  Respiratory: Negative for cough, chest tightness and shortness of breath  Cardiovascular: Negative for chest pain  Gastrointestinal: Negative for abdominal pain, constipation and diarrhea  Endocrine: Negative for polyuria  Genitourinary: Negative for difficulty urinating, dysuria and hematuria     Musculoskeletal: Positive for gait problem  Negative for arthralgias, back pain and myalgias  Patient ambulates with crutches   Skin: Positive for wound  Patient denies any symptoms of infection at surgical site   Neurological: Negative for dizziness, syncope, weakness, light-headedness, numbness and headaches  Psychiatric/Behavioral: Negative for behavioral problems, confusion and sleep disturbance  Vital Signs:   Blood pressure 133/59 temperature 98 1° heart rate 71 respiration 19 weight 170 lb SpO2 98% room air       Exam:     Physical Exam  Vitals reviewed  Constitutional:       General: She is not in acute distress  Appearance: Normal appearance  She is normal weight  She is not ill-appearing, toxic-appearing or diaphoretic  Comments: Patient was sitting upright in bed when I arrive talking to nursing  She was in a great med  No signs of respiratory distress or malnutrition  dressed appropriately for season   HENT:      Head: Normocephalic and atraumatic  Ears:      Comments: Hearing normal bilaterally     Nose: Nose normal       Mouth/Throat:      Mouth: Mucous membranes are moist    Eyes:      General:         Right eye: No discharge  Left eye: No discharge  Extraocular Movements: Extraocular movements intact  Cardiovascular:      Rate and Rhythm: Normal rate and regular rhythm  Heart sounds: No murmur heard  Pulmonary:      Effort: Pulmonary effort is normal       Breath sounds: Normal breath sounds  Abdominal:      General: Abdomen is flat  Palpations: Abdomen is soft  There is no mass  Tenderness: There is no abdominal tenderness  There is no guarding  Musculoskeletal:         General: Signs of injury present  No tenderness  Normal range of motion  Cervical back: Normal range of motion  Right lower leg: No edema  Left lower leg: No edema        Comments: Strength 5/5 bilaterally on upper and lower extremities   strength 5/5 bilaterally  Full sensation present bilaterally in lower extremities   Skin:     General: Skin is warm  Findings: No bruising  Comments: No signs of infection at surgical site   Neurological:      Mental Status: She is alert  Psychiatric:         Mood and Affect: Mood normal          Behavior: Behavior normal          Thought Content: Thought content normal          Judgment: Judgment normal       Comments: Patient is excited to go home and feels confident in her ability         Discussion with patient/family and further instructions:  -Fall precautions  -Aspiration precautions  -Bleeding precautions  -Monitor for signs/symptoms of infection  -Medication list was reviewed and signed  -DME form was completed    Follow-up Recommendations: Please follow-up with your primary care physician within 7-10 days of discharge to review medication changes and current status  Problem List Follow-up Recommendations:  Patient will follow-up with orthopedics  Social work will schedule appointment  Patient will follow-up with PCP  Patient will schedule this appointment        Pramod Gill MD  Geriatric Medicine  2/52/98947:96 PM

## 2022-07-18 NOTE — ASSESSMENT & PLAN NOTE
Continue trazodone at bedtime  Prescription will be given  Patient denies any sleep disturbances at this time    Patient will follow-up with sleep specialist and PCP  Avoid daytime napping, avoid nighttime awakening  Educate patient on sleep hygiene

## 2022-07-18 NOTE — ASSESSMENT & PLAN NOTE
Patient will continue at-home meds:  Continue estradiol, bicalutamide, finasteride, casodex  Patient has hormone therapy at home  Does not need prescription at this time    Patient will follow up with PCP

## 2022-07-23 DIAGNOSIS — M25.552 LEFT HIP PAIN: Primary | ICD-10-CM

## 2022-07-25 ENCOUNTER — EVALUATION (OUTPATIENT)
Dept: PHYSICAL THERAPY | Facility: REHABILITATION | Age: 47
End: 2022-07-25
Payer: COMMERCIAL

## 2022-07-25 DIAGNOSIS — M25.552 ACUTE HIP PAIN, LEFT: Primary | ICD-10-CM

## 2022-07-25 DIAGNOSIS — Z98.890 HISTORY OF OPEN REDUCTION AND INTERNAL FIXATION (ORIF) PROCEDURE: ICD-10-CM

## 2022-07-25 PROCEDURE — 97161 PT EVAL LOW COMPLEX 20 MIN: CPT | Performed by: PHYSICAL THERAPIST

## 2022-07-25 PROCEDURE — 97112 NEUROMUSCULAR REEDUCATION: CPT | Performed by: PHYSICAL THERAPIST

## 2022-07-25 NOTE — PROGRESS NOTES
PT Evaluation     Today's date: 2022  Patient name: Kath Perez  : 1975  MRN: [id number]  Referring provider: No ref  provider found  Dx:   Encounter Diagnosis     ICD-10-CM    1  Acute hip pain, left  M25 552    2  History of open reduction and internal fixation (ORIF) procedure  Z98 890                   Assessment  Assessment details: Alex Gutierres is a pleasant 40 y o  adult who presents with acute left hip pain secondary to a left hip ORIF surgical procedure secondary to a fall skateboarding which resulted in a fracture  At this time, she has primary movement impairment of decreased left hip strength, along with secondary impairments of decreased range of motion, weight bearing intolerance, poor gait and balance, and decreased functional movement capacity, resulting in pathoanatomical symptoms consistent with referring diagnosis  Her main concerns at this time are 2worry over not knowing what's wrong, fear of not being able to keep active and future ill health (and wanting to prevent it)  No further referral appears necessary at this time based upon examination results  I expect she will improve with physical therapy in 8-12 weeks   Positive prognostic indicators include positive attitude toward recovery, good understanding of diagnosis and treatment plan options and acuity of symptoms  Negative prognostic indicators include history of smoking            Problem List:  1) decreased left hip strength  2) poor gait   3) weight bearing intolerance    Comparable signs:  1) prolonged ambulation  2) prolonged sitting  Impairments: abnormal coordination, abnormal gait, abnormal muscle firing, abnormal muscle tone, abnormal or restricted ROM, abnormal movement, activity intolerance, impaired balance, impaired physical strength, lacks appropriate home exercise program, pain with function, weight-bearing intolerance, poor posture  and poor body mechanics    Symptom irritability: moderateUnderstanding of Dx/Px/POC: good   Prognosis: good    Goals  Impairment Based Goals:   Patient will improve FOTO score greater than predicted increase in 4 weeks  Patient will improve strength by at least 1/2 grade in 4 weeks  Patient will have decrease in pain by at least 50% in 4 weeks  Patient will restore normalized gait pattern by discharge  Functional Based Goals: By Discharge  Patient will be independent with home exercise program    Patient will be able to manage symptoms independently  Plan  Patient would benefit from: skilled speech therapy  Referral necessary: No  Planned therapy interventions: abdominal trunk stabilization, activity modification, balance, balance/weight bearing training, body mechanics training, breathing training, coordination, fine motor coordination training, flexibility, functional ROM exercises, gait training, graded activity, graded exercise, graded motor, home exercise program, therapeutic activities, therapeutic exercise, stretching, strengthening, self care, patient education, postural training, neuromuscular re-education, massage, joint mobilization and manual therapy  Frequency: 2x week  Plan of Care beginning date: 2022  Plan of Care expiration date: 10/17/2022  Treatment plan discussed with: patient        Subjective Evaluation    History of Present Illness  Date of surgery: 2022  Mechanism of injury: surgery  Mechanism of injury: Was skateboarding on the  and fell down  Admitted that night  The next night at the hospital I had the surgery  I now am toe touch weight bearing as tolerated, and trying to move around the best that I can  Sometimes I do get an increase in pain if I move in certain directions  If I have not been moving the hip for a long time, I get some discomfort             Not a recurrent problem   Pain  Current pain ratin  At best pain ratin  At worst pain ratin  Quality: dull ache, discomfort, tight and throbbing  Relieving factors: medications, relaxation, rest and change in position  Aggravating factors: stair climbing, standing, walking and sitting    Social Support    Employment status: not working ([employer] employee - do this part time and looking to get back to it)  Treatments  Previous treatment: physical therapy and medication (I had some physical therapy at [address] [facility] for about a week)  Current treatment: medication and physical therapy  Patient Goals  Patient goals for therapy: decreased edema, decreased pain, improved balance, increased motion, increased strength, independence with ADLs/IADLs, return to sport/leisure activities and return to work          Objective     General Comments:      Hip Comments   Gait: TTWB with axillary crutches     Quad Set: Good    Hip PROM:   Flexion: 25% limited with reported tightness   Abduction: 25% limited  ER at 90: 50% limited   IR at 90: 50% limited     Hip AROM:   Flexion: 25% limited with reported tightness   Abduction: 25% limited  ER at 90: 50% limited   IR at 90: 50% limited     Strength:   Hip Flexion: 3/5 p!    Knee Extension: 4/5   Knee Flexion: 4+/5     TU seconds with axillary crutches     Red Flags: Unremarkable                  Precautions: Left hip ORIF; currently taking estrogen, transgender, TTWB precaution current,       Manuals 7/            L hip PROM SE 8'                                                    Neuro Re-Ed             Quad Set 10x            LAQ 8x 3" ecc            Bridge 2x10            Half Tandem Balance             Full Tandem Balance             Standing Hip Flexion              Standing Hip Abduction             Ther Ex             NuStep             Heel Slide 1'x2            Heel Raises - Standing             Total Gym             Sidelying Hip Abduction             Modified Side Plank             Lunge Progressions             Pallof Press             Ther Activity                                       Gait Training Alter G                          Modalities

## 2022-07-26 ENCOUNTER — TELEPHONE (OUTPATIENT)
Dept: OBGYN CLINIC | Facility: HOSPITAL | Age: 47
End: 2022-07-26

## 2022-07-26 ENCOUNTER — APPOINTMENT (OUTPATIENT)
Dept: RADIOLOGY | Facility: CLINIC | Age: 47
End: 2022-07-26
Payer: COMMERCIAL

## 2022-07-26 ENCOUNTER — OFFICE VISIT (OUTPATIENT)
Dept: OBGYN CLINIC | Facility: CLINIC | Age: 47
End: 2022-07-26

## 2022-07-26 VITALS
SYSTOLIC BLOOD PRESSURE: 112 MMHG | HEART RATE: 73 BPM | BODY MASS INDEX: 24.36 KG/M2 | HEIGHT: 71 IN | WEIGHT: 174 LBS | DIASTOLIC BLOOD PRESSURE: 68 MMHG

## 2022-07-26 DIAGNOSIS — Z98.890 S/P MUSCULOSKELETAL SYSTEM SURGERY: Primary | ICD-10-CM

## 2022-07-26 DIAGNOSIS — M25.552 LEFT HIP PAIN: ICD-10-CM

## 2022-07-26 PROCEDURE — 99024 POSTOP FOLLOW-UP VISIT: CPT | Performed by: PHYSICIAN ASSISTANT

## 2022-07-26 PROCEDURE — 73502 X-RAY EXAM HIP UNI 2-3 VIEWS: CPT

## 2022-07-26 NOTE — PROGRESS NOTES
Orthopaedics Office Visit - Post-op Patient Visit    ASSESSMENT/PLAN:    Assessment:   19 days s/p left femoral neck fracture ORIF, DOS 7/7/22  Progressing well overall     Plan:   · X-rays were performed in the office and reviewed   · Continue toe touch WB   · Staples were removed today   · May shower normally, avoid soaking the incision and vigorous scrubbingAvoid standing water for a total of 6 weeks post op   · May perform scar massage once incision is healed with Vitamin E oil   · Continue PT   · Advised to avoid crossing legs as this will put stress over the incision site  · Continue Lovenox for DVT prophylaxis   · Follow up in 3 weeks time with repeat left hip x-rays      To Do Next Visit:  X-ray left hip, progress WB status     _____________________________________________________  CHIEF COMPLAINT:  Chief Complaint   Patient presents with    Left Hip - Post-op         SUBJECTIVE:  Donna Aj is a 40 y o  adult who presents to the office 19 days s/p left femoral neck fracture ORIF, DOS 7/7/22  Overall Kath is doing well  She has been toe touch WB with the use of crutches  Her initial therapy evaluation was yesterday  She will be attending PT 2x a week  She has been taking Lovenox for DVT prophylaxis  She is taking Tramadol and Tylenol for pain control        SOCIAL HISTORY:  Social History     Tobacco Use    Smoking status: Current Every Day Smoker     Types: Cigarettes    Smokeless tobacco: Never Used   Vaping Use    Vaping Use: Never used   Substance Use Topics    Alcohol use: Not Currently    Drug use: Never       MEDICATIONS:    Current Outpatient Medications:     bicalutamide (CASODEX) 50 mg tablet, Take 50 mg by mouth daily, Disp: , Rfl:     enoxaparin (LOVENOX) 30 mg/0 3 mL, Inject 0 3 mL (30 mg total) under the skin every 12 (twelve) hours for 16 days, Disp: 18 mL, Rfl: 0    estradiol (ESTRACE) 2 MG tablet, Take 2 mg by mouth 4 (four) times a day, Disp: , Rfl:     finasteride (PROSCAR) 5 mg tablet, Take 5 mg by mouth daily, Disp: , Rfl:     Multiple Vitamins-Minerals (multivitamin with minerals) tablet, Take 1 tablet by mouth daily, Disp: , Rfl:     progesterone 200 mg vaginal suppository, Insert 1 suppository (200 mg total) into the vagina in the morning, Disp: 30 suppository, Rfl: 0    traMADol (Ultram) 50 mg tablet, Take 1 tablet (50 mg total) by mouth every 8 (eight) hours as needed for moderate pain, Disp: 30 tablet, Rfl: 0    traZODone (DESYREL) 50 mg tablet, Take 1 tablet (50 mg total) by mouth daily at bedtime, Disp: 30 tablet, Rfl: 0    REVIEW OF SYSTEMS:  MSK: as noted in HPi  Neuro: WNL  Pertinent items are otherwise noted in HPI  A comprehensive review of systems was otherwise negative     _____________________________________________________  PHYSICAL EXAMINATION:  Vital signs: /68   Pulse 73   Ht 5' 11" (1 803 m)   Wt 78 9 kg (174 lb)   BMI 24 27 kg/m²   General: No acute distress, awake and alert  Psychiatric: Mood and affect appear appropriate  HEENT: Trachea Midline, No torticollis, no apparent facial trauma  Cardiovascular: No audible murmurs; Extremities appear perfused  Pulmonary: No audible wheezing or stridor  Skin: No open lesions; see further details (if any) below    MUSCULOSKELETAL EXAMINATION:    Extremities: Left hip   No erythema, ecchymosis or edema  Incision is clean and dry with staples intact   Extremity appears warm and well perfused     _____________________________________________________  STUDIES REVIEWED:  I personally reviewed the images and interpretation is as follows:  X-rays of the left hip demonstrate healing femoral neck fracture with orthopedic hardware intact  PROCEDURES PERFORMED:  Suture removal    Date/Time: 7/26/2022 2:40 PM  Performed by: Kvng Juárez PA-C  Authorized by: Kvng Juárez PA-C   Universal Protocol:  Consent: Verbal consent obtained    Risks and benefits: risks, benefits and alternatives were discussed  Consent given by: patient  Patient understanding: patient states understanding of the procedure being performed  Site marked: the operative site was marked  Patient identity confirmed: verbally with patient        Patient location:  Bedside  Location:     Laterality:  Left    Location:  Lower extremity    Lower extremity location:  Hip    Hip location:  L hip  Procedure details: Tools used: Other (comment) (staple remover)    Wound appearance:  Good wound healing, clean and no sign(s) of infection  Post-procedure details:     Post-removal:  Steri-Strips applied    Patient tolerance of procedure:   Tolerated well, no immediate complications

## 2022-07-26 NOTE — TELEPHONE ENCOUNTER
Patient called to cancel today's 1st PO, she thought it was going to be VV  [place] is too far for her, would like something in [place]  Please return call asap

## 2022-07-27 ENCOUNTER — OFFICE VISIT (OUTPATIENT)
Dept: PHYSICAL THERAPY | Facility: REHABILITATION | Age: 47
End: 2022-07-27
Payer: COMMERCIAL

## 2022-07-27 DIAGNOSIS — Z98.890 HISTORY OF OPEN REDUCTION AND INTERNAL FIXATION (ORIF) PROCEDURE: ICD-10-CM

## 2022-07-27 DIAGNOSIS — M25.552 ACUTE HIP PAIN, LEFT: Primary | ICD-10-CM

## 2022-07-27 PROCEDURE — 97110 THERAPEUTIC EXERCISES: CPT | Performed by: PHYSICAL THERAPIST

## 2022-07-27 PROCEDURE — 97140 MANUAL THERAPY 1/> REGIONS: CPT | Performed by: PHYSICAL THERAPIST

## 2022-07-27 PROCEDURE — 97112 NEUROMUSCULAR REEDUCATION: CPT | Performed by: PHYSICAL THERAPIST

## 2022-07-27 NOTE — PROGRESS NOTES
Daily Note     Today's date: 2022  Patient name: Kath Perez  : 1975  MRN: [id number]  Referring provider: Snow Adams MD  Dx:   Encounter Diagnosis     ICD-10-CM    1  Acute hip pain, left  M25 552    2  History of open reduction and internal fixation (ORIF) procedure  Z98 890                   Subjective: Physician instructed me to continue with toe touch weight bearing right now  The appointment went well  Going to have X-Rays done again in 3 weeks  Objective: See treatment diary below      Assessment: Tolerated treatment well  Able to perform active straight leg raise with independence today  PROM progressing nicely  Continuing to be TTWB currently with crutches  Patient would benefit from continued PT  Plan: Continue per plan of care        Precautions: Left hip ORIF; currently taking estrogen, transgender, TTWB precaution current,       Manuals            L hip PROM SE 8'  SE 8'                                                   Neuro Re-Ed             Quad Set 10x 10x            LAQ 8x 3" ecc 3x8 3" ecc           Bridge 2x10 3x10           Half Tandem Balance             Full Tandem Balance             Standing Hip Flexion              Standing Hip Abduction             Ther Ex             NuStep  10'            Heel Slide 1'x2 1'x2           Active Straight Leg Raise  2x6           Total Gym  3'           Sidelying Hip Abduction             Right Sidelying Clamshell  3x8 3" eccentric           Lunge Progressions             Pallof Press             Ther Activity                                       Gait Training             Kishor G                          Modalities

## 2022-08-01 ENCOUNTER — OFFICE VISIT (OUTPATIENT)
Dept: PHYSICAL THERAPY | Facility: REHABILITATION | Age: 47
End: 2022-08-01
Payer: COMMERCIAL

## 2022-08-01 DIAGNOSIS — M25.552 ACUTE HIP PAIN, LEFT: Primary | ICD-10-CM

## 2022-08-01 DIAGNOSIS — Z98.890 HISTORY OF OPEN REDUCTION AND INTERNAL FIXATION (ORIF) PROCEDURE: ICD-10-CM

## 2022-08-01 PROCEDURE — 97110 THERAPEUTIC EXERCISES: CPT | Performed by: PHYSICAL THERAPIST

## 2022-08-01 PROCEDURE — 97140 MANUAL THERAPY 1/> REGIONS: CPT | Performed by: PHYSICAL THERAPIST

## 2022-08-01 NOTE — PROGRESS NOTES
Daily Note     Today's date: 2022  Patient name: Kath Perez  : 1975  MRN: [id number]  Referring provider: Danis Lewis MD  Dx:   Encounter Diagnosis     ICD-10-CM    1  Acute hip pain, left  M25 552    2  History of open reduction and internal fixation (ORIF) procedure  Z98 890                   Subjective: pt reports that her hip his very sore today, but that she is progressing and slowly putting more weight on her affected leg  Objective: See treatment diary below      Assessment: Tolerated treatment well  Patient demonstrated fatigue post treatment, exhibited good technique with therapeutic exercises and would benefit from continued PT  Abbreviated session because pt arrived 15 minutes late  Pt reported and demonstrated L hip tightness with PROM which improved with intervention and exercise  Continue to progress as tolerated by pain  1:1 with GIACOMO Salomon under the direct supervision of Sal Rasmussen DPT for entirety of tx  Plan: Continue per plan of care        Precautions: Left hip ORIF; currently taking estrogen, transgender, TTWB precaution current,       Manuals           L hip PROM SE 8'  SE 8' SE 8 '                                                 Neuro Re-Ed             Quad Set 10x 10x  NV          LAQ 8x 3" ecc 3x8 3" ecc NV          Bridge 2x10 3x10 NV          Half Tandem Balance             Full Tandem Balance             Standing Hip Flexion              Standing Hip Abduction             Ther Ex             NuStep  10'  10' Lv 5          Heel Slide 1'x2 1'x2 1'x2          Active Straight Leg Raise  2x6 x10          Total Gym  3' 3'          Sidelying Hip Abduction             Right Sidelying Clamshell  3x8 3" eccentric 3x8 3" eccentric          Lunge Progressions             Pallof Press             Ther Activity                                       Gait Training             Alter G                          Modalities

## 2022-08-03 ENCOUNTER — OFFICE VISIT (OUTPATIENT)
Dept: PHYSICAL THERAPY | Facility: REHABILITATION | Age: 47
End: 2022-08-03
Payer: COMMERCIAL

## 2022-08-03 DIAGNOSIS — M25.552 ACUTE HIP PAIN, LEFT: Primary | ICD-10-CM

## 2022-08-03 DIAGNOSIS — Z98.890 HISTORY OF OPEN REDUCTION AND INTERNAL FIXATION (ORIF) PROCEDURE: ICD-10-CM

## 2022-08-03 PROCEDURE — 97140 MANUAL THERAPY 1/> REGIONS: CPT

## 2022-08-03 PROCEDURE — 97112 NEUROMUSCULAR REEDUCATION: CPT

## 2022-08-03 PROCEDURE — 97110 THERAPEUTIC EXERCISES: CPT

## 2022-08-03 NOTE — PROGRESS NOTES
Daily Note     Today's date: 8/3/2022  Patient name: Kath Perez  : 1975  MRN: [id number]  Referring provider: Snow Adams MD  Dx:   Encounter Diagnosis     ICD-10-CM    1  Acute hip pain, left  M25 552    2  History of open reduction and internal fixation (ORIF) procedure  Z98 890                   Subjective: Pt reports some improvement upon arrival      Objective: See treatment diary below      Assessment: Tolerated treatment well  Patient would benefit from continued PT   Pt  able to complete all exercises with no increase in pain during or after session  Pt able to progress exercises this session without complaint  Pt  1:1 with PTA for entirety  Plan: Continue per plan of care        Precautions: Left hip ORIF; currently taking estrogen, transgender, TTWB precaution current,       Manuals  83         L hip PROM SE 8'  SE 8' SE 8 ' KP 8'                                                Neuro Re-Ed             Quad Set 10x 10x  NV 20x5"         LAQ 8x 3" ecc 3x8 3" ecc NV          Bridge 2x10 3x10 NV 3x10         Half Tandem Balance             Full Tandem Balance             Standing Hip Flexion              Standing Hip Abduction             Ther Ex             NuStep  10'  10' Lv 5 10' L5         Heel Slide 1'x2 1'x2 1'x2 2x1'          Active Straight Leg Raise  2x6 x10 2x10         Total Gym  3' 3' 3'         Sidelying Hip Abduction             Right Sidelying Clamshell  3x8 3" eccentric 3x8 3" eccentric 2x10 ecc supine RTB         Lunge Progressions             Pallof Press             Ther Activity                                       Gait Training             Alter G                          Modalities

## 2022-08-08 ENCOUNTER — OFFICE VISIT (OUTPATIENT)
Dept: PHYSICAL THERAPY | Facility: REHABILITATION | Age: 47
End: 2022-08-08
Payer: COMMERCIAL

## 2022-08-08 DIAGNOSIS — M25.552 ACUTE HIP PAIN, LEFT: Primary | ICD-10-CM

## 2022-08-08 DIAGNOSIS — Z98.890 HISTORY OF OPEN REDUCTION AND INTERNAL FIXATION (ORIF) PROCEDURE: ICD-10-CM

## 2022-08-08 PROCEDURE — 97110 THERAPEUTIC EXERCISES: CPT | Performed by: PHYSICAL THERAPIST

## 2022-08-08 PROCEDURE — 97140 MANUAL THERAPY 1/> REGIONS: CPT | Performed by: PHYSICAL THERAPIST

## 2022-08-08 PROCEDURE — 97112 NEUROMUSCULAR REEDUCATION: CPT | Performed by: PHYSICAL THERAPIST

## 2022-08-08 NOTE — PROGRESS NOTES
Daily Note     Today's date: 2022  Patient name: Kath Perez  : 1975  MRN: [id number]  Referring provider: Joie Garcia MD  Dx:   Encounter Diagnosis     ICD-10-CM    1  Acute hip pain, left  M25 552    2  History of open reduction and internal fixation (ORIF) procedure  Z98 890        Start Time: 1445  Stop Time: 1530  Total time in clinic (min): 45 minutes    Subjective: Hip is getting better  I feel I am moving better and it is not as painful  I am off the pain meds now as well  Objective: See treatment diary below      Assessment: Tolerated treatment well  Less stiffness noted during PROM and more tolerant to exercises  Did attempt eccentric lower with solely left LE on total gym, but was too challenging for the patient, so exercise was regressed appropriately  Patient would benefit from continued PT      Plan: Continue per plan of care        Precautions: Left hip ORIF; currently taking estrogen, transgender, TTWB precaution current,       Manuals 7/25 7/27 8/1 8/3 8/8        L hip PROM SE 8'  SE 8' SE 8 ' KP 8' SE 8'                                                Neuro Re-Ed             Quad Set 10x 10x  NV 20x5" 2x10        LAQ 8x 3" ecc 3x8 3" ecc NV  3x10 3#        Bridge 2x10 3x10 NV 3x10 stagger 3x10        Half Tandem Balance             Full Tandem Balance             Standing Hip Flexion              Standing Hip Abduction             Ther Ex             NuStep  10'  10' Lv 5 10' L5 10' L5        Heel Slide 1'x2 1'x2 1'x2 2x1'  2x1'        Active Straight Leg Raise  2x6 x10 2x10 3x6        Total Gym  3' 3' 3' 3'        Sidelying Hip Abduction             Right Sidelying Clamshell  3x8 3" eccentric 3x8 3" eccentric 2x10 ecc supine RTB 3x8 3" rtb        Lunge Progressions             Pallof Press             Ther Activity                                       Gait Training             Alter G                          Modalities

## 2022-08-16 ENCOUNTER — TELEPHONE (OUTPATIENT)
Dept: OBGYN CLINIC | Facility: CLINIC | Age: 47
End: 2022-08-16

## 2022-08-16 NOTE — TELEPHONE ENCOUNTER
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

## 2022-08-17 ENCOUNTER — OFFICE VISIT (OUTPATIENT)
Dept: PHYSICAL THERAPY | Facility: REHABILITATION | Age: 47
End: 2022-08-17
Payer: COMMERCIAL

## 2022-08-17 DIAGNOSIS — Z98.890 HISTORY OF OPEN REDUCTION AND INTERNAL FIXATION (ORIF) PROCEDURE: ICD-10-CM

## 2022-08-17 DIAGNOSIS — M25.552 ACUTE HIP PAIN, LEFT: Primary | ICD-10-CM

## 2022-08-17 PROCEDURE — 97110 THERAPEUTIC EXERCISES: CPT

## 2022-08-17 PROCEDURE — 97140 MANUAL THERAPY 1/> REGIONS: CPT

## 2022-08-17 PROCEDURE — 97112 NEUROMUSCULAR REEDUCATION: CPT

## 2022-08-17 NOTE — PROGRESS NOTES
Daily Note     Today's date: 2022  Patient name: Kath Perez  : 1975  MRN: [id number]  Referring provider: Roger Weber MD  Dx:   Encounter Diagnosis     ICD-10-CM    1  Acute hip pain, left  M25 552    2  History of open reduction and internal fixation (ORIF) procedure  Z98 890                   Subjective: Pt reports hip has been stiff, but improvement overall with pain levels  Objective: See treatment diary below      Assessment: Tolerated treatment well  Patient would benefit from continued PT   Pt  able to complete all exercises with no increase in pain during or after session  Pt able to progress exercises this session without complaint  Pt  1:1 with PTA for entirety  Plan: Continue per plan of care        Precautions: Left hip ORIF; currently taking estrogen, transgender, TTWB precaution current,       Manuals 7/25 7/27 8/1 8/3 8/8 8/17       L hip PROM SE 8'  SE 8' SE 8 ' KP 8' SE 8'  KP 8'                                              Neuro Re-Ed             Quad Set 10x 10x  NV 20x5" 2x10 2x10       LAQ 8x 3" ecc 3x8 3" ecc NV  3x10 3# 3x10 3#       Bridge 2x10 3x10 NV 3x10 stagger 3x10 3x10 stagger       Half Tandem Balance             Full Tandem Balance             Standing Hip Flexion              Standing Hip Abduction             Ther Ex             NuStep  10'  10' Lv 5 10' L5 10' L5 10' L5       Heel Slide 1'x2 1'x2 1'x2 2x1'  2x1' 2x1'       Active Straight Leg Raise  2x6 x10 2x10 3x6 2x10       Total Gym  3' 3' 3' 3' 3'       Sidelying Hip Abduction             Right Sidelying Clamshell  3x8 3" eccentric 3x8 3" eccentric 2x10 ecc supine RTB 3x8 3" rtb 3x10 rtb sidelying       Lunge Progressions             Pallof Press             Ther Activity                                       Gait Training             Alter G                          Modalities

## 2022-08-19 ENCOUNTER — OFFICE VISIT (OUTPATIENT)
Dept: PHYSICAL THERAPY | Facility: REHABILITATION | Age: 47
End: 2022-08-19
Payer: COMMERCIAL

## 2022-08-19 DIAGNOSIS — Z98.890 HISTORY OF OPEN REDUCTION AND INTERNAL FIXATION (ORIF) PROCEDURE: ICD-10-CM

## 2022-08-19 DIAGNOSIS — M25.552 ACUTE HIP PAIN, LEFT: Primary | ICD-10-CM

## 2022-08-19 PROCEDURE — 97110 THERAPEUTIC EXERCISES: CPT

## 2022-08-19 PROCEDURE — 97112 NEUROMUSCULAR REEDUCATION: CPT

## 2022-08-19 PROCEDURE — 97140 MANUAL THERAPY 1/> REGIONS: CPT

## 2022-08-19 NOTE — TELEPHONE ENCOUNTER
Patient calling in stating that the attending physician statement also needs to be complete along with the letter that was faxed  I advised her the letter has been faxed and we received the forms and they are scanned into her chart  She states if there are any questions about the attending physician statement, the PA can contact her       cb # [phone]

## 2022-08-19 NOTE — PROGRESS NOTES
Daily Note     Today's date: 2022  Patient name: Kath Perez  : 1975  MRN: [id number]  Referring provider: Elizabeth Bhatti MD  Dx:   Encounter Diagnosis     ICD-10-CM    1  Acute hip pain, left  M25 552    2  History of open reduction and internal fixation (ORIF) procedure  Z98 890                   Subjective: Pt reports continued improvement upon arrival      Objective: See treatment diary below      Assessment: Tolerated treatment well  Patient would benefit from continued PT   Pt  able to complete all exercises with no increase in pain during or after session  Pt able to progress some exercises this session without complaint  Pt  1:1 with PTA for entirety  Plan: Continue per plan of care        Precautions: Left hip ORIF; currently taking estrogen, transgender, TTWB precaution current,       Manuals 7/25 7/27 8/1 8/3 8/8 8/17 8/19      L hip PROM SE 8'  SE 8' SE 8 ' KP 8' SE 8'  KP 8' KP 8'                                             Neuro Re-Ed             Quad Set 10x 10x  NV 20x5" 2x10 2x10 20x5"      LAQ 8x 3" ecc 3x8 3" ecc NV  3x10 3# 3x10 3# 3x10 3#      Bridge 2x10 3x10 NV 3x10 stagger 3x10 3x10 stagger 3x10 staggered      Half Tandem Balance             Full Tandem Balance             Standing Hip Flexion              Standing Hip Abduction             Ther Ex             NuStep  10'  10' Lv 5 10' L5 10' L5 10' L5 10' L5      Heel Slide 1'x2 1'x2 1'x2 2x1'  2x1' 2x1' 3'      Active Straight Leg Raise  2x6 x10 2x10 3x6 2x10 2x10      Total Gym  3' 3' 3' 3' 3' 4'      Sidelying Hip Abduction             Right Sidelying Clamshell  3x8 3" eccentric 3x8 3" eccentric 2x10 ecc supine RTB 3x8 3" rtb 3x10 rtb sidelying 3x10 rtb sidelying      Lunge Progressions             Pallof Press             Ther Activity                                       Gait Training             Kishor G                          Modalities

## 2022-08-21 DIAGNOSIS — Z98.890 S/P MUSCULOSKELETAL SYSTEM SURGERY: Primary | ICD-10-CM

## 2022-08-22 ENCOUNTER — OFFICE VISIT (OUTPATIENT)
Dept: PHYSICAL THERAPY | Facility: REHABILITATION | Age: 47
End: 2022-08-22
Payer: COMMERCIAL

## 2022-08-22 DIAGNOSIS — M25.552 ACUTE HIP PAIN, LEFT: Primary | ICD-10-CM

## 2022-08-22 DIAGNOSIS — Z98.890 HISTORY OF OPEN REDUCTION AND INTERNAL FIXATION (ORIF) PROCEDURE: ICD-10-CM

## 2022-08-22 PROCEDURE — 97110 THERAPEUTIC EXERCISES: CPT | Performed by: PHYSICAL THERAPIST

## 2022-08-22 PROCEDURE — 97112 NEUROMUSCULAR REEDUCATION: CPT | Performed by: PHYSICAL THERAPIST

## 2022-08-22 PROCEDURE — 97140 MANUAL THERAPY 1/> REGIONS: CPT | Performed by: PHYSICAL THERAPIST

## 2022-08-22 NOTE — TELEPHONE ENCOUNTER
Patient is calling back physicians statement, checking on the status if this has been faxed, her employer is asking for this so patient can return to work  Fax to  @ [phone] and give her a call back to let her know it was faxed       # [phone]

## 2022-08-22 NOTE — PROGRESS NOTES
Daily Note     Today's date: 2022  Patient name: Kath Perez  : 1975  MRN: [id number]  Referring provider: Snow Adams MD  Dx:   Encounter Diagnosis     ICD-10-CM    1  Acute hip pain, left  M25 552    2  History of open reduction and internal fixation (ORIF) procedure  Z98 890        Start Time: 1445  Stop Time: 1530  Total time in clinic (min): 45 minutes    Subjective: Hip has been feeling a lot better  More motion, less pain, and getting stronger  Objective: See treatment diary below      Assessment: Tolerated treatment well  Attempted 2 up 1 lower bridge, but was too challenging, so exercise was regressed to stagger bridge  Has follow up with ortho , and will start returning to work remotely tomorrow  Patient would benefit from continued PT      Plan: Continue per plan of care  Plan for progression once patient has consulted with ortho on         Precautions: Left hip ORIF; currently taking estrogen, transgender, TTWB precaution current,       Manuals 7/25 7/27 8/1 8/3 8/8 8/17 8/19 8/22     L hip PROM SE 8'  SE 8' SE 8 ' KP 8' SE 8'  KP 8' KP 8' SE 8'                                            Neuro Re-Ed             Quad Set 10x 10x  NV 20x5" 2x10 2x10 20x5" 20x5"     LAQ 8x 3" ecc 3x8 3" ecc NV  3x10 3# 3x10 3# 3x10 3# 3x10 4#     Bridge 2x10 3x10 NV 3x10 stagger 3x10 3x10 stagger 3x10 staggered 3x10 staggered     Half Tandem Balance             Full Tandem Balance             Standing Hip Flexion              Standing Hip Abduction             Ther Ex             NuStep  10'  10' Lv 5 10' L5 10' L5 10' L5 10' L5 10' L6     Heel Slide 1'x2 1'x2 1'x2 2x1'  2x1' 2x1' 3' 3'     Active Straight Leg Raise  2x6 x10 2x10 3x6 2x10 2x10 3x8     Total Gym  3' 3' 3' 3' 3' 4' 4'     Sidelying Hip Abduction             Right Sidelying Clamshell  3x8 3" eccentric 3x8 3" eccentric 2x10 ecc supine RTB 3x8 3" rtb 3x10 rtb sidelying 3x10 rtb sidelying [id number]      Lunge Progressions Pallof Press             Ther Activity                                       Gait Training             Alter G                          Modalities

## 2022-08-26 ENCOUNTER — HOSPITAL ENCOUNTER (OUTPATIENT)
Dept: RADIOLOGY | Facility: HOSPITAL | Age: 47
Discharge: HOME | End: 2022-08-26
Attending: ORTHOPAEDIC SURGERY
Payer: COMMERCIAL

## 2022-08-26 ENCOUNTER — OFFICE VISIT (OUTPATIENT)
Dept: OBGYN CLINIC | Facility: HOSPITAL | Age: 47
End: 2022-08-26

## 2022-08-26 VITALS
SYSTOLIC BLOOD PRESSURE: 136 MMHG | DIASTOLIC BLOOD PRESSURE: 73 MMHG | HEART RATE: 79 BPM | BODY MASS INDEX: 24.36 KG/M2 | WEIGHT: 174 LBS | HEIGHT: 71 IN

## 2022-08-26 DIAGNOSIS — Z98.890 S/P MUSCULOSKELETAL SYSTEM SURGERY: Primary | ICD-10-CM

## 2022-08-26 DIAGNOSIS — Z98.890 S/P MUSCULOSKELETAL SYSTEM SURGERY: ICD-10-CM

## 2022-08-26 PROCEDURE — 73502 X-RAY EXAM HIP UNI 2-3 VIEWS: CPT

## 2022-08-26 PROCEDURE — 99024 POSTOP FOLLOW-UP VISIT: CPT | Performed by: ORTHOPAEDIC SURGERY

## 2022-08-26 NOTE — PROGRESS NOTES
Orthopaedics Office Visit - Post-op Patient Visit    ASSESSMENT/PLAN:    Assessment:   s/p left femoral neck fracture ORIF, DOS 7/7/22  Minimal pain, doing well    Plan:   Patient is progressing nicely on exam today  Continue physical therapy, progress strengthening  WBAT  Follow up 3 mos      To Do Next Visit:  X-rays    _____________________________________________________  CHIEF COMPLAINT:  Chief Complaint   Patient presents with    Left Hip - Post-op         SUBJECTIVE:  Consuelo Catherine is a 40 y o  adult who presents 7 wks s/p left femoral neck fracture ORIF, DOS 7/7/22  Patient is attending PT as instructed and progressing nicely  She reports she has tried full WB with a cane last week which is pain free  Patient has stopped Tramadol and using OTC pain medication      SOCIAL HISTORY:  Social History     Tobacco Use    Smoking status: Current Every Day Smoker     Types: Cigarettes    Smokeless tobacco: Never Used   Vaping Use    Vaping Use: Never used   Substance Use Topics    Alcohol use: Not Currently    Drug use: Never       MEDICATIONS:    Current Outpatient Medications:     bicalutamide (CASODEX) 50 mg tablet, Take 50 mg by mouth daily, Disp: , Rfl:     estradiol (ESTRACE) 2 MG tablet, Take 2 mg by mouth 4 (four) times a day, Disp: , Rfl:     finasteride (PROSCAR) 5 mg tablet, Take 5 mg by mouth daily, Disp: , Rfl:     Multiple Vitamins-Minerals (multivitamin with minerals) tablet, Take 1 tablet by mouth daily, Disp: , Rfl:     progesterone 200 mg vaginal suppository, Insert 1 suppository (200 mg total) into the vagina in the morning, Disp: 30 suppository, Rfl: 0    traZODone (DESYREL) 50 mg tablet, Take 1 tablet (50 mg total) by mouth daily at bedtime, Disp: 30 tablet, Rfl: 0    enoxaparin (LOVENOX) 30 mg/0 3 mL, Inject 0 3 mL (30 mg total) under the skin every 12 (twelve) hours for 16 days, Disp: 18 mL, Rfl: 0    traMADol (Ultram) 50 mg tablet, Take 1 tablet (50 mg total) by mouth every 8 (eight) hours as needed for moderate pain (Patient not taking: Reported on 8/26/2022), Disp: 30 tablet, Rfl: 0    REVIEW OF SYSTEMS:  MSK: see below  Neuro: NVI  Pertinent items are otherwise noted in HPI  A comprehensive review of systems was otherwise negative     _____________________________________________________  PHYSICAL EXAMINATION:  Vital signs: /73   Pulse 79   Ht 5' 11" (1 803 m)   Wt 78 9 kg (174 lb)   BMI 24 27 kg/m²   General: No acute distress, awake and alert  Psychiatric: Mood and affect appear appropriate  HEENT: Trachea Midline, No torticollis, no apparent facial trauma  Cardiovascular: No audible murmurs; Extremities appear perfused  Pulmonary: No audible wheezing or stridor  Skin: No open lesions; see further details (if any) below    MUSCULOSKELETAL EXAMINATION:  Extremities: Left hip   No erythema, ecchymosis or edema  Incision is clean and dry with staples intact   Extremity appears warm and well perfused  No pain with ROM       _____________________________________________________  STUDIES REVIEWED:  I personally reviewed the images and interpretation is as follows:  Left hip / AP pelvis x-rays demonstrates: progressive healing of fracture, hardware in acceptable alignment and position       PROCEDURES PERFORMED:  Procedures    Jenaro Rodriguez MD

## 2022-08-26 NOTE — TELEPHONE ENCOUNTER
Patient is calling back physicians statement, checking on the status if this has been faxed, her employer is asking for this so patient can return to work      Fax to  @ [phone] and give her a call back to let her know it was faxed       # [phone]

## 2022-09-01 ENCOUNTER — APPOINTMENT (OUTPATIENT)
Dept: PHYSICAL THERAPY | Facility: REHABILITATION | Age: 47
End: 2022-09-01
Payer: COMMERCIAL

## 2022-09-07 ENCOUNTER — OFFICE VISIT (OUTPATIENT)
Dept: PHYSICAL THERAPY | Facility: REHABILITATION | Age: 47
End: 2022-09-07
Payer: COMMERCIAL

## 2022-09-07 DIAGNOSIS — Z98.890 HISTORY OF OPEN REDUCTION AND INTERNAL FIXATION (ORIF) PROCEDURE: ICD-10-CM

## 2022-09-07 DIAGNOSIS — M25.552 ACUTE HIP PAIN, LEFT: Primary | ICD-10-CM

## 2022-09-07 PROCEDURE — 97112 NEUROMUSCULAR REEDUCATION: CPT | Performed by: PHYSICAL THERAPIST

## 2022-09-07 PROCEDURE — 97110 THERAPEUTIC EXERCISES: CPT | Performed by: PHYSICAL THERAPIST

## 2022-09-07 NOTE — PROGRESS NOTES
Daily Note     Today's date: 2022  Patient name: Kath Perez  : 1975  MRN: [id number]  Referring provider: Roger Weber MD  Dx:   Encounter Diagnosis     ICD-10-CM    1  Acute hip pain, left  M25 552    2  History of open reduction and internal fixation (ORIF) procedure  Z98 890                   Subjective: Hip is doing better  Back to work  Strength coming back, but still need to work on it  Doing better with walking and stairs too  Objective: See treatment diary below      Assessment: Tolerated treatment well  Was able to do well with weight bearing progressions, now that patient is cleared by referring physician to begin WBAT  Does have lateral trunk lean compensation when attempting to place single limb stability on left lower extremity  Was moderately fatigued post treatment today, without exacerbation of any hip symptoms  Patient would benefit from continued PT      Plan: Continue per plan of care        Precautions: Left hip ORIF; currently taking estrogen, transgender, WBAT       Manuals 7/25 7/27 8/1 8/3 8/8 8/17 8/19 8/22 9/7    L hip PROM SE 8'  SE 8' SE 8 ' KP 8' SE 8'  KP 8' KP 8' SE 8'                                            Neuro Re-Ed             LAQ 8x 3" ecc 3x8 3" ecc NV  3x10 3# 3x10 3# 3x10 3# 3x10 4# HEP    Bridge 2x10 3x10 NV 3x10 stagger 3x10 3x10 stagger 3x10 staggered 3x10 staggered HEP    Half Tandem Balance             Kettlebell Deadlift - From 9" Step         3x10 20#    Standing Hip Flexion          3x10    Standing Hip Abduction         3x10    Ther Ex             NuStep  10'  10' Lv 5 10' L5 10' L5 10' L5 10' L5 10' L6 10' L6    Heel Slide 1'x2 1'x2 1'x2 2x1'  2x1' 2x1' 3' 3' HEP    Active Straight Leg Raise  2x6 x10 2x10 3x6 2x10 2x10 3x8 HEP    Total Gym  3' 3' 3' 3' 3' 4' 4' DC    Leg Press         3x12 100#     Right Sidelying Clamshell  3x8 3" eccentric 3x8 3" eccentric 2x10 ecc supine RTB 3x8 3" rtb 3x10 rtb sidelying 3x10 rtb sidelying [id number]  DC Lunge Progressions         Trial nv    Pallof Press         Trial nv    Ther Activity                                       Gait Training             Alter G             Step Ups         Trial nv    Modalities

## 2022-09-09 ENCOUNTER — APPOINTMENT (OUTPATIENT)
Dept: PHYSICAL THERAPY | Facility: REHABILITATION | Age: 47
End: 2022-09-09
Payer: COMMERCIAL

## 2022-09-14 ENCOUNTER — OFFICE VISIT (OUTPATIENT)
Dept: PHYSICAL THERAPY | Facility: REHABILITATION | Age: 47
End: 2022-09-14
Payer: COMMERCIAL

## 2022-09-14 DIAGNOSIS — Z98.890 HISTORY OF OPEN REDUCTION AND INTERNAL FIXATION (ORIF) PROCEDURE: ICD-10-CM

## 2022-09-14 DIAGNOSIS — M25.552 ACUTE HIP PAIN, LEFT: Primary | ICD-10-CM

## 2022-09-14 PROCEDURE — 97110 THERAPEUTIC EXERCISES: CPT | Performed by: PHYSICAL THERAPIST

## 2022-09-14 PROCEDURE — 97112 NEUROMUSCULAR REEDUCATION: CPT | Performed by: PHYSICAL THERAPIST

## 2022-09-14 PROCEDURE — 97140 MANUAL THERAPY 1/> REGIONS: CPT | Performed by: PHYSICAL THERAPIST

## 2022-09-14 NOTE — PROGRESS NOTES
Daily Note     Today's date: 2022  Patient name: Kath Perez  : 1975  MRN: [id number]  Referring provider: Becka Bingham MD  Dx:   Encounter Diagnosis     ICD-10-CM    1  Acute hip pain, left  M25 552    2  History of open reduction and internal fixation (ORIF) procedure  Z98 890                   Subjective: Hip is continuing to get better  I feel that I still get tight a lot  Have not worked my exercises as much as I should  Objective: See treatment diary below      Assessment: Tolerated treatment well  Did implement hip hinge movement pattern today, as patient was having difficulty with movement coordination pattern of deadlift kettlebell exercise  Movement did improve after hip hinge cueing was given  Reported more of a challenge trying to stabilize body when standing on left lower extremity on uneven surface of foam pad  Will need to continue improving strength especially in single limb dominant positions  Patient would benefit from continued PT      Plan: Continue per plan of care        Precautions: Left hip ORIF; currently taking estrogen, transgender, WBAT       Manuals 7/25 7/27 8/1 8/3 8/8 8/17 8/19 8/22 9/7 9/13   L hip PROM SE 8'  SE 8' SE 8 ' KP 8' SE 8'  KP 8' KP 8' SE 8'  SE 8'                                           Neuro Re-Ed             Sidesteps          4 laps rtb ankles   Hip Hinge - Dowel          2x10 with PT cue   Kettlebell Deadlift           4x8 yellow KB   Standing Hip Flexion           3x10 3#   Standing Hip Abduction          3x10 3#    Ther Ex             NuStep  10'  10' Lv 5 10' L5 10' L5 10' L5 10' L5 10' L6 10' L6 10' L7   Leg Press         3x12 100#  3x12 115#   Lunge Progressions          3x8 static    Pallof Press          2x15 mtb B   Ther Activity                                       Gait Training             Alter G             Step Ups             Modalities

## 2022-09-16 ENCOUNTER — OFFICE VISIT (OUTPATIENT)
Dept: PHYSICAL THERAPY | Facility: REHABILITATION | Age: 47
End: 2022-09-16
Payer: COMMERCIAL

## 2022-09-16 DIAGNOSIS — Z98.890 HISTORY OF OPEN REDUCTION AND INTERNAL FIXATION (ORIF) PROCEDURE: ICD-10-CM

## 2022-09-16 DIAGNOSIS — M25.552 ACUTE HIP PAIN, LEFT: Primary | ICD-10-CM

## 2022-09-16 PROCEDURE — 97110 THERAPEUTIC EXERCISES: CPT | Performed by: PHYSICAL THERAPIST

## 2022-09-16 PROCEDURE — 97112 NEUROMUSCULAR REEDUCATION: CPT | Performed by: PHYSICAL THERAPIST

## 2022-09-16 NOTE — PROGRESS NOTES
Daily Note     Today's date: 2022  Patient name: Kath Perez  : 1975  MRN: [id number]  Referring provider: Aby Del Cid MD  Dx:   Encounter Diagnosis     ICD-10-CM    1  Acute hip pain, left  M25 552    2  History of open reduction and internal fixation (ORIF) procedure  Z98 890                   Subjective: Recovered well after last session  Walked without my cane today, felt pretty good  Objective: See treatment diary below      Assessment: Tolerated treatment well  Did present to the clinic without single point cane today  Does continue to have compensated trendelenburg gait without assistive device  Implemented step variations today with ankle weights on  Did have most challenge with lateral step up and eccentric control during the step down portion of the movement  Patient would benefit from continued PT      Plan: Continue per plan of care  Re-evaluation next visit        Precautions: Left hip ORIF; currently taking estrogen, transgender, WBAT       Manuals             L hip PROM                                                    Neuro Re-Ed             Sidesteps             Sidelying Total Gym  2x15 B            Kettlebell Deadlift  Yellow KB 4x8            Standing Hip Flexion  marches 2 rounds of 5 laps 3# ankle weights            Standing Hip Abduction 3# 3x10 B on foam            Ther Ex             NuStep 10' L8            Leg Press             Lunge Progressions             Pallof Press             Ther Activity                                       Gait Training             Alter G             Step Ups F/Lat 6" 2x15 each            Modalities

## 2022-09-19 ENCOUNTER — EVALUATION (OUTPATIENT)
Dept: PHYSICAL THERAPY | Facility: REHABILITATION | Age: 47
End: 2022-09-19
Payer: COMMERCIAL

## 2022-09-19 DIAGNOSIS — Z98.890 HISTORY OF OPEN REDUCTION AND INTERNAL FIXATION (ORIF) PROCEDURE: ICD-10-CM

## 2022-09-19 DIAGNOSIS — M25.552 ACUTE HIP PAIN, LEFT: Primary | ICD-10-CM

## 2022-09-19 PROCEDURE — 97112 NEUROMUSCULAR REEDUCATION: CPT | Performed by: PHYSICAL THERAPIST

## 2022-09-19 PROCEDURE — 97110 THERAPEUTIC EXERCISES: CPT | Performed by: PHYSICAL THERAPIST

## 2022-09-19 NOTE — PROGRESS NOTES
PT Re-Evaluation    Today's date: 2022  Patient name: Kath Perez  : 1975  MRN: [id number]  Referring provider: Sha Amaya MD  Dx:   Encounter Diagnosis     ICD-10-CM    1  Acute hip pain, left  M25 552    2  History of open reduction and internal fixation (ORIF) procedure  Z98 890                   Subjective: The hip has been doing better  Walking has been going pretty good, but I still am relying on the cane  The pain is not as bad as it was originally  Balance and strength are still off  Pain:   Current: 0   Best: 0   Worst: 4      Objective: See treatment diary below    Hip AROM:   Flexion: WNL   Extension: 25% limited  Hip IR: 25% limited   Hip ER: 25% limited  Hip Abduction: WNL   Hip Adduction: WNL     Hip PROM:   Flexion: WNL   Extension: WNL   Hip IR: 15% limited  Hip ER: 15% limited  Hip Abduction: WNL   Hip Adduction: WNL     Strength:   Hip Flexion: 4+/5   Hip Abduction: 4-/5   Hip Extension Knee Extended: 4-/5   Hip Extension Knee Flexed: 4-/5   Knee Extension: 4+/5   Knee Flexion: 5/5     Hip Flexor Tightness: Moderate     TU seconds with single point cane    Gait: Left lateral trunk lean trendelenburg; utilizing single point cane       Assessment: The patient has been able to make good progress since initiating physical therapy  She has continued to improve in regards to range of motion, strength, and overall function  The patient does continue to have deficits even though she has improved, in regards to her balance, gait, and strength mostly  She does continue to rely on a single point cane for ambulation, and has difficulty ambulating long distances  In addition, the patient continues to have weakness in the hip both in the open and closed chain, that will need to addressed still following her ORIF procedure   The patient would continue to benefit from skilled physical therapy to address her current deficits, improve her quality of life, and restore her PLOF     Goals  Impairment Based Goals:   Patient will improve FOTO score greater than predicted increase in 4 weeks  Patient will improve strength by at least 1/2 grade in 4 weeks  (progress)  Patient will have decrease in pain by at least 50% in 4 weeks  (met)  Patient will restore normalized gait pattern by discharge  (not met)    Functional Based Goals: By Discharge  Patient will be independent with home exercise program  (not met)  Patient will be able to manage symptoms independently  (not met)      Plan: Continue per plan of care        Precautions: Left hip ORIF; currently taking estrogen, transgender, WBAT       Manuals 9/16 9/19           L hip PROM                                                    Neuro Re-Ed             Sidesteps             Sidelying Total Gym  2x15 B 2x15           Kettlebell Deadlift  Yellow KB 4x8 4x8 yellow KB           Standing Hip Flexion  marches 2 rounds of 5 laps 3# ankle weights 3# ankle weights 2x5 laps           Standing Hip Abduction 3# 3x10 B on foam 3 way yovany 8# 2x10 B           Ther Ex             NuStep 10' L8 10' L8           Leg Press  4x8 100#           Lunge Progressions             Pallof Press             Ther Activity                                       Gait Training             Alter G             Step Ups F/Lat 6" 2x15 each 6" 2x15 3# ankle weights           Modalities

## 2022-09-21 ENCOUNTER — OFFICE VISIT (OUTPATIENT)
Dept: PHYSICAL THERAPY | Facility: REHABILITATION | Age: 47
End: 2022-09-21
Payer: COMMERCIAL

## 2022-09-21 DIAGNOSIS — Z98.890 HISTORY OF OPEN REDUCTION AND INTERNAL FIXATION (ORIF) PROCEDURE: ICD-10-CM

## 2022-09-21 DIAGNOSIS — M25.552 ACUTE HIP PAIN, LEFT: Primary | ICD-10-CM

## 2022-09-21 PROCEDURE — 97112 NEUROMUSCULAR REEDUCATION: CPT | Performed by: PHYSICAL THERAPIST

## 2022-09-21 PROCEDURE — 97110 THERAPEUTIC EXERCISES: CPT | Performed by: PHYSICAL THERAPIST

## 2022-09-21 PROCEDURE — 97530 THERAPEUTIC ACTIVITIES: CPT | Performed by: PHYSICAL THERAPIST

## 2022-09-26 ENCOUNTER — OFFICE VISIT (OUTPATIENT)
Dept: PHYSICAL THERAPY | Facility: REHABILITATION | Age: 47
End: 2022-09-26
Payer: COMMERCIAL

## 2022-09-26 DIAGNOSIS — Z98.890 HISTORY OF OPEN REDUCTION AND INTERNAL FIXATION (ORIF) PROCEDURE: ICD-10-CM

## 2022-09-26 DIAGNOSIS — M25.552 ACUTE HIP PAIN, LEFT: Primary | ICD-10-CM

## 2022-09-26 PROCEDURE — 97110 THERAPEUTIC EXERCISES: CPT | Performed by: PHYSICAL THERAPIST

## 2022-09-26 PROCEDURE — 97112 NEUROMUSCULAR REEDUCATION: CPT | Performed by: PHYSICAL THERAPIST

## 2022-09-26 NOTE — PROGRESS NOTES
Daily Note     Today's date: 2022  Patient name: aKth Perez  : 1975  MRN: [id number]  Referring provider: Devota Phalen, MD  Dx:   Encounter Diagnosis     ICD-10-CM    1  Acute hip pain, left  M25 552    2  History of open reduction and internal fixation (ORIF) procedure  Z98 890                   Subjective: The hip is getting better  My biggest issue remaining is the compensation I make when walking, as I lean towards the left hip  Objective: See treatment diary below      Assessment: Tolerated treatment well  Did implement more unilateral loading for left hip today to address trendelenburg patient has during gait secondary to decreased hip strength in weight bearing positions  Did require hand support both with single leg RDL as well as hip hike and isometric hip abduction exercise against the wall  Moderately fatigued post treatment today  Patient would benefit from continued PT      Plan: Continue per plan of care        Precautions: Left hip ORIF; currently taking estrogen, transgender, WBAT       Manuals           L hip PROM                                                    Neuro Re-Ed             Sidesteps             Sidelying Total Gym  2x15 B 2x15 2x15  2x15 B         Kettlebell Deadlift  Yellow KB 4x8 4x8 yellow KB 4x8 yellow KB resume nv         Standing Hip Flexion  marches 2 rounds of 5 laps 3# ankle weights 3# ankle weights 2x5 laps 3# ankle weights 3x5 laps  resume nv         Single Leg RDL    3x8 B         Hip Hike 6" step    3x10 B         Isometric Hip Abduction Against Wall - Hand Support    2x8 B 5" holds         Standing Hip Abduction 3# 3x10 B on foam 3 way yovany 8# 2x10 B 3# weights on foam 3x10 resume nv         Ther Ex             NuStep 10' L8 10' L8 10' L8 10' L8         Leg Press  4x8 100# 4x12 100#/3x10 40# SL 4x12 100#/3x10 40# SL         Lunge Progressions             Pallof Press             Ther Activity             Sit to Stand    15# 3x12 staggered with riser 3x10                      Gait Training             Alter G             Step Ups F/Lat 6" 2x15 each 6" 2x15 3# ankle weights 6" 2x15 3 # ankle weights F/L 6" 2x15 3# ankle weights F/L         Modalities

## 2022-09-28 ENCOUNTER — OFFICE VISIT (OUTPATIENT)
Dept: PHYSICAL THERAPY | Facility: REHABILITATION | Age: 47
End: 2022-09-28
Payer: COMMERCIAL

## 2022-09-28 DIAGNOSIS — M25.552 ACUTE HIP PAIN, LEFT: Primary | ICD-10-CM

## 2022-09-28 DIAGNOSIS — Z98.890 HISTORY OF OPEN REDUCTION AND INTERNAL FIXATION (ORIF) PROCEDURE: ICD-10-CM

## 2022-09-28 PROCEDURE — 97110 THERAPEUTIC EXERCISES: CPT | Performed by: PHYSICAL THERAPIST

## 2022-09-28 PROCEDURE — 97112 NEUROMUSCULAR REEDUCATION: CPT | Performed by: PHYSICAL THERAPIST

## 2022-09-28 NOTE — PROGRESS NOTES
Daily Note     Today's date: 2022  Patient name: Kath Perez  : 1975  MRN: [id number]  Referring provider: Elizabeth Bhatti MD  Dx:   Encounter Diagnosis     ICD-10-CM    1  Acute hip pain, left  M25 552    2  History of open reduction and internal fixation (ORIF) procedure  Z98 890                   Subjective: Was pretty sore after last session, but well recovered as of this morning  Objective: See treatment diary below      Assessment: Tolerated treatment well  Did introduce Y-balance on foam pad to work on single limb stability and dynamic balance  Did require hand support intermittently to complete this task  Was moderately fatigued post treatment today  Patient would benefit from continued PT      Plan: Continue per plan of care        Precautions: Left hip ORIF; currently taking estrogen, transgender, WBAT       Manuals         L hip PROM                                                    Neuro Re-Ed             Sidesteps             Sidelying Total Gym  2x15 B 2x15 2x15  2x15 B 2x15 B        Kettlebell Deadlift  Yellow KB 4x8 4x8 yellow KB 4x8 yellow KB resume nv np        Standing Hip Flexion  marches 2 rounds of 5 laps 3# ankle weights 3# ankle weights 2x5 laps 3# ankle weights 3x5 laps  resume nv np        Single Leg RDL    3x8 B 3x8 B        Hip Hike 6" step    3x10 B 3x10 B        Isometric Hip Abduction Against Wall - Hand Support    2x8 B 5" holds 2x8 5" holds        Y Balance - on Foam     2x5 B        Ther Ex             NuStep 10' L8 10' L8 10' L8 10' L8 10' L8        Leg Press  4x8 100# 4x12 100#/3x10 40# SL 4x12 100#/3x10 40# SL 4x12 100#/3x8 40# SL        Lunge Progressions             Pallof Press             Ther Activity             Sit to Stand    15# 3x12 staggered with riser 3x10 3x10 stagggered riser                     Gait Training             Alter G             Step Ups F/Lat 6" 2x15 each 6" 2x15 3# ankle weights 6" 2x15 3 # ankle weights F/L 6" 2x15 3# ankle weights F/L 6" 2x15 3# ankle weights F/L        Modalities

## 2022-10-07 ENCOUNTER — OFFICE VISIT (OUTPATIENT)
Dept: PHYSICAL THERAPY | Facility: REHABILITATION | Age: 47
End: 2022-10-07
Payer: COMMERCIAL

## 2022-10-07 DIAGNOSIS — M25.552 ACUTE HIP PAIN, LEFT: Primary | ICD-10-CM

## 2022-10-07 DIAGNOSIS — Z98.890 HISTORY OF OPEN REDUCTION AND INTERNAL FIXATION (ORIF) PROCEDURE: ICD-10-CM

## 2022-10-07 PROCEDURE — 97110 THERAPEUTIC EXERCISES: CPT | Performed by: PHYSICAL THERAPIST

## 2022-10-07 PROCEDURE — 97112 NEUROMUSCULAR REEDUCATION: CPT | Performed by: PHYSICAL THERAPIST

## 2022-10-07 NOTE — PROGRESS NOTES
Daily Note     Today's date: 10/7/2022  Patient name: Kath Perez  : 1975  MRN: [id number]  Referring provider: Rachid Alfaro MD  Dx:   Encounter Diagnosis     ICD-10-CM    1  Acute hip pain, left  M25 552    2  History of open reduction and internal fixation (ORIF) procedure  Z98 890                   Subjective: Hip is a bit tight today  We were in the office this week and I was mostly sitting, so I have not been able to move around too much  Objective: See treatment diary below      Assessment: Tolerated treatment well  Did utilize slider reverse lunge to work on posterior chain as well as open up left hip anterior region  Did also progress to side step with pallof press today to work on core stability in the frontal plane  Moderately fatigued post treatment  Patient would benefit from continued PT  Plan: Continue per plan of care        Precautions: Left hip ORIF; currently taking estrogen, transgender, WBAT       Manuals  10/7       L hip PROM                                                    Neuro Re-Ed             Sidesteps with pallof press      2x5 B       Sidelying Total Gym  2x15 B 2x15 2x15  2x15 B 2x15 B 2x15 B       Kettlebell Deadlift  Yellow KB 4x8 4x8 yellow KB 4x8 yellow KB resume nv np 4x8 yellow KB       Slider Hip Extension      2x8 B       Single Leg RDL    3x8 B 3x8 B 3x8 B       Hip Hike 6" step    3x10 B 3x10 B 3x10 B       Isometric Hip Abduction Against Wall - Hand Support    2x8 B 5" holds 2x8 5" holds 2x8 5" holds       Y Balance - on Foam     2x5 B 2x5 B       Ther Ex             NuStep 10' L8 10' L8 10' L8 10' L8 10' L8 10' L8       Leg Press  4x8 100# 4x12 100#/3x10 40# SL 4x12 100#/3x10 40# SL 4x12 100#/3x8 40# SL 4x10 115#/3x8 40# SL       Lunge Progressions             Pallof Press             Ther Activity             Sit to Stand    15# 3x12 staggered with riser 3x10 3x10 stagggered riser 3x10 staggered riser                    Gait Training             Alter G             Step Ups F/Lat 6" 2x15 each 6" 2x15 3# ankle weights 6" 2x15 3 # ankle weights F/L 6" 2x15 3# ankle weights F/L 6" 2x15 3# ankle weights F/L 6" 2x15 3# ankle weights F/L       Modalities

## 2022-10-11 ENCOUNTER — APPOINTMENT (OUTPATIENT)
Dept: PHYSICAL THERAPY | Facility: REHABILITATION | Age: 47
End: 2022-10-11

## 2022-10-14 ENCOUNTER — OFFICE VISIT (OUTPATIENT)
Dept: PHYSICAL THERAPY | Facility: REHABILITATION | Age: 47
End: 2022-10-14
Payer: COMMERCIAL

## 2022-10-14 DIAGNOSIS — Z98.890 HISTORY OF OPEN REDUCTION AND INTERNAL FIXATION (ORIF) PROCEDURE: ICD-10-CM

## 2022-10-14 DIAGNOSIS — M25.552 ACUTE HIP PAIN, LEFT: Primary | ICD-10-CM

## 2022-10-14 PROCEDURE — 97112 NEUROMUSCULAR REEDUCATION: CPT

## 2022-10-14 PROCEDURE — 97530 THERAPEUTIC ACTIVITIES: CPT

## 2022-10-14 PROCEDURE — 97110 THERAPEUTIC EXERCISES: CPT

## 2022-10-14 NOTE — PROGRESS NOTES
Daily Note     Today's date: 10/14/2022  Patient name: Kath Perez  : 1975  MRN: [id number]  Referring provider: Donna Dennis MD  Dx:   Encounter Diagnosis     ICD-10-CM    1  Acute hip pain, left  M25 552    2  History of open reduction and internal fixation (ORIF) procedure  Z98 890                   Subjective: Pt reports overall improvement, states she's still using the cane mainly out of habit vs necessity  Objective: See treatment diary below      Assessment: Tolerated treatment well  Patient would benefit from continued PT   Pt  able to complete all exercises with no increase in pain during or after session  Pt able to progress some exercises this session without complaint  Pt  1:1 with PTA for entirety  Plan: Continue per plan of care        Precautions: Left hip ORIF; currently taking estrogen, transgender, WBAT       Manuals 9/16 9/19 9/21 9/26 9/28 10/7 10/14      L hip PROM                                                    Neuro Re-Ed             Sidesteps with pallof press      2x5 B np      Sidelying Total Gym  2x15 B 2x15 2x15  2x15 B 2x15 B 2x15 B np      Kettlebell Deadlift  Yellow KB 4x8 4x8 yellow KB 4x8 yellow KB resume nv np 4x8 yellow KB 4x10 35#      Slider Hip Extension      2x8 B 2x10 B       Single Leg RDL    3x8 B 3x8 B 3x8 B np      Hip Hike 6" step    3x10 B 3x10 B 3x10 B 3x10 B      Isometric Hip Abduction Against Wall - Hand Support    2x8 B 5" holds 2x8 5" holds 2x8 5" holds 2x10 5"      Y Balance - on Foam     2x5 B 2x5 B 2x5 b/l      Ther Ex             NuStep 10' L8 10' L8 10' L8 10' L8 10' L8 10' L8 10' L8      Leg Press  4x8 100# 4x12 100#/3x10 40# SL 4x12 100#/3x10 40# SL 4x12 100#/3x8 40# SL 4x10 115#/3x8 40# SL 4x10 115#/3x10 40# SL      Lunge Progressions             Pallof Press             Ther Activity             Sit to Stand    15# 3x12 staggered with riser 3x10 3x10 stagggered riser 3x10 staggered riser np                   Gait Training Alter G             Step Ups F/Lat 6" 2x15 each 6" 2x15 3# ankle weights 6" 2x15 3 # ankle weights F/L 6" 2x15 3# ankle weights F/L 6" 2x15 3# ankle weights F/L 6" 2x15 3# ankle weights F/L 2x15 6" 3" fsu/lsu      Modalities

## 2023-01-19 NOTE — PROGRESS NOTES
Diagnoses and all orders for this visit:    Anal condyloma       Anal condyloma  Patient presents for evaluation of perianal concerns  She notes that she has had HPV disease in the past but is worsened recently  She had topical agents applied externally but notes on examination by her primary care physician she had internal gross as well  Examination shows internal and external circumferential anal condyloma  We discussed the nature and history of HPV related disease  In terms of her anus I have recommended exam under anesthesia with excision and fulguration of anal condyloma  Risks of anal surgery, including but not limited to pain, bleeding, failure to heal properly, fecal incontinence, persistent or recurrent anal disease, infection, fistula, abscess were reviewed  Questions were answered  As she is over the age of 40, I have recommended colorectal cancer screening in the form of colonoscopy  I will perform this prior to surgical procedure in order to ensure that there is no more concerning proximal pathology  Risks and benefits of colonoscopy reviewed with patient to include, but not be limited to: anesthesia, bleeding, missed lesion and perforation requiring surgery  Patient consents to procedure  BRITANY aBnks is here today for evaluation  The patient reports an HPV positive diagnosis around 8 year ago in [place]  The patient notes new growths on her anus  The patient notes the current using of Condylox gel and notes bright red bleeding on the tissue paper with bowel movements; the patient notes the symptoms started over a month ago  The patient has no prior colonoscopy and denies any family history of colorectal cancer or polyps  No past medical history on file    Past Surgical History:   Procedure Laterality Date   • ORIF HIP FRACTURE Left 7/7/2022    Procedure: OPEN REDUCTION W/ INTERNAL FIXATION (ORIF) LEFT FEMORAL NECK FRACTURE;  Surgeon: Adelfa Boast, MD; Location: [facility];  Service: Orthopedics       Current Outpatient Medications:   •  bicalutamide (CASODEX) 50 mg tablet, Take 50 mg by mouth daily, Disp: , Rfl:   •  estradiol (ESTRACE) 2 MG tablet, Take 2 mg by mouth 4 (four) times a day, Disp: , Rfl:   •  finasteride (PROSCAR) 5 mg tablet, Take 5 mg by mouth daily, Disp: , Rfl:   •  Multiple Vitamins-Minerals (multivitamin with minerals) tablet, Take 1 tablet by mouth daily, Disp: , Rfl:   •  podofilox (CONDYLOX) 0 5 % gel, Apply topically 2 (two) times a day, Disp: , Rfl:   •  progesterone 200 mg vaginal suppository, Insert 1 suppository (200 mg total) into the vagina in the morning, Disp: 30 suppository, Rfl: 0  •  traZODone (DESYREL) 50 mg tablet, Take 1 tablet (50 mg total) by mouth daily at bedtime, Disp: 30 tablet, Rfl: 0  •  enoxaparin (LOVENOX) 30 mg/0 3 mL, Inject 0 3 mL (30 mg total) under the skin every 12 (twelve) hours for 16 days, Disp: 18 mL, Rfl: 0  •  traMADol (Ultram) 50 mg tablet, Take 1 tablet (50 mg total) by mouth every 8 (eight) hours as needed for moderate pain (Patient not taking: Reported on 8/26/2022), Disp: 30 tablet, Rfl: 0  Allergies as of 01/20/2023 - Reviewed 01/20/2023   Allergen Reaction Noted   • Penicillins Rash and Fever 02/18/2022   • Sulfa antibiotics Rash and Fever 02/18/2022     Review of Systems   All other systems reviewed and are negative  There were no vitals filed for this visit  Physical Exam  Constitutional:       Appearance: Normal appearance  HENT:      Head: Normocephalic and atraumatic  Eyes:      Extraocular Movements: Extraocular movements intact  Pupils: Pupils are equal, round, and reactive to light  Musculoskeletal:         General: Normal range of motion  Skin:     General: Skin is warm and dry  Neurological:      General: No focal deficit present  Mental Status: She is alert and oriented to person, place, and time     Psychiatric:         Mood and Affect: Mood normal  Behavior: Behavior normal          Thought Content: Thought content normal          Judgment: Judgment normal      Lower Endoscopy    Date/Time: 1/20/2023 10:42 AM  Performed by: Abran Ho MD  Authorized by: Abran Ho MD     Verbal consent obtained?: Yes    Risks and benefits: Risks, benefits and alternatives were discussed    Consent given by:  Patient  Indications: condyloma    Patient sedated: No    Scope type:   Anoscope  External exam performed: Yes    Pilonidal sinus tract: No    Pilonidal cyst: No    Pilonidal tenderness: No    Perianal skin tags: No    Perirectal warts: Yes    Perianal maceration: No    Perianal induration: No    Perianal erythema: No    External hemorrhoids: No    Digital exam performed: Yes    Laxity of anal sphincter: No    Internal hemorrhoids: No    Intraluminal mass: No    Inflammation: No    Anal fissures: No    Anal fistulae: No    Anal stricture: No    Abscess: No    Procedure termination:  Procedure complete  Patient tolerance:  Patient tolerated the procedure well with no immediate complications

## 2023-01-20 ENCOUNTER — OFFICE VISIT (OUTPATIENT)
Age: 48
End: 2023-01-20

## 2023-01-20 ENCOUNTER — TELEPHONE (OUTPATIENT)
Age: 48
End: 2023-01-20

## 2023-01-20 ENCOUNTER — PREP FOR PROCEDURE (OUTPATIENT)
Age: 48
End: 2023-01-20

## 2023-01-20 ENCOUNTER — NURSE TRIAGE (OUTPATIENT)
Age: 48
End: 2023-01-20

## 2023-01-20 VITALS — BODY MASS INDEX: 24.22 KG/M2 | WEIGHT: 173 LBS | HEIGHT: 71 IN

## 2023-01-20 DIAGNOSIS — A63.0 ANAL CONDYLOMA: Primary | ICD-10-CM

## 2023-01-20 DIAGNOSIS — Z12.11 SCREENING FOR COLON CANCER: Primary | ICD-10-CM

## 2023-01-20 DIAGNOSIS — A63.0 CONDYLOMA: Primary | ICD-10-CM

## 2023-01-20 NOTE — TELEPHONE ENCOUNTER
Patient scheduled for surgery 3/22/2023  at [facility] AND [facility] OR  Instructions and PAT's gone over with and handed to the patient

## 2023-01-20 NOTE — LETTER
[address]        ------------------------------------------------------------------------------------------------------------    1/20/2023    Dear Kari Wray,    Your surgery is scheduled for:  3/22/2023  The hospital will call the evening prior to your surgery with your expected arrival time  Location:[facility] [address] , [facility] [address]    CHECK LIST PRIOR TO OUTPATIENT SURGERY  It is your responsibility to obtain any/all referrals needed for your surgery if required by your insurance  Our office will contact you to discuss your insurance coverage for this procedure  Special instructions required if you are taking any blood thinners  Please verify with the prescribing physician  Examples include Coumadin, Plavix, Xarelto, Eliquis, Pradaxa, etc      Please check with your family physician if you are taking the following medications: Aspirin or any Aspirin containing medication, Gingko biloba, Ginseng, Feverfew, and/or St  Casey’s Wort  We suggest stopping these for 3 days  The night before and the day of your surgery, wash from your neck to groin with chlorhexidine soap  This soap is available at most retail pharmacies under such brand names as Hibiclens, Endure or Aplicare  Pre-admission testing Required: YES      NO x           Other Clearances/ Additional Testing: NONE     NOTHING TO EAT OR DRINK AFTER MIDNIGHT PRIOR TO SURGERY  Take ONE FLEET ENEMA one hour prior to leaving for the hospital      Please do not hesitate to call our office with any questions regarding your surgery  POSTOPERATIVE INSTRUCTIONS FOR ANAL & RECTAL SURGERY      Please call our office to schedule your 6 week post operative visit  Your recovery following surgery will continue at home  This post-op instruction sheet is designed to help in that process      CARE OF YOUR INCISION:  Wash anal area after bowel movements with plain warm water   Use a sitz bath or sit in a tub of warm water several times a day  Sutures need not be removed  They will dissolve  BASIC INSTRUCTIONS:  Maintain a regular diet  Drink plenty of fluids  Take Metamucil ONCE daily  Call the physician if you have a fever, vomiting, or chills  Bowel movements should occur within 48 hours after surgery  If not, 2 Tablespoons of MINERAL OIL or MILK OF MAGNESIA can be taken up to 3 times a day until the first bowel movement  Avoid straining with bowel movements  Please do not hesitate to call our office to speak to one of our nurses or doctors if you have any questions regarding your surgery or recovery  Monserrat March

## 2023-01-20 NOTE — Clinical Note
Please schedule colonoscopy followed by exam under anesthesia with excision and fulguration of anal condyloma

## 2023-01-20 NOTE — TELEPHONE ENCOUNTER
OV DB condyloma, first colonoscopy, BMI 24    Scheduled 3/6/2023 at [facility] AND [facility] w/DB    Paperwork handed to pt     Additional Information  • Negative: Is this a new patient under the age of 48? • Negative: Is this a patient over the age of 76 that has never had a colonoscopy? • Negative: Is this patient over the age of [de-identified] with a history of cancer and/or polyps? • Negative: Has the patient had a colonoscopy within the last year and when was it? • Negative: Does the patient have a family history of colon or rectal cancer? • Negative: Does the patient experience chest pain or shortness of breath when climbing stairs? • Negative: Does the patient require oxygen support? • Negative: Has the patient had a cardiac procedure within the last year? • Negative: Has the patient had coronary artery disease (CAD) with stents or myocardial infarction in the last three months? • Negative: Does the patient have ongoing cardiac ischemia, severe valve dysfunction, or severe reduction of ejection fraction? • Negative: Has the patient had a stroke or blood clots? • Negative: Has the patient had a transient ischemic attack (TIA) or cerebrovascular accident (CVA) in the last three months? • Negative: Is the patient currently on any blood thinners such as Coumadin, Plavix, Eliquis, Pradaxa, Xarelto, etc?  (Check the patient's current medication list and document reason for taking medication)  • Negative: Has the patient had a knee or hip replacement within the last 6 months that required antibiotic coverage prior to the procedure? • Negative: Advised Patient - Initial Assessment  1  Patient advised that our office requires 72 hours notice for a cancellation of a procedure to avoid incurring a missed appointment fee  2  Asked patient to please contact insurance company to ask how they will be processing the individual claim for the procedure    3  Advised patient that she is welcome to see the doctor in the office prior to the procedure  4  Advised patient to be at the location of the procedure at 30 minutes prior to the scheduled time      Protocols used: PETRONA AMB CRC COLONOSCOPY SCHEDULING

## 2023-01-20 NOTE — LETTER
[address] [address]        ------------------------------------------------------------------------------------------------------------  FLEXIBLE COLONOSCOPY INSTRUCTIONS  PLEASE NOTE    AS OF JUNE 1, 2014, OUR OFFICE REQUIRES 72 HOURS NOTICE OF CANCELLATION/RESCHEDULE OF A PROCEDURE TO AVOID INCURRING A MISSED APPOINTMENT FEE  Your Colonoscopy Procedure has been scheduled at:[facility]  [address] , [facility], [address] [place] [address]     The Date of your Procedure is: 3/6/2023  The hospital facility will contact you the evening prior to your scheduled procedure with your arrival time  Use the bowel preparation as directed  Check with your family doctor if you are taking a blood thinner (Coumadin, Plavix, Xarelto, Pradaxa, Gingko biloba, Ginseng, Feverfew, St  Casey's Wort)  We suggest stopping these for 3 days  Special instructions may be needed if you are taking aspirin or any aspirin-containing medication  Check with your family physician  If you are on DIABETIC MEDICATION (tablets or insulin) your doctor may make changes in your preparation  Take all medications usual unless otherwise instructed  As always, if you have any questions or concerns, feel free to call the office  Our  staff will be happy to connect you with one of the nurses to answer any questions or address any concerns you have regarding your procedure  Do not wear any jewelry the day of your procedure including wedding rings  Arrangements must be made for a responsible party to drive you home from the procedure  If you do not have a responsible family member or friend to drive you home, the procedure will not be done  Vast or a taxi is not acceptable  It is important you notify our office of any insurance changes prior to your procedure and, if necessary, supply us with referrals from your primary care physician                  COLONOSCOPY PREPARATION INSTRUCTIONS    Purchase (prescription not required):  · 238 gram bottle of Miralax® (Glycolax®)  · 4 Dulcolax® (Bisacodyl) Laxative Tablets  · 64 oz  bottle of Gatorade® or your preference of a non-carbonated clear liquid - NOT RED OR PURPLE     One Day Prior to Colonoscopy Procedure  · Nothing to eat the day before your procedure, only clear liquids  · It is important that you drink plenty of clear liquids throughout the day to prevent dehydration  Clear Liquids include:  o Water/Iced Tea/Lemonade/Gatorade®/Black Coffee or tea (no milk or creamers  o Soft drinks: orange, ginger ale, cola, Pepsi®, Sprite®, 7Up®  o Anand-Aid® (lemonade or orange flavors only)  o Strained fruit juices without pulp such as apple, white grape, white cranberry  o Jell-O®, lemon, lime or orange (no fruit or toppings)  o Popsicles, [place] Ice (No Ice Cream, sherbets, or fruit bars)  o Chicken or beef bouillon/broth  DO NOT EAT OR DRINK ANYTHING RED OR PURPLE  DO NOT DRINK ANY ALCOHOLIC BEVERAGES  DIABETIC PATIENTS: Consult your physician    At 4:00 pm, take (2) Dulcolax® (Bisacodyl) Laxative Tablets  Swallow the tablets whole with an 8 oz  glass of water  At 8:00 pm, take the additional (2) Dulcolax® (Bisacodyl) Laxative Tablets with 8 oz  of water  The package may direct you not to exceed (2) tablets at any time but for the purpose of this examination, you should take (4) total     Mix the 238 gm of Miralax® in 64 oz  of Gatorade® and shake the solution until the Miralax® is dissolved  You will drink half (32 oz) of this solution this evening, beginning between 4 and 6 o'clock  Drink 8 oz glassfuls at your own pace  It may take several hours to drink the solution  Remember to stay close to toilet facilities  DAY OF COLONOSCOPY PROCEDURE    Five (5) hours before your procedure, drink the other half (32 oz) of the Miralax®/Gatorade® mixture within a two (2) hour period   This may require you to get up very early if you are scheduled for an early procedure  NOTHING IS TO BE TAKEN BY MOUTH 3 HOURS PRIOR TO PROCEDURE  If you use an inhaler, please bring it with you to your procedure

## 2023-01-20 NOTE — ASSESSMENT & PLAN NOTE
Patient presents for evaluation of perianal concerns  She notes that she has had HPV disease in the past but is worsened recently  She had topical agents applied externally but notes on examination by her primary care physician she had internal gross as well  Examination shows internal and external circumferential anal condyloma  We discussed the nature and history of HPV related disease  In terms of her anus I have recommended exam under anesthesia with excision and fulguration of anal condyloma  Risks of anal surgery, including but not limited to pain, bleeding, failure to heal properly, fecal incontinence, persistent or recurrent anal disease, infection, fistula, abscess were reviewed  Questions were answered  As she is over the age of 40, I have recommended colorectal cancer screening in the form of colonoscopy  I will perform this prior to surgical procedure in order to ensure that there is no more concerning proximal pathology  Risks and benefits of colonoscopy reviewed with patient to include, but not be limited to: anesthesia, bleeding, missed lesion and perforation requiring surgery  Patient consents to procedure

## 2023-03-06 ENCOUNTER — ANESTHESIA (OUTPATIENT)
Dept: GASTROENTEROLOGY | Facility: HOSPITAL | Age: 48
End: 2023-03-06

## 2023-03-06 ENCOUNTER — HOSPITAL ENCOUNTER (OUTPATIENT)
Dept: GASTROENTEROLOGY | Facility: HOSPITAL | Age: 48
Setting detail: OUTPATIENT SURGERY
Discharge: HOME | End: 2023-03-06
Attending: COLON & RECTAL SURGERY | Admitting: COLON & RECTAL SURGERY

## 2023-03-06 ENCOUNTER — ANESTHESIA EVENT (OUTPATIENT)
Dept: GASTROENTEROLOGY | Facility: HOSPITAL | Age: 48
End: 2023-03-06

## 2023-03-06 VITALS
SYSTOLIC BLOOD PRESSURE: 117 MMHG | HEART RATE: 81 BPM | DIASTOLIC BLOOD PRESSURE: 56 MMHG | TEMPERATURE: 98.2 F | RESPIRATION RATE: 18 BRPM | OXYGEN SATURATION: 96 %

## 2023-03-06 DIAGNOSIS — Z12.11 SCREENING FOR COLON CANCER: ICD-10-CM

## 2023-03-06 RX ORDER — NEEDLES, DISPOSABLE 25GX5/8"
NEEDLE, DISPOSABLE MISCELLANEOUS
COMMUNITY
Start: 2022-12-31 | End: 2023-03-15

## 2023-03-06 RX ORDER — PROPOFOL 10 MG/ML
INJECTION, EMULSION INTRAVENOUS AS NEEDED
Status: DISCONTINUED | OUTPATIENT
Start: 2023-03-06 | End: 2023-03-06

## 2023-03-06 RX ORDER — SYRINGE WITH NEEDLE, 1 ML 25GX5/8"
SYRINGE, EMPTY DISPOSABLE MISCELLANEOUS
COMMUNITY
Start: 2022-12-28

## 2023-03-06 RX ORDER — SODIUM CHLORIDE 9 MG/ML
INJECTION, SOLUTION INTRAVENOUS CONTINUOUS PRN
Status: DISCONTINUED | OUTPATIENT
Start: 2023-03-06 | End: 2023-03-06

## 2023-03-06 RX ORDER — LIDOCAINE HYDROCHLORIDE 10 MG/ML
INJECTION, SOLUTION EPIDURAL; INFILTRATION; INTRACAUDAL; PERINEURAL AS NEEDED
Status: DISCONTINUED | OUTPATIENT
Start: 2023-03-06 | End: 2023-03-06

## 2023-03-06 RX ORDER — SODIUM CHLORIDE 9 MG/ML
125 INJECTION, SOLUTION INTRAVENOUS CONTINUOUS
Status: CANCELLED | OUTPATIENT
Start: 2023-03-06

## 2023-03-06 RX ORDER — ESTRADIOL VALERATE 20 MG/ML
INJECTION INTRAMUSCULAR
COMMUNITY
Start: 2022-12-30

## 2023-03-06 RX ORDER — EMTRICITABINE AND TENOFOVIR ALAFENAMIDE 200; 25 MG/1; MG/1
TABLET ORAL DAILY
COMMUNITY
Start: 2023-02-21

## 2023-03-06 RX ORDER — SODIUM CHLORIDE 9 MG/ML
50 INJECTION, SOLUTION INTRAVENOUS CONTINUOUS
Status: CANCELLED | OUTPATIENT
Start: 2023-03-06

## 2023-03-06 RX ORDER — PROPOFOL 10 MG/ML
INJECTION, EMULSION INTRAVENOUS CONTINUOUS PRN
Status: DISCONTINUED | OUTPATIENT
Start: 2023-03-06 | End: 2023-03-06

## 2023-03-06 RX ADMIN — PROPOFOL 100 MG: 10 INJECTION, EMULSION INTRAVENOUS at 10:40

## 2023-03-06 RX ADMIN — PROPOFOL 40 MG: 10 INJECTION, EMULSION INTRAVENOUS at 10:44

## 2023-03-06 RX ADMIN — PROPOFOL 150 MCG/KG/MIN: 10 INJECTION, EMULSION INTRAVENOUS at 10:42

## 2023-03-06 RX ADMIN — SODIUM CHLORIDE: 0.9 INJECTION, SOLUTION INTRAVENOUS at 10:40

## 2023-03-06 RX ADMIN — PROPOFOL 50 MG: 10 INJECTION, EMULSION INTRAVENOUS at 10:55

## 2023-03-06 RX ADMIN — LIDOCAINE HYDROCHLORIDE 50 MG: 10 INJECTION, SOLUTION EPIDURAL; INFILTRATION; INTRACAUDAL; PERINEURAL at 10:42

## 2023-03-06 RX ADMIN — PROPOFOL 40 MG: 10 INJECTION, EMULSION INTRAVENOUS at 10:47

## 2023-03-06 NOTE — ANESTHESIA POSTPROCEDURE EVALUATION
Post-Op Assessment Note    CV Status:  Stable  Pain Score: 0    Pain management: adequate     Mental Status:  Awake and alert   Hydration Status:  Euvolemic and stable   PONV Controlled:  Controlled   Airway Patency:  Patent      Post Op Vitals Reviewed: Yes      Staff: CRNA         No notable events documented      /65 (03/06/23 1059)    Temp 98 2 °F (36 8 °C) (03/06/23 1059)    Pulse 92 (03/06/23 1059)   Resp 18 (03/06/23 1059)    SpO2 96 % (03/06/23 1059)

## 2023-03-06 NOTE — H&P
History and Physical   Colon and Rectal Surgery   [facility] Perez 56 y o  adult MRN: [id number]  Unit/Bed#:  Encounter: [id number]  03/06/23   @NOW    No chief complaint on file  History of Present Illness   HPI:  Diana Harp is a 56 y o  adult who presents for age-appropriate screening colonoscopy      Historical Information   No past medical history on file    Past Surgical History:   Procedure Laterality Date   • ORIF HIP FRACTURE Left 7/7/2022    Procedure: OPEN REDUCTION W/ INTERNAL FIXATION (ORIF) LEFT FEMORAL NECK FRACTURE;  Surgeon: Mercedez Dunaway MD;  Location: [facility];  Service: Orthopedics       Meds/Allergies     (Not in a hospital admission)        Current Outpatient Medications:   •  bicalutamide (CASODEX) 50 mg tablet, Take 50 mg by mouth daily, Disp: , Rfl:   •  enoxaparin (LOVENOX) 30 mg/0 3 mL, Inject 0 3 mL (30 mg total) under the skin every 12 (twelve) hours for 16 days, Disp: 18 mL, Rfl: 0  •  estradiol (ESTRACE) 2 MG tablet, Take 2 mg by mouth 4 (four) times a day, Disp: , Rfl:   •  finasteride (PROSCAR) 5 mg tablet, Take 5 mg by mouth daily, Disp: , Rfl:   •  Multiple Vitamins-Minerals (multivitamin with minerals) tablet, Take 1 tablet by mouth daily, Disp: , Rfl:   •  podofilox (CONDYLOX) 0 5 % gel, Apply topically 2 (two) times a day, Disp: , Rfl:   •  progesterone 200 mg vaginal suppository, Insert 1 suppository (200 mg total) into the vagina in the morning, Disp: 30 suppository, Rfl: 0  •  traMADol (Ultram) 50 mg tablet, Take 1 tablet (50 mg total) by mouth every 8 (eight) hours as needed for moderate pain (Patient not taking: Reported on 8/26/2022), Disp: 30 tablet, Rfl: 0  •  traZODone (DESYREL) 50 mg tablet, Take 1 tablet (50 mg total) by mouth daily at bedtime, Disp: 30 tablet, Rfl: 0    Allergies   Allergen Reactions   • Penicillins Rash and Fever   • Sulfa Antibiotics Rash and Fever         Social History   Social History     Substance and Sexual Activity   Alcohol Use Not Currently     Social History     Substance and Sexual Activity   Drug Use Never     Social History     Tobacco Use   Smoking Status Every Day   • Types: Cigarettes   Smokeless Tobacco Never         Family History: No family history on file  Objective     Current Vitals:      No intake or output data in the 24 hours ending 03/06/23 4292    Physical Exam:  General:  Resting comfortably in bed   Eyes:Sclera anicteric  ENT: Trachea midline  Pulm:  Symmetric chest raise  No respiratory Distress  CV:  Regular on monitor  Abdomen:  Soft NT ND  Extremities:  No clubbing/ cyanosis/ edema    Lab Results: I have personally reviewed pertinent lab results  Imaging: I have personally reviewed pertinent reports  ASSESSMENT:  Doug Mustafa is a 56 y o  adult who presents for outpatient colonoscopy  PLAN:  For colonoscopy    Risks/ Benefits reviewed to include but not limited to anesthesia, bleeding, missed lesions, and colonoscopic perforation requiring surgery

## 2023-03-06 NOTE — ANESTHESIA PREPROCEDURE EVALUATION
Procedure:  COLONOSCOPY    Relevant Problems   NEURO/PSYCH   (+) PTSD (post-traumatic stress disorder)      PULMONARY   (+) DISHA (obstructive sleep apnea)   (+) Smoking      Digestive   (+) Anal condyloma      Musculoskeletal and Integument   (+) Closed displaced fracture of left femoral neck (HCC)      Other   (+) Ambulatory dysfunction   (+) Insomnia   (+) 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   (+) Marijuana use   (+) Pre-operative examination   (+) S/P musculoskeletal system surgery        Physical Exam    Airway    Mallampati score: II  TM Distance: >3 FB  Neck ROM: full     Dental       Cardiovascular  Cardiovascular exam normal    Pulmonary  Pulmonary exam normal     Other Findings        Anesthesia Plan  ASA Score- 2     Anesthesia Type- IV sedation with anesthesia with ASA Monitors  Additional Monitors:   Airway Plan:           Plan Factors-Exercise tolerance (METS): >4 METS  Chart reviewed  Existing labs reviewed  Patient is a current smoker  Patient instructed to abstain from smoking on day of procedure  Patient smoked on day of surgery  Induction- intravenous  Postoperative Plan-     Informed Consent- Anesthetic plan and risks discussed with patient  I personally reviewed this patient with the CRNA  Discussed and agreed on the Anesthesia Plan with the CRNA  Wojciech Simental

## 2023-03-15 NOTE — PRE-PROCEDURE INSTRUCTIONS
Pre-Surgery Instructions:   Medication Instructions   • bicalutamide (CASODEX) 50 mg tablet Hold day of surgery  • Descovy 200-25 MG tablet Hold day of surgery  • estradiol valerate (DELESTROGEN) 20 MG/ML injection Hold day of surgery  • finasteride (PROSCAR) 5 mg tablet Take night before surgery   • Multiple Vitamins-Minerals (multivitamin with minerals) tablet Stop taking 7 days prior to surgery  • progesterone 200 mg vaginal suppository Hold day of surgery  Pt denies fever, sob, sore throat and cough  Pt verbalized understanding of shower and med instructions  Pt instructed to stop nsaids and supplements one week prior to surgery

## 2023-03-21 ENCOUNTER — ANESTHESIA EVENT (OUTPATIENT)
Dept: PERIOP | Facility: HOSPITAL | Age: 48
End: 2023-03-21

## 2023-03-21 NOTE — ANESTHESIA PREPROCEDURE EVALUATION
Procedure:  EXCISION CONDYLOMA ANAL/RECTAL (Anus)  EXAM UNDER ANESTHESIA (EUA) (Anus)    Relevant Problems   NEURO/PSYCH   (+) PTSD (post-traumatic stress disorder)      PULMONARY   (+) DISHA (obstructive sleep apnea)   (+) Smoking      Musculoskeletal and Integument   (+) Closed displaced fracture of left femoral neck (HCC)      Other   (+) Ambulatory dysfunction   (+) S/P musculoskeletal system surgery     Physical Exam    Airway    Mallampati score: I  TM Distance: >3 FB  Neck ROM: full     Dental       Cardiovascular      Pulmonary      Other Findings        Anesthesia Plan  ASA Score- 2     Anesthesia Type- general with ASA Monitors  Additional Monitors:   Airway Plan: ETT  Plan Factors-    Chart reviewed  EKG reviewed  Existing labs reviewed  Patient summary reviewed  Patient is a current smoker  Patient instructed to abstain from smoking on day of procedure  Patient smoked on day of surgery  Induction- intravenous  Postoperative Plan- Plan for postoperative opioid use  Planned trial extubation    Informed Consent- Anesthetic plan and risks discussed with patient  I personally reviewed this patient with the CRNA  Discussed and agreed on the Anesthesia Plan with the CRNA  Karrie Nissen          Component Ref Range & Units 7/14/22 0705 7/10/22 0631 7/9/22 0511 7/8/22 1233 7/7/22 0008   WBC 4 31 - 10 16 Thousand/uL 9 48  9 95  10 39 High   9 14  18 87 High     RBC 3 88 - 5 12 Million/uL 3 58 Low   3 40 Low   3 34 Low   2 82 Low   3 96    Hemoglobin 12 0 - 15 4 g/dL 10 8 Low   10 2 Low   10 3 Low   8 9 Low  CM  12 4    Hematocrit 36 5 - 46 1 % 32 9 Low   31 3 Low   31 6 Low   26 5 Low   38 2    MCV 82 - 98 fL 92  92  95  94  97    MCH 26 8 - 34 3 pg 30 2  30 0  30 8  31 6  31 3    MCHC 31 4 - 37 4 g/dL 32 8  32 6  32 6  33 6  32 5    RDW 11 6 - 15 1 % 12 4  12 9  13 0  12 6  13 0    Platelets 395 - 021 Thousands/uL 379  241  243  200  284    MPV 8 9 - 12 7 fL 9 3  9 4  9 6  9 4  9 3 Component Ref Range & Units 7/14/22 0705 7/10/22 0631 7/9/22 0511 7/8/22 1233 7/7/22 0008   Sodium 137 - 147 mmol/L 136 Low   136 R  137 R  136 R  136 R    Potassium 3 6 - 5 0 mmol/L 4 2  3 9 R  3 7 R  4 3 R  3 8 R    Chloride 97 - 108 mmol/L 103  103 R  105 R  102 R  106 R    CO2 22 - 30 mmol/L 24  30 R  29 R  23 R  25 R    ANION GAP 5 - 14 mmol/L 9  3 Low  R  3 Low  R  11 R  5 R    BUN 5 - 25 mg/dL 14  6  6  7  17    Creatinine 0 70 - 1 20 mg/dL 0 77  0 68 R, CM  0 75 R, CM  0 58 Low  R, CM  0 93 R, CM    Comment: Standardized to IDMS reference method   Glucose, Fasting 70 - 99 mg/dL 95        Comment: Specimen collection should occur prior to Sulfasalazine administration due to the potential for falsely depressed results  Specimen collection should occur prior to Sulfapyridine administration due to the potential for falsely elevated results  Calcium 8 4 - 10 2 mg/dL 8 6  8 6 R  8 0 Low  R  7 0 Low  R  8 8 R    AST 17 - 36 U/L 30        Comment: Specimen collection should occur prior to Sulfasalazine administration due to the potential for falsely depressed results  ALT 12 - 78 U/L 31        Comment: Specimen collection should occur prior to Sulfasalazine administration due to the potential for falsely depressed results      Alkaline Phosphatase 43 - 122 U/L 59        Total Protein 5 9 - 8 4 g/dL 7 0        Albumin 3 0 - 5 2 g/dL 3 9        Total Bilirubin <1 30 mg/dL 0 28

## 2023-03-22 ENCOUNTER — ANESTHESIA (OUTPATIENT)
Dept: PERIOP | Facility: HOSPITAL | Age: 48
End: 2023-03-22

## 2023-03-22 ENCOUNTER — HOSPITAL ENCOUNTER (OUTPATIENT)
Facility: HOSPITAL | Age: 48
Setting detail: OUTPATIENT SURGERY
Discharge: HOME | End: 2023-03-22
Attending: COLON & RECTAL SURGERY | Admitting: COLON & RECTAL SURGERY

## 2023-03-22 VITALS
TEMPERATURE: 97.5 F | WEIGHT: 168 LBS | HEART RATE: 67 BPM | OXYGEN SATURATION: 98 % | SYSTOLIC BLOOD PRESSURE: 131 MMHG | HEIGHT: 71 IN | DIASTOLIC BLOOD PRESSURE: 85 MMHG | BODY MASS INDEX: 23.52 KG/M2 | RESPIRATION RATE: 16 BRPM

## 2023-03-22 DIAGNOSIS — A63.0 ANAL CONDYLOMA: Primary | ICD-10-CM

## 2023-03-22 RX ORDER — PROPOFOL 10 MG/ML
INJECTION, EMULSION INTRAVENOUS AS NEEDED
Status: DISCONTINUED | OUTPATIENT
Start: 2023-03-22 | End: 2023-03-22

## 2023-03-22 RX ORDER — SUCCINYLCHOLINE/SOD CL,ISO/PF 100 MG/5ML
SYRINGE (ML) INTRAVENOUS AS NEEDED
Status: DISCONTINUED | OUTPATIENT
Start: 2023-03-22 | End: 2023-03-22

## 2023-03-22 RX ORDER — DEXAMETHASONE SODIUM PHOSPHATE 10 MG/ML
INJECTION, SOLUTION INTRAMUSCULAR; INTRAVENOUS AS NEEDED
Status: DISCONTINUED | OUTPATIENT
Start: 2023-03-22 | End: 2023-03-22

## 2023-03-22 RX ORDER — FENTANYL CITRATE/PF 50 MCG/ML
50 SYRINGE (ML) INJECTION
Status: DISCONTINUED | OUTPATIENT
Start: 2023-03-22 | End: 2023-03-22 | Stop reason: HOSPADM

## 2023-03-22 RX ORDER — OXYCODONE HYDROCHLORIDE 5 MG/1
5 TABLET ORAL EVERY 4 HOURS PRN
Status: DISCONTINUED | OUTPATIENT
Start: 2023-03-22 | End: 2023-03-22 | Stop reason: HOSPADM

## 2023-03-22 RX ORDER — LIDOCAINE HYDROCHLORIDE 10 MG/ML
INJECTION, SOLUTION EPIDURAL; INFILTRATION; INTRACAUDAL; PERINEURAL AS NEEDED
Status: DISCONTINUED | OUTPATIENT
Start: 2023-03-22 | End: 2023-03-22

## 2023-03-22 RX ORDER — BUPIVACAINE HYDROCHLORIDE AND EPINEPHRINE 2.5; 5 MG/ML; UG/ML
INJECTION, SOLUTION EPIDURAL; INFILTRATION; INTRACAUDAL; PERINEURAL AS NEEDED
Status: DISCONTINUED | OUTPATIENT
Start: 2023-03-22 | End: 2023-03-22 | Stop reason: HOSPADM

## 2023-03-22 RX ORDER — ALBUTEROL SULFATE 90 UG/1
AEROSOL, METERED RESPIRATORY (INHALATION) AS NEEDED
Status: DISCONTINUED | OUTPATIENT
Start: 2023-03-22 | End: 2023-03-22

## 2023-03-22 RX ORDER — KETOROLAC TROMETHAMINE 30 MG/ML
INJECTION, SOLUTION INTRAMUSCULAR; INTRAVENOUS AS NEEDED
Status: DISCONTINUED | OUTPATIENT
Start: 2023-03-22 | End: 2023-03-22

## 2023-03-22 RX ORDER — SODIUM CHLORIDE, SODIUM LACTATE, POTASSIUM CHLORIDE, CALCIUM CHLORIDE 600; 310; 30; 20 MG/100ML; MG/100ML; MG/100ML; MG/100ML
INJECTION, SOLUTION INTRAVENOUS CONTINUOUS PRN
Status: DISCONTINUED | OUTPATIENT
Start: 2023-03-22 | End: 2023-03-22

## 2023-03-22 RX ORDER — ONDANSETRON 2 MG/ML
INJECTION INTRAMUSCULAR; INTRAVENOUS AS NEEDED
Status: DISCONTINUED | OUTPATIENT
Start: 2023-03-22 | End: 2023-03-22

## 2023-03-22 RX ORDER — OXYCODONE HYDROCHLORIDE 5 MG/1
5 TABLET ORAL EVERY 4 HOURS PRN
Qty: 20 TABLET | Refills: 0 | Status: SHIPPED | OUTPATIENT
Start: 2023-03-22 | End: 2023-04-01

## 2023-03-22 RX ORDER — KETAMINE HCL IN NACL, ISO-OSM 50 MG/5 ML
SYRINGE (ML) INTRAVENOUS AS NEEDED
Status: DISCONTINUED | OUTPATIENT
Start: 2023-03-22 | End: 2023-03-22

## 2023-03-22 RX ORDER — FENTANYL CITRATE 50 UG/ML
INJECTION, SOLUTION INTRAMUSCULAR; INTRAVENOUS AS NEEDED
Status: DISCONTINUED | OUTPATIENT
Start: 2023-03-22 | End: 2023-03-22

## 2023-03-22 RX ORDER — MIDAZOLAM HYDROCHLORIDE 2 MG/2ML
INJECTION, SOLUTION INTRAMUSCULAR; INTRAVENOUS AS NEEDED
Status: DISCONTINUED | OUTPATIENT
Start: 2023-03-22 | End: 2023-03-22

## 2023-03-22 RX ADMIN — DEXAMETHASONE SODIUM PHOSPHATE 5 MG: 10 INJECTION, SOLUTION INTRAMUSCULAR; INTRAVENOUS at 08:44

## 2023-03-22 RX ADMIN — FENTANYL CITRATE 100 MCG: 50 INJECTION INTRAMUSCULAR; INTRAVENOUS at 08:30

## 2023-03-22 RX ADMIN — PROPOFOL 200 MG: 10 INJECTION, EMULSION INTRAVENOUS at 08:30

## 2023-03-22 RX ADMIN — Medication 20 MCG: at 08:43

## 2023-03-22 RX ADMIN — LIDOCAINE HYDROCHLORIDE 5 ML: 10 INJECTION, SOLUTION EPIDURAL; INFILTRATION; INTRACAUDAL; PERINEURAL at 08:30

## 2023-03-22 RX ADMIN — ALBUTEROL SULFATE 2 PUFF: 90 AEROSOL, METERED RESPIRATORY (INHALATION) at 08:20

## 2023-03-22 RX ADMIN — Medication 100 MG: at 08:31

## 2023-03-22 RX ADMIN — KETOROLAC TROMETHAMINE 30 MG: 30 INJECTION, SOLUTION INTRAMUSCULAR; INTRAVENOUS at 08:51

## 2023-03-22 RX ADMIN — FENTANYL CITRATE 100 MCG: 50 INJECTION INTRAMUSCULAR; INTRAVENOUS at 08:45

## 2023-03-22 RX ADMIN — SODIUM CHLORIDE, SODIUM LACTATE, POTASSIUM CHLORIDE, AND CALCIUM CHLORIDE: .6; .31; .03; .02 INJECTION, SOLUTION INTRAVENOUS at 07:42

## 2023-03-22 RX ADMIN — PROPOFOL 30 MG: 10 INJECTION, EMULSION INTRAVENOUS at 08:45

## 2023-03-22 RX ADMIN — MIDAZOLAM 2 MG: 1 INJECTION INTRAMUSCULAR; INTRAVENOUS at 08:20

## 2023-03-22 RX ADMIN — ONDANSETRON 4 MG: 2 INJECTION INTRAMUSCULAR; INTRAVENOUS at 08:51

## 2023-03-22 RX ADMIN — Medication 30 MCG: at 08:29

## 2023-03-22 NOTE — OP NOTE
OPERATIVE REPORT  PATIENT NAME: Consuelo Catherine    :  1975  MRN: [id number]  Pt Location: [facility] OR ROOM 07    SURGERY DATE: 3/22/2023    Surgeon(s) and Role:     * Albaro Mckeon MD - Primary     * Ochoa Santillan MD - Assisting    Preop Diagnosis:  Condyloma [A63 0]    Post-Op Diagnosis Codes:     * Condyloma [A63 0]    Procedure(s):  EXCISION CONDYLOMA ANAL/RECTAL  EXAM UNDER ANESTHESIA (EUA)    Specimen(s):  * No specimens in log *    Estimated Blood Loss:   Minimal    Drains:  * No LDAs found *    Anesthesia Type:   General    Operative Indications:  Condyloma [A63 0]      Operative Findings:  Scattered condyloma of the anal canal and anal margin    Complications:   None    Procedure and Technique:  After preoperative identification in the preoperative holding area, the patient was taken the operating room placed in the supine position  Following introduction of general anesthesia the patient was in place in the prone jackknife position with buttocks taped apart  Patient was prepped and draped in usual sterile fashion  Timeout was undertaken and procedure begun  Examination was performed  The anal margin had multiple small verrucous lesions no larger than 2 mm in all 4 quadrants  Digital rectal exam and anoscopy confirmed same for the anal canal   Four-quadrant fulguration was performed  Repeat examination revealed no remnant condyloma  Hemostasis was achieved with Bovie cautery  Local field block was placed using bupivacaine with Exparel  Patient was awakened from anesthesia and transferred to recovery room in stable condition       I was present for the entire procedure    Patient Disposition:  PACU         SIGNATURE: Albaro Mckeon MD  DATE: 2023  TIME: 8:50 AM

## 2023-03-22 NOTE — H&P
History and Physical   Colon and Rectal Surgery   Kath Perez 56 y o  adult MRN: [id number]  Unit/Bed#: OR [facility] Encounter: [id number]  23   @NOW    No chief complaint on file  History of Present Illness   HPI:  Tana Alexandra is a 56 y o  adult who presents for surgical treatment of anal condyloma      Historical Information   Past Medical History:   Diagnosis Date   • HPV (human papilloma virus) infection    • 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    • Sleep apnea      Past Surgical History:   Procedure Laterality Date   • ORIF HIP FRACTURE Left 2022    Procedure: OPEN REDUCTION W/ INTERNAL FIXATION (ORIF) LEFT FEMORAL NECK FRACTURE;  Surgeon: Mirtha Sharp MD;  Location: [facility];  Service: Orthopedics       Meds/Allergies     Medications Prior to Admission   Medication   • bicalutamide (CASODEX) 50 mg tablet   • Descovy 200-25 MG tablet   • estradiol valerate (DELESTROGEN) 20 MG/ML injection   • finasteride (PROSCAR) 5 mg tablet   • Multiple Vitamins-Minerals (multivitamin with minerals) tablet   • progesterone 200 mg vaginal suppository   • B-D 3CC LUER-DAPHNIE SYR 83FE5-5/2 18G X 1-1/2" 3 ML MISC       No current facility-administered medications for this encounter  Allergies   Allergen Reactions   • Penicillins Rash and Fever   • Sulfa Antibiotics Rash and Fever         Social History   Social History     Substance and Sexual Activity   Alcohol Use Yes   • Alcohol/week: 4 0 standard drinks   • Types: 2 Cans of beer, 2 Shots of liquor per week    Comment: social     Social History     Substance and Sexual Activity   Drug Use Yes   • Types: Marijuana    Comment: occassional/ 4 days ago     Social History     Tobacco Use   Smoking Status Former   • Packs/day: 1 00   • Types: Cigarettes   • Quit date: 3/14/2023   • Years since quittin 0   Smokeless Tobacco Never         Family History: History reviewed  No pertinent family history        Objective     Current Vitals:      No intake or output data in the 24 hours ending 03/22/23 0759    Physical Exam:  General:  Resting comfortably in bed   Eyes:Sclera anicteric  ENT: Trachea midline  Pulm:  Symmetric chest raise  No respiratory Distress  CV:  Regular on monitor  Abdomen:  Soft NT ND  Extremities:  No clubbing/ cyanosis/ edema    Lab Results: I have personally reviewed pertinent lab results  Imaging: I have personally reviewed pertinent reports  ASSESSMENT:  Joann Dodson is a 56 y o  adult who presents for surgical treatment of anal condyloma  Plan is for excision fulguration of anal condyloma  Risks of anal surgery, including but not limited to pain, bleeding, failure to heal properly, fecal incontinence, persistent or recurrent anal disease, infection, fistula, abscess were reviewed  Questions were answered

## 2023-03-22 NOTE — DISCHARGE INSTR - AVS FIRST PAGE
May shower or tub bathe beginning today  Ensure you are not getting constipated using Metamucil 1 tablespoon 1-2 times daily with plenty of hydration  Pain medication as prescribed for pain may be combined with Tylenol  May use ibuprofen as well     Call for worsening pain, heavy bleeding, inability to urinate or fever greater than 101 5  4 weeks follow-up Dr Scout Casillas [phone]

## 2023-03-22 NOTE — ANESTHESIA POSTPROCEDURE EVALUATION
Post-Op Assessment Note    CV Status:  Stable  Pain Score: 0    Pain management: adequate     Mental Status:  Alert and awake   Hydration Status:  Euvolemic   PONV Controlled:  Controlled   Airway Patency:  Patent      Post Op Vitals Reviewed: Yes      Staff: Anesthesiologist, CRNA         No notable events documented      BP   136/63   Temp   96 8   Pulse  100   Resp   30   SpO2   97 room air

## 2023-09-22 ENCOUNTER — HOSPITAL ENCOUNTER (EMERGENCY)
Facility: HOSPITAL | Age: 48
End: 2023-09-23
Attending: EMERGENCY MEDICINE
Payer: COMMERCIAL

## 2023-09-22 DIAGNOSIS — F10.929 ALCOHOL INTOXICATION (HCC): ICD-10-CM

## 2023-09-22 DIAGNOSIS — Z00.8 MEDICAL CLEARANCE FOR PSYCHIATRIC ADMISSION: ICD-10-CM

## 2023-09-22 DIAGNOSIS — R45.851 SUICIDAL IDEATIONS: Primary | ICD-10-CM

## 2023-09-22 LAB
AMPHETAMINES SERPL QL SCN: NEGATIVE
BARBITURATES UR QL: NEGATIVE
BENZODIAZ UR QL: NEGATIVE
COCAINE UR QL: NEGATIVE
ETHANOL EXG-MCNC: 0.06 MG/DL
ETHANOL EXG-MCNC: 0.11 MG/DL
ETHANOL EXG-MCNC: 0.18 MG/DL
OPIATES UR QL SCN: NEGATIVE
OXYCODONE+OXYMORPHONE UR QL SCN: NEGATIVE
PCP UR QL: NEGATIVE
THC UR QL: POSITIVE

## 2023-09-22 PROCEDURE — 80307 DRUG TEST PRSMV CHEM ANLYZR: CPT | Performed by: EMERGENCY MEDICINE

## 2023-09-22 PROCEDURE — 99285 EMERGENCY DEPT VISIT HI MDM: CPT | Performed by: EMERGENCY MEDICINE

## 2023-09-22 PROCEDURE — 82075 ASSAY OF BREATH ETHANOL: CPT | Performed by: EMERGENCY MEDICINE

## 2023-09-22 PROCEDURE — 99285 EMERGENCY DEPT VISIT HI MDM: CPT

## 2023-09-22 RX ORDER — ACETAMINOPHEN 325 MG/1
650 TABLET ORAL ONCE
Status: COMPLETED | OUTPATIENT
Start: 2023-09-22 | End: 2023-09-22

## 2023-09-22 RX ORDER — LORAZEPAM 0.5 MG/1
0.5 TABLET ORAL ONCE
Status: COMPLETED | OUTPATIENT
Start: 2023-09-22 | End: 2023-09-22

## 2023-09-22 RX ADMIN — ACETAMINOPHEN 650 MG: 325 TABLET, FILM COATED ORAL at 18:22

## 2023-09-22 RX ADMIN — LORAZEPAM 0.5 MG: 0.5 TABLET ORAL at 15:10

## 2023-09-22 NOTE — ED PROVIDER NOTES
History  Chief Complaint   Patient presents with   • Psychiatric Evaluation     Pt presents to ED from home feeling SI, psych hx.  Pt's plan to jump off a bridge into a river. 52-year-old male to female transgender patient presenting for psychiatric evaluation. Patient reports that her mental health has been slowly deteriorating over the past several weeks. 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 She notes that she has multiple family members that are actively dying which has been a stressor in addition. She has had thoughts about killing herself. She has thought of multiple plans including jumping into a river, jumping off a bridge, and crashing her car. She notes that she does not wish to act upon any of these thoughts that she had a family member who committed suicide and does not want to put anyone through what she went through. She states that she does not feel safe within her own brain at this time. She desires to sign herself in at this point. She does not currently have a psychiatrist or therapist.  Patient does admit to drinking alcohol today which she states she occasionally uses as a crutch for her emotional distress. She has not been drinking daily and has never gone through withdrawal.          Prior to Admission Medications   Prescriptions Last Dose Informant Patient Reported? Taking?    B-D 3CC LUER-DAPHNIE SYR 17IV7-4/2 18G X 1-1/2" 3 ML MISC   Yes No   Sig: USE TO DRAW UP THE DELESTROGEN EVERY 7 DAYS   Descovy 200-25 MG tablet   Yes No   Sig: daily   Multiple Vitamins-Minerals (multivitamin with minerals) tablet  Self Yes No   Sig: Take 1 tablet by mouth daily   bicalutamide (CASODEX) 50 mg tablet   Yes No   Sig: Take 50 mg by mouth daily   estradiol valerate (DELESTROGEN) 20 MG/ML injection   Yes No   Sig: INJECT 0.2 ML INTRAMUSCULARLY EVERY 7 DAYS   finasteride (PROSCAR) 5 mg tablet   Yes No   Sig: Take 5 mg by mouth daily   progesterone 200 mg vaginal suppository   No No   Sig: Insert 1 suppository (200 mg total) into the vagina in the morning      Facility-Administered Medications: None       Past Medical History:   Diagnosis Date   • HPV (human papilloma virus) infection    • 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    • Sleep apnea        Past Surgical History:   Procedure Laterality Date   • ORIF HIP FRACTURE Left 2022    Procedure: OPEN REDUCTION W/ INTERNAL FIXATION (ORIF) LEFT FEMORAL NECK FRACTURE;  Surgeon: Tereso Owens MD;  [facility];  Service: Orthopedics   • RI ANRCT XM SURG REQ ANES GENERAL SPI/EDRL DX N/A 3/22/2023    Procedure: EXAM UNDER ANESTHESIA (EUA); Surgeon: Cam Zhang MD;  Location[facility];  Service: Colorectal   • RI DSTRJ LESION ANUS EXTENSIVE N/A 3/22/2023    Procedure: Quynh Squires ANAL/RECTAL;  Surgeon: Cam Zhang MD;  Location[facility];  Service: Colorectal       History reviewed. No pertinent family history. I have reviewed and agree with the history as documented. E-Cigarette/Vaping   • E-Cigarette Use Current Every Day User      E-Cigarette/Vaping Substances   • Nicotine Yes    • THC No    • CBD No    • Flavoring No    • Other No    • Unknown No      Social History     Tobacco Use   • Smoking status: Former     Packs/day: 1.00     Types: Cigarettes     Quit date: 3/14/2023     Years since quittin.5   • Smokeless tobacco: Never   Vaping Use   • Vaping Use: Every day   • Substances: Nicotine   Substance Use Topics   • Alcohol use: Yes     Alcohol/week: 4.0 standard drinks of alcohol     Types: 2 Cans of beer, 2 Shots of liquor per week     Comment: social   • Drug use: Yes     Types: Marijuana     Comment: occassional/ 4 days ago       Review of Systems   Constitutional: Negative for fever. Respiratory: Negative for shortness of breath. Cardiovascular: Negative for chest pain.    Gastrointestinal: Negative for abdominal pain. Genitourinary: Negative for flank pain. Skin: Negative for rash. Neurological: Negative for light-headedness. Psychiatric/Behavioral: Positive for dysphoric mood and suicidal ideas. All other systems reviewed and are negative. Physical Exam  Physical Exam  Vitals reviewed. Constitutional:       General: She is not in acute distress. Appearance: Normal appearance. She is not ill-appearing. HENT:      Head: Normocephalic and atraumatic. Nose: Nose normal.      Mouth/Throat:      Mouth: Mucous membranes are moist.   Eyes:      Conjunctiva/sclera: Conjunctivae normal.   Cardiovascular:      Rate and Rhythm: Normal rate. Pulmonary:      Effort: Pulmonary effort is normal.   Abdominal:      General: There is no distension. Musculoskeletal:         General: Normal range of motion. Cervical back: Normal range of motion and neck supple. Skin:     General: Skin is warm and dry. Neurological:      General: No focal deficit present. Mental Status: She is alert and oriented to person, place, and time. Psychiatric:         Attention and Perception: Attention normal.         Mood and Affect: Affect is tearful. Speech: Speech normal.         Behavior: Behavior normal. Behavior is cooperative. Thought Content: Thought content includes suicidal ideation. Thought content does not include homicidal ideation. Thought content includes suicidal plan.          Vital Signs  ED Triage Vitals [09/22/23 1406]   Temperature Pulse Respirations Blood Pressure SpO2   97.5 °F (36.4 °C) 88 16 130/79 100 %      Temp Source Heart Rate Source Patient Position - Orthostatic VS BP Location FiO2 (%)   Oral -- -- -- --      Pain Score       --           Vitals:    09/22/23 1406   BP: 130/79   Pulse: 88         Visual Acuity      ED Medications  Medications   LORazepam (ATIVAN) tablet 0.5 mg (0.5 mg Oral Given 9/22/23 1510)       Diagnostic Studies  Results Reviewed Procedure Component Value Units Date/Time    POCT alcohol breath test [[id number]]  (Abnormal) Resulted: 09/22/23 1410    Lab Status: Final result Updated: 09/22/23 1411     EXTBreath Alcohol 0.182    Rapid drug screen, urine [[id number]]     Lab Status: No result Specimen: Urine                  No orders to display              Procedures  Procedures         ED Course                                             Medical Decision Making  69-year-old male to female transgender patient presenting for suicidal ideation. Patient desiring inpatient treatment at this time, however, patient noted to be intoxicated. Signed out to Dr. Tiana Ponce pending reevaluation by crisis once sober. Alcohol intoxication ([address]): acute illness or injury  Suicidal ideations: acute illness or injury  Amount and/or Complexity of Data Reviewed  Labs: ordered. Risk  Prescription drug management. Decision regarding hospitalization. Disposition  Final diagnoses:   Suicidal ideations   Alcohol intoxication ([address])     Time reflects when diagnosis was documented in both MDM as applicable and the Disposition within this note     Time User Action Codes Description Comment    9/22/2023  2:35 PM [employer] Add [R51.535] Suicidal ideations     9/22/2023  2:35 PM [employer] Add [F10.929] Alcohol intoxication [facility])       ED Disposition     ED Disposition   Transfer to [address] [place] [address]   --    Date/Time   Fri Sep 22, 2023  2:35 PM    Comment              Follow-up Information    None         Patient's Medications   Discharge Prescriptions    No medications on file       No discharge procedures on file.     PDMP Review       Value Time User    PDMP Reviewed  Yes 3/22/2023  8:52 AM Zhang Houston MD          ED Provider  Electronically Signed by           Lizbeth Bravo MD  09/22/23 9796

## 2023-09-22 NOTE — ED NOTES
Patient was accompanied by [facility] ([place], [place]) workers [id number] [address] and [employer]. Patient called the crisis line reporting suicidal ideations, with a plan to jump off of a bridge into a river. Patient reported a history of PTSD. They believe the patient to be intoxicated. [place] crisis advised that a 302 was not completed secondary to the patient's willingness to be evaluated at the hospital. Patient is pending medical clearance/legal sobriety.

## 2023-09-23 ENCOUNTER — HOSPITAL ENCOUNTER (INPATIENT)
Facility: HOSPITAL | Age: 48
LOS: 5 days | Discharge: HOME | DRG: 885 | End: 2023-09-28
Attending: STUDENT IN AN ORGANIZED HEALTH CARE EDUCATION/TRAINING PROGRAM | Admitting: STUDENT IN AN ORGANIZED HEALTH CARE EDUCATION/TRAINING PROGRAM
Payer: COMMERCIAL

## 2023-09-23 VITALS
HEART RATE: 75 BPM | RESPIRATION RATE: 20 BRPM | DIASTOLIC BLOOD PRESSURE: 59 MMHG | SYSTOLIC BLOOD PRESSURE: 140 MMHG | OXYGEN SATURATION: 93 % | TEMPERATURE: 98 F

## 2023-09-23 DIAGNOSIS — Z00.8 MEDICAL CLEARANCE FOR PSYCHIATRIC ADMISSION: ICD-10-CM

## 2023-09-23 DIAGNOSIS — F41.1 GAD (GENERALIZED ANXIETY DISORDER): Primary | ICD-10-CM

## 2023-09-23 DIAGNOSIS — F33.2 SEVERE EPISODE OF RECURRENT MAJOR DEPRESSIVE DISORDER, WITHOUT PSYCHOTIC FEATURES (HCC): ICD-10-CM

## 2023-09-23 PROCEDURE — GZ59ZZZ INDIVIDUAL PSYCHOTHERAPY, PSYCHOPHYSIOLOGICAL: ICD-10-PCS | Performed by: STUDENT IN AN ORGANIZED HEALTH CARE EDUCATION/TRAINING PROGRAM

## 2023-09-23 PROCEDURE — GZHZZZZ GROUP PSYCHOTHERAPY: ICD-10-PCS | Performed by: STUDENT IN AN ORGANIZED HEALTH CARE EDUCATION/TRAINING PROGRAM

## 2023-09-23 RX ORDER — IBUPROFEN 400 MG/1
400 TABLET ORAL EVERY 6 HOURS PRN
Status: DISCONTINUED | OUTPATIENT
Start: 2023-09-23 | End: 2023-09-28 | Stop reason: HOSPADM

## 2023-09-23 RX ORDER — OLANZAPINE 5 MG/1
5 TABLET ORAL
Status: DISCONTINUED | OUTPATIENT
Start: 2023-09-23 | End: 2023-09-28 | Stop reason: HOSPADM

## 2023-09-23 RX ORDER — OLANZAPINE 2.5 MG/1
2.5 TABLET ORAL
Status: DISCONTINUED | OUTPATIENT
Start: 2023-09-23 | End: 2023-09-28 | Stop reason: HOSPADM

## 2023-09-23 RX ORDER — OLANZAPINE 10 MG/1
5 INJECTION, POWDER, LYOPHILIZED, FOR SOLUTION INTRAMUSCULAR
Status: DISCONTINUED | OUTPATIENT
Start: 2023-09-23 | End: 2023-09-28 | Stop reason: HOSPADM

## 2023-09-23 RX ORDER — IBUPROFEN 600 MG/1
600 TABLET ORAL EVERY 8 HOURS PRN
Status: CANCELLED | OUTPATIENT
Start: 2023-09-23

## 2023-09-23 RX ORDER — MAGNESIUM HYDROXIDE/ALUMINUM HYDROXICE/SIMETHICONE 120; 1200; 1200 MG/30ML; MG/30ML; MG/30ML
30 SUSPENSION ORAL EVERY 4 HOURS PRN
Status: CANCELLED | OUTPATIENT
Start: 2023-09-23

## 2023-09-23 RX ORDER — HYDROXYZINE HYDROCHLORIDE 25 MG/1
25 TABLET, FILM COATED ORAL
Status: CANCELLED | OUTPATIENT
Start: 2023-09-23

## 2023-09-23 RX ORDER — ACETAMINOPHEN 325 MG/1
650 TABLET ORAL EVERY 6 HOURS PRN
Status: DISCONTINUED | OUTPATIENT
Start: 2023-09-23 | End: 2023-09-28 | Stop reason: HOSPADM

## 2023-09-23 RX ORDER — DIPHENHYDRAMINE HYDROCHLORIDE 50 MG/ML
50 INJECTION INTRAMUSCULAR; INTRAVENOUS EVERY 6 HOURS PRN
Status: CANCELLED | OUTPATIENT
Start: 2023-09-23

## 2023-09-23 RX ORDER — LANOLIN ALCOHOL/MO/W.PET/CERES
3 CREAM (GRAM) TOPICAL ONCE
Status: COMPLETED | OUTPATIENT
Start: 2023-09-23 | End: 2023-09-23

## 2023-09-23 RX ORDER — LORAZEPAM 2 MG/ML
2 INJECTION INTRAMUSCULAR EVERY 6 HOURS PRN
Status: CANCELLED | OUTPATIENT
Start: 2023-09-23

## 2023-09-23 RX ORDER — DIPHENHYDRAMINE HYDROCHLORIDE 50 MG/ML
50 INJECTION INTRAMUSCULAR; INTRAVENOUS EVERY 6 HOURS PRN
Status: DISCONTINUED | OUTPATIENT
Start: 2023-09-23 | End: 2023-09-28 | Stop reason: HOSPADM

## 2023-09-23 RX ORDER — BENZTROPINE MESYLATE 1 MG/1
1 TABLET ORAL
Status: DISCONTINUED | OUTPATIENT
Start: 2023-09-23 | End: 2023-09-28 | Stop reason: HOSPADM

## 2023-09-23 RX ORDER — IBUPROFEN 600 MG/1
600 TABLET ORAL EVERY 8 HOURS PRN
Status: DISCONTINUED | OUTPATIENT
Start: 2023-09-23 | End: 2023-09-28 | Stop reason: HOSPADM

## 2023-09-23 RX ORDER — OLANZAPINE 10 MG/1
2.5 INJECTION, POWDER, LYOPHILIZED, FOR SOLUTION INTRAMUSCULAR
Status: CANCELLED | OUTPATIENT
Start: 2023-09-23

## 2023-09-23 RX ORDER — BENZTROPINE MESYLATE 1 MG/1
1 TABLET ORAL
Status: CANCELLED | OUTPATIENT
Start: 2023-09-23

## 2023-09-23 RX ORDER — LORAZEPAM 2 MG/ML
2 INJECTION INTRAMUSCULAR EVERY 6 HOURS PRN
Status: DISCONTINUED | OUTPATIENT
Start: 2023-09-23 | End: 2023-09-28 | Stop reason: HOSPADM

## 2023-09-23 RX ORDER — OLANZAPINE 2.5 MG/1
2.5 TABLET ORAL
Status: CANCELLED | OUTPATIENT
Start: 2023-09-23

## 2023-09-23 RX ORDER — AMOXICILLIN 250 MG
1 CAPSULE ORAL DAILY PRN
Status: CANCELLED | OUTPATIENT
Start: 2023-09-23

## 2023-09-23 RX ORDER — MAGNESIUM HYDROXIDE/ALUMINUM HYDROXICE/SIMETHICONE 120; 1200; 1200 MG/30ML; MG/30ML; MG/30ML
30 SUSPENSION ORAL EVERY 4 HOURS PRN
Status: DISCONTINUED | OUTPATIENT
Start: 2023-09-23 | End: 2023-09-28 | Stop reason: HOSPADM

## 2023-09-23 RX ORDER — NICOTINE 21 MG/24HR
21 PATCH, TRANSDERMAL 24 HOURS TRANSDERMAL DAILY
Status: DISCONTINUED | OUTPATIENT
Start: 2023-09-23 | End: 2023-09-23

## 2023-09-23 RX ORDER — HYDROXYZINE 50 MG/1
50 TABLET, FILM COATED ORAL 3 TIMES DAILY PRN
COMMUNITY
End: 2023-09-28

## 2023-09-23 RX ORDER — ACETAMINOPHEN 325 MG/1
650 TABLET ORAL EVERY 6 HOURS PRN
Status: CANCELLED | OUTPATIENT
Start: 2023-09-23

## 2023-09-23 RX ORDER — HYDROXYZINE 50 MG/1
50 TABLET, FILM COATED ORAL
Status: DISCONTINUED | OUTPATIENT
Start: 2023-09-23 | End: 2023-09-28 | Stop reason: HOSPADM

## 2023-09-23 RX ORDER — IBUPROFEN 400 MG/1
400 TABLET ORAL EVERY 6 HOURS PRN
Status: CANCELLED | OUTPATIENT
Start: 2023-09-23

## 2023-09-23 RX ORDER — AMOXICILLIN 250 MG
1 CAPSULE ORAL DAILY PRN
Status: DISCONTINUED | OUTPATIENT
Start: 2023-09-23 | End: 2023-09-28 | Stop reason: HOSPADM

## 2023-09-23 RX ORDER — OLANZAPINE 2.5 MG/1
5 TABLET ORAL
Status: CANCELLED | OUTPATIENT
Start: 2023-09-23

## 2023-09-23 RX ORDER — OLANZAPINE 10 MG/1
5 INJECTION, POWDER, LYOPHILIZED, FOR SOLUTION INTRAMUSCULAR
Status: CANCELLED | OUTPATIENT
Start: 2023-09-23

## 2023-09-23 RX ORDER — HYDROXYZINE HYDROCHLORIDE 25 MG/1
100 TABLET, FILM COATED ORAL
Status: CANCELLED | OUTPATIENT
Start: 2023-09-23

## 2023-09-23 RX ORDER — OLANZAPINE 10 MG/1
2.5 INJECTION, POWDER, LYOPHILIZED, FOR SOLUTION INTRAMUSCULAR
Status: DISCONTINUED | OUTPATIENT
Start: 2023-09-23 | End: 2023-09-28 | Stop reason: HOSPADM

## 2023-09-23 RX ORDER — TRAZODONE HYDROCHLORIDE 50 MG/1
50 TABLET ORAL
Status: CANCELLED | OUTPATIENT
Start: 2023-09-23

## 2023-09-23 RX ORDER — BUSPIRONE HYDROCHLORIDE 10 MG/1
10 TABLET ORAL 3 TIMES DAILY
COMMUNITY
End: 2023-09-28

## 2023-09-23 RX ORDER — HYDROXYZINE HYDROCHLORIDE 25 MG/1
50 TABLET, FILM COATED ORAL
Status: CANCELLED | OUTPATIENT
Start: 2023-09-23

## 2023-09-23 RX ORDER — TRAZODONE HYDROCHLORIDE 50 MG/1
50 TABLET ORAL
Status: DISCONTINUED | OUTPATIENT
Start: 2023-09-23 | End: 2023-09-27

## 2023-09-23 RX ORDER — HYDROXYZINE HYDROCHLORIDE 25 MG/1
25 TABLET, FILM COATED ORAL
Status: DISCONTINUED | OUTPATIENT
Start: 2023-09-23 | End: 2023-09-28 | Stop reason: HOSPADM

## 2023-09-23 RX ORDER — TRAZODONE HYDROCHLORIDE 50 MG/1
50 TABLET ORAL
COMMUNITY
End: 2023-09-28

## 2023-09-23 RX ORDER — HYDROXYZINE 50 MG/1
100 TABLET, FILM COATED ORAL
Status: DISCONTINUED | OUTPATIENT
Start: 2023-09-23 | End: 2023-09-28 | Stop reason: HOSPADM

## 2023-09-23 RX ADMIN — NICOTINE POLACRILEX 2 MG: 4 GUM, CHEWING BUCCAL at 19:00

## 2023-09-23 RX ADMIN — Medication 3 MG: at 00:50

## 2023-09-23 RX ADMIN — HYDROXYZINE HYDROCHLORIDE 50 MG: 50 TABLET, FILM COATED ORAL at 17:54

## 2023-09-23 RX ADMIN — TRAZODONE HYDROCHLORIDE 50 MG: 50 TABLET ORAL at 22:42

## 2023-09-23 NOTE — ED NOTES
Patient is accepted at [facility]  Patient is accepted by Dr. Wing Loza per Wm Ivey. Transportation is arranged with [employer]. Transportation is scheduled for **. TBD  Patient may go to the floor at **. Nurse report is to be called to [phone] prior to patient transfer.

## 2023-09-23 NOTE — ED NOTES
Pt asked for phone numbers to be placed in chart for use while at [facility]. [phone] MJuana PALACIOS  [phone] DEDE Padilla  [phone] Moe Ortega RN  09/23/23 128 [facility] Lisa, RN  09/23/23 128 [facility] Lisa, JIM  09/23/23 5122

## 2023-09-23 NOTE — ED CARE HANDOFF
Emergency Department Sign Out Note        Signout and transfer of care from my colleague, Dr. Cierra Aviles. See Separate Emergency Department note. The patient, Pricila Argueta, was evaluated for suicidal ideation. Labs Reviewed   RAPID DRUG SCREEN, URINE - Abnormal       Result Value Ref Range Status    Amph/Meth UR Negative  Negative Final    Barbiturate Ur Negative  Negative Final    Benzodiazepine Urine Negative  Negative Final    Cocaine Urine Negative  Negative Final    Methadone Urine     Final    Opiate Urine Negative  Negative Final    PCP Ur Negative  Negative Final    THC Urine Positive (*) Negative Final    Oxycodone Urine Negative  Negative Final    Narrative:     No methadone test performed; if required call laboratory  Presumptive report. If requested, specimen will be sent to reference lab for confirmation. FOR MEDICAL PURPOSES ONLY. IF CONFIRMATION NEEDED PLEASE CONTACT THE LAB WITHIN 5 DAYS. Drug Screen Cutoff Levels:  AMPHETAMINE/METHAMPHETAMINES  1000 ng/mL  BARBITURATES     200 ng/mL  BENZODIAZEPINES     200 ng/mL  COCAINE      300 ng/mL  METHADONE      300 ng/mL  OPIATES      300 ng/mL  PHENCYCLIDINE     25 ng/mL  THC       50 ng/mL  OXYCODONE      100 ng/mL   POCT ALCOHOL BREATH TEST - Abnormal    EXTBreath Alcohol 0.182   Final   POCT ALCOHOL BREATH TEST - Abnormal    EXTBreath Alcohol 0.106      POCT ALCOHOL BREATH TEST - Abnormal    EXTBreath Alcohol 0.059   Final     Patient is medically cleared, on bed search(201) for psychiatric admission. No acute events during shift. Patient is to be signed out to my colleague at change of shift, on bed search.          Procedures  MDM        Disposition  Final diagnoses:   Suicidal ideations   Alcohol intoxication ([address])     Time reflects when diagnosis was documented in both MDM as applicable and the Disposition within this note     Time User Action Codes Description Comment    9/22/2023  2:35 PM Jannette Lujan Add [T63.820] Suicidal ideations 9/22/2023  2:35 PM Bertha Coleman Add [F10.929] Alcohol intoxication [facility])       ED Disposition     ED Disposition   Transfer to [address] [place] [address]    Condition   --    Date/Time   Fri Sep 22, 2023  2:35 PM    Comment              MD Documentation    Francisco Minium Most Recent Value   Sending MD Dr. Jamir Drake, DO      Follow-up Information    None       Patient's Medications   Discharge Prescriptions    No medications on file     No discharge procedures on file.        ED Provider  Electronically Signed by     Andrew Montilla MD  09/23/23 6616       Andrew Montilla MD  09/23/23 1073

## 2023-09-23 NOTE — EMTALA/ACUTE CARE TRANSFER
[address] [address]  Dept: [phone]      EMTALA TRANSFER CONSENT    NAME Kath Perez                                         1975                              MRN [id number]    I have been informed of my rights regarding examination, treatment, and transfer   by Dr. Carli Sawyer MD    Benefits: Specialized equipment and/or services available at the receiving facility (Include comment)________________________    Risks: Potential for delay in receiving treatment, Potential deterioration of medical condition, Increased discomfort during transfer, Possible worsening of condition or death during transfer      Transfer Request   I acknowledge that my medical condition has been evaluated and explained to me by the emergency department physician or other qualified medical person and/or my attending physician who has recommended and offered to me further medical examination and treatment. I understand the Hospital's obligation with respect to the treatment and stabilization of my emergency medical condition. I nevertheless request to be transferred. I release the Hospital, the doctor, and any other persons caring for me from all responsibility or liability for any injury or ill effects that may result from my transfer and agree to accept all responsibility for the consequences of my choice to transfer, rather than receive stabilizing treatment at the Hospital. I understand that because the transfer is my request, my insurance may not provide reimbursement for the services. The Hospital will assist and direct me and my family in how to make arrangements for transfer, but the hospital is not liable for any fees charged by the transport service. In spite of this understanding, I refuse to consent to further medical examination and treatment which has been offered to me, and request transfer to [address] [place] [address] [address] Name, [address] [place] [address] : [place].  I authorize the performance of emergency medical procedures and treatments upon me in both transit and upon arrival at the receiving facility. Additionally, I authorize the release of any and all medical records to the receiving facility and request they be transported with me, if possible. I authorize the performance of emergency medical procedures and treatments upon me in both transit and upon arrival at the receiving facility. Additionally, I authorize the release of any and all medical records to the receiving facility and request they be transported with me, if possible. I understand that the safest mode of transportation during a medical emergency is an ambulance and that the Hospital advocates the use of this mode of transport. Risks of traveling to the receiving facility by car, including absence of medical control, life sustaining equipment, such as oxygen, and medical personnel has been explained to me and I fully understand them. (CHACORTA CORRECT BOX BELOW)  [  ]  I consent to the stated transfer and to be transported by ambulance/helicopter. [  ]  I consent to the stated transfer, but refuse transportation by ambulance and accept full responsibility for my transportation by car. I understand the risks of non-ambulance transfers and I exonerate the Hospital and its staff from any deterioration in my condition that results from this refusal.    X___________________________________________    DATE  23  TIME________  Signature of patient or legally responsible individual signing on patient behalf           RELATIONSHIP TO PATIENT_________________________          Provider Certification    NAME [facility] 1975                              MRN [id number]    A medical screening exam was performed on the above named patient. Based on the examination:    Condition Necessitating Transfer The primary encounter diagnosis was Suicidal ideations. Diagnoses of Alcohol intoxication ([address]) and Medical clearance for psychiatric admission were also pertinent to this visit. Patient Condition: The patient has been stabilized such that within reasonable medical probability, no material deterioration of the patient condition or the condition of the unborn child(ayse) is likely to result from the transfer    Reason for Transfer: Level of Care needed not available at this facility    Transfer Requirements: [address] [facility] [address]   · Space available and qualified personnel available for treatment as acknowledged by    · Agreed to accept transfer and to provide appropriate medical treatment as acknowledged by       Dr. Deniz Conner  · Appropriate medical records of the examination and treatment of the patient are provided at the time of transfer   [address] _______  · Transfer will be performed by qualified personnel from    and appropriate transfer equipment as required, including the use of necessary and appropriate life support measures.     Provider Certification: I have examined the patient and explained the following risks and benefits of being transferred/refusing transfer to the patient/family:  General risk, such as traffic hazards, adverse weather conditions, rough terrain or turbulence, possible failure of equipment (including vehicle or aircraft), or consequences of actions of persons outside the control of the transport personnel, Unanticipated needs of medical equipment and personnel during transport, Risk of worsening condition, The possibility of a transport vehicle being unavailable      Based on these reasonable risks and benefits to the patient and/or the unborn child(ayse), and based upon the information available at the time of the patient’s examination, I certify that the medical benefits reasonably to be expected from the provision of appropriate medical treatments at another medical facility outweigh the increasing risks, if any, to the individual’s medical condition, and in the case of labor to the unborn child, from effecting the transfer.     X____________________________________________ DATE 09/23/23        TIME_______      ORIGINAL - SEND TO MEDICAL RECORDS   COPY - SEND WITH PATIENT DURING TRANSFER

## 2023-09-23 NOTE — ED CARE HANDOFF
Emergency Department Sign Out Note        Sign out and transfer of care from dr Carlos Coombs Emergency Department note. The patient, Shraddha Wong, was evaluated by the previous provider for SI. Workup Completed:  Labs Reviewed   RAPID DRUG SCREEN, URINE - Abnormal       Result Value Ref Range Status    Amph/Meth UR Negative  Negative Final    Barbiturate Ur Negative  Negative Final    Benzodiazepine Urine Negative  Negative Final    Cocaine Urine Negative  Negative Final    Methadone Urine     Final    Opiate Urine Negative  Negative Final    PCP Ur Negative  Negative Final    THC Urine Positive (*) Negative Final    Oxycodone Urine Negative  Negative Final    Narrative:     No methadone test performed; if required call laboratory  Presumptive report. If requested, specimen will be sent to reference lab for confirmation. FOR MEDICAL PURPOSES ONLY. IF CONFIRMATION NEEDED PLEASE CONTACT THE LAB WITHIN 5 DAYS. Drug Screen Cutoff Levels:  AMPHETAMINE/METHAMPHETAMINES  1000 ng/mL  BARBITURATES     200 ng/mL  BENZODIAZEPINES     200 ng/mL  COCAINE      300 ng/mL  METHADONE      300 ng/mL  OPIATES      300 ng/mL  PHENCYCLIDINE     25 ng/mL  THC       50 ng/mL  OXYCODONE      100 ng/mL   POCT ALCOHOL BREATH TEST - Abnormal    EXTBreath Alcohol 0.182   Final   POCT ALCOHOL BREATH TEST - Abnormal    [facility] Alcohol 0.106      POCT ALCOHOL BREATH TEST - Abnormal    EXT[facility]ath Alcohol 0.059   Final         ED Course / Workup Pending (followup): This is 56y old came for having SI, and pt had multiple plans to do that. Pt stated she is overwhelmed by events in life , and other family members commit suicide . Pt is cleared medically and 201 is signed. Pt is waiting for bed.     pt accepted at [facility], accepted by dr Madeleine Barfield                                   Procedures  MDM        Disposition  Final diagnoses:   Suicidal ideations   Alcohol intoxication ([address])     Time reflects when diagnosis was documented in both MDM as applicable and the Disposition within this note     Time User Action Codes Description Comment    9/22/2023  2:35 PM Los Mcdonald Add [A43.540] Suicidal ideations     9/22/2023  2:35 PM Los Mcdonald Add [F10.929] Alcohol intoxication [facility])       ED Disposition     ED Disposition   Transfer to [address] [place] [address]   --    Date/Time   Sat Sep 23, 2023  6:55 AM    Comment   Kath Perez should be transferred out to behavioral health, and has been medically cleared. MD Meme Long   Sending MD Dr. Sara Camarillo, DO      Follow-up Information    None       Patient's Medications   Discharge Prescriptions    No medications on file     No discharge procedures on file.        ED Provider  Electronically Signed by     Jensen Sr MD  09/24/23 2128

## 2023-09-23 NOTE — NURSING NOTE
Pt is a 201 arriving with SI no plan. Pt states having several ill family members who pt is concerned with which is causing elevated anxiety and depression. 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 States "I always take care of other people and put my own care on the back burner." pt denies HI, AVH. Reports having frequent flashbacks/nightmares causing poor sleep. Reports drinking whiskey the other day however typically does not drink alcohol. Denies drug use however UDS positive THC. Smokes 1 PPD cigarettes. Medical hx left hip fracture with surgery in 2022. Pt remains calm and cooperative. Reviewed unit routine/schedule.

## 2023-09-23 NOTE — QUICK NOTE
Patient arrived with the following:     [employer] cards  Cigarettes  2 single earrings  Keys and keychains  Pack of gum   usb drive   Eye liner   Lipstick x 2   cellphone  [address] registration card  [employer] piece of paper  Social security card  Ortho card  Visa card  [employer] card   Drivers license card  [employer] one card x2  [employer]est[employer] card   [employer] dash card  Drivers license x2    [employer] bank card  [employer] card  [employer] bank card  Shorts  Underwear  Bra   tshirt   Sweater   Flip flops

## 2023-09-23 NOTE — ED NOTES
Pt is a 56 y.o. adult who was brought to the ED with   Chief Complaint   Patient presents with   • Psychiatric Evaluation     Pt presents to ED from home feeling SI, psych hx.  Pt's plan to jump off a bridge into a river. Intake Assessment completed, Safety risk Assessment completed  Pt will be admitted to [facility]    Patient is a 56year old male to female transgender person presenting to the emergency department due to worsening depression and severe anxiety due to triggered PTSD and SI with a reoccurring plan to jump from a bridge. Patient also has a PMH of insomnia and obstructive sleep apnea/ chronic. Patient presents as lethargic,emotionally overwhelmed and fearful of increased suicidal ideations. Patient states she has not felt this bad in approximately 8 plus years and knows from previous therapy when she is in need of outside help. Patient states she has no concerns about withdrawal from alcohol and is not a regular drinker but is concerned because of her recent and rapid decompensation. Patient currently lives with a roommate, her best friend, but states they both work a lot. Patient is currently working full time at [employer] as a . Pt is comfortable taking time off of work to care for her mental health needs and get back to feeling like herself. Patient believes she is in need of updated medication management and a referral for therapeutic counseling. [facility] explained the voluntary commitment process and potential timeline. Pt signed the 201 and returned to resting peacefully.

## 2023-09-23 NOTE — ED NOTES
Insurance Authorization for admission:   Phone call placed to [place] number:  to  Jayla  3 days approved. Level of care: 201  Review on 9/26  Authorization # [id number].

## 2023-09-24 PROBLEM — F41.1 GAD (GENERALIZED ANXIETY DISORDER): Status: ACTIVE | Noted: 2023-09-24

## 2023-09-24 PROBLEM — R45.851 SUICIDAL IDEATIONS: Status: ACTIVE | Noted: 2023-09-24

## 2023-09-24 PROBLEM — Z00.8 MEDICAL CLEARANCE FOR PSYCHIATRIC ADMISSION: Status: ACTIVE | Noted: 2023-09-24

## 2023-09-24 PROBLEM — F33.2 MDD (MAJOR DEPRESSIVE DISORDER), RECURRENT EPISODE, SEVERE (HCC): Status: ACTIVE | Noted: 2023-09-24

## 2023-09-24 LAB
ALBUMIN SERPL BCP-MCNC: 3.7 G/DL (ref 3.5–5)
ALP SERPL-CCNC: 44 U/L (ref 34–104)
ALT SERPL W P-5'-P-CCNC: 17 U/L (ref 7–52)
ANION GAP SERPL CALCULATED.3IONS-SCNC: 5 MMOL/L
AST SERPL W P-5'-P-CCNC: 15 U/L (ref 5–45)
BASOPHILS # BLD AUTO: 0.11 THOUSANDS/ÂΜL (ref 0–0.1)
BASOPHILS NFR BLD AUTO: 1 % (ref 0–1)
BILIRUB SERPL-MCNC: 0.31 MG/DL (ref 0.2–1)
BUN SERPL-MCNC: 12 MG/DL (ref 5–25)
CALCIUM SERPL-MCNC: 8.9 MG/DL (ref 8.4–10.2)
CHLORIDE SERPL-SCNC: 106 MMOL/L (ref 96–108)
CHOLEST SERPL-MCNC: 165 MG/DL
CO2 SERPL-SCNC: 27 MMOL/L (ref 21–32)
CREAT SERPL-MCNC: 0.77 MG/DL (ref 0.6–1.3)
EOSINOPHIL # BLD AUTO: 0.38 THOUSAND/ÂΜL (ref 0–0.61)
EOSINOPHIL NFR BLD AUTO: 4 % (ref 0–6)
ERYTHROCYTE [DISTWIDTH] IN BLOOD BY AUTOMATED COUNT: 12.7 % (ref 11.6–15.1)
GLUCOSE P FAST SERPL-MCNC: 95 MG/DL (ref 65–99)
GLUCOSE SERPL-MCNC: 95 MG/DL (ref 65–140)
HCG SERPL QL: NEGATIVE
HCT VFR BLD AUTO: 38.3 % (ref 36.5–46.1)
HDLC SERPL-MCNC: 35 MG/DL
HGB BLD-MCNC: 12.6 G/DL (ref 12–15.4)
IMM GRANULOCYTES # BLD AUTO: 0.05 THOUSAND/UL (ref 0–0.2)
IMM GRANULOCYTES NFR BLD AUTO: 1 % (ref 0–2)
LDLC SERPL CALC-MCNC: 74 MG/DL (ref 0–100)
LYMPHOCYTES # BLD AUTO: 3.34 THOUSANDS/ÂΜL (ref 0.6–4.47)
LYMPHOCYTES NFR BLD AUTO: 34 % (ref 14–44)
MCH RBC QN AUTO: 30 PG (ref 26.8–34.3)
MCHC RBC AUTO-ENTMCNC: 32.9 G/DL (ref 31.4–37.4)
MCV RBC AUTO: 91 FL (ref 82–98)
MONOCYTES # BLD AUTO: 0.58 THOUSAND/ÂΜL (ref 0.17–1.22)
MONOCYTES NFR BLD AUTO: 6 % (ref 4–12)
NEUTROPHILS # BLD AUTO: 5.48 THOUSANDS/ÂΜL (ref 1.85–7.62)
NEUTS SEG NFR BLD AUTO: 54 % (ref 43–75)
NONHDLC SERPL-MCNC: 130 MG/DL
NRBC BLD AUTO-RTO: 0 /100 WBCS
PLATELET # BLD AUTO: 322 THOUSANDS/UL (ref 149–390)
PMV BLD AUTO: 9.7 FL (ref 8.9–12.7)
POTASSIUM SERPL-SCNC: 4.1 MMOL/L (ref 3.5–5.3)
PROT SERPL-MCNC: 6 G/DL (ref 6.4–8.4)
RBC # BLD AUTO: 4.2 MILLION/UL (ref 3.88–5.12)
SODIUM SERPL-SCNC: 138 MMOL/L (ref 135–147)
TRIGL SERPL-MCNC: 280 MG/DL
WBC # BLD AUTO: 9.94 THOUSAND/UL (ref 4.31–10.16)

## 2023-09-24 PROCEDURE — 99253 IP/OBS CNSLTJ NEW/EST LOW 45: CPT | Performed by: PHYSICIAN ASSISTANT

## 2023-09-24 PROCEDURE — 80053 COMPREHEN METABOLIC PANEL: CPT | Performed by: STUDENT IN AN ORGANIZED HEALTH CARE EDUCATION/TRAINING PROGRAM

## 2023-09-24 PROCEDURE — 84703 CHORIONIC GONADOTROPIN ASSAY: CPT | Performed by: STUDENT IN AN ORGANIZED HEALTH CARE EDUCATION/TRAINING PROGRAM

## 2023-09-24 PROCEDURE — 85025 COMPLETE CBC W/AUTO DIFF WBC: CPT | Performed by: STUDENT IN AN ORGANIZED HEALTH CARE EDUCATION/TRAINING PROGRAM

## 2023-09-24 PROCEDURE — 80061 LIPID PANEL: CPT | Performed by: STUDENT IN AN ORGANIZED HEALTH CARE EDUCATION/TRAINING PROGRAM

## 2023-09-24 PROCEDURE — 99223 1ST HOSP IP/OBS HIGH 75: CPT | Performed by: PSYCHIATRY & NEUROLOGY

## 2023-09-24 RX ORDER — NICOTINE 21 MG/24HR
1 PATCH, TRANSDERMAL 24 HOURS TRANSDERMAL DAILY
Status: DISCONTINUED | OUTPATIENT
Start: 2023-09-24 | End: 2023-09-28 | Stop reason: HOSPADM

## 2023-09-24 RX ORDER — BUSPIRONE HYDROCHLORIDE 5 MG/1
5 TABLET ORAL 3 TIMES DAILY
Status: DISCONTINUED | OUTPATIENT
Start: 2023-09-24 | End: 2023-09-28 | Stop reason: HOSPADM

## 2023-09-24 RX ORDER — FINASTERIDE 5 MG/1
5 TABLET, FILM COATED ORAL DAILY
Status: DISCONTINUED | OUTPATIENT
Start: 2023-09-24 | End: 2023-09-28 | Stop reason: HOSPADM

## 2023-09-24 RX ORDER — QUETIAPINE FUMARATE 25 MG/1
25 TABLET, FILM COATED ORAL
Status: DISCONTINUED | OUTPATIENT
Start: 2023-09-24 | End: 2023-09-26

## 2023-09-24 RX ORDER — FLUOXETINE HYDROCHLORIDE 20 MG/1
20 CAPSULE ORAL DAILY
Status: DISCONTINUED | OUTPATIENT
Start: 2023-09-24 | End: 2023-09-28 | Stop reason: HOSPADM

## 2023-09-24 RX ORDER — BICALUTAMIDE 50 MG/1
50 TABLET, FILM COATED ORAL DAILY
Status: DISCONTINUED | OUTPATIENT
Start: 2023-09-24 | End: 2023-09-28 | Stop reason: HOSPADM

## 2023-09-24 RX ADMIN — NICOTINE 1 PATCH: 21 PATCH, EXTENDED RELEASE TRANSDERMAL at 11:21

## 2023-09-24 RX ADMIN — FINASTERIDE 5 MG: 5 TABLET, FILM COATED ORAL at 15:36

## 2023-09-24 RX ADMIN — NICOTINE POLACRILEX 2 MG: 4 GUM, CHEWING BUCCAL at 07:13

## 2023-09-24 RX ADMIN — FLUOXETINE HYDROCHLORIDE 20 MG: 20 CAPSULE ORAL at 11:21

## 2023-09-24 RX ADMIN — NICOTINE POLACRILEX 2 MG: 4 GUM, CHEWING BUCCAL at 09:18

## 2023-09-24 RX ADMIN — BUSPIRONE HYDROCHLORIDE 5 MG: 5 TABLET ORAL at 11:21

## 2023-09-24 RX ADMIN — QUETIAPINE FUMARATE 25 MG: 25 TABLET ORAL at 21:48

## 2023-09-24 RX ADMIN — BUSPIRONE HYDROCHLORIDE 5 MG: 5 TABLET ORAL at 15:37

## 2023-09-24 RX ADMIN — BUSPIRONE HYDROCHLORIDE 5 MG: 5 TABLET ORAL at 21:48

## 2023-09-24 RX ADMIN — BICALUTAMIDE 50 MG: 50 TABLET, FILM COATED ORAL at 15:36

## 2023-09-24 NOTE — PLAN OF CARE
Problem: DISCHARGE PLANNING  Goal: Discharge to home or other facility with appropriate resources  Description: INTERVENTIONS:  - Identify barriers to discharge w/patient and caregiver  - Arrange for needed discharge resources and transportation as appropriate  - Identify discharge learning needs (meds, wound care, etc.)  - Arrange for interpretive services to assist at discharge as needed  - Refer to Case Management Department for coordinating discharge planning if the patient needs post-hospital services based on physician/advanced practitioner order or complex needs related to functional status, cognitive ability, or social support system  Outcome: Progressing  Note: Pt met with CM to complete intake; Pt declined any ROIs at this time. Pt read & signed her Treatment Plan with CM.

## 2023-09-24 NOTE — TREATMENT TEAM
09/24/23 1000   Team Meeting   Meeting Type Daily Rounds   Team Members Present   Team Members Present Physician;Nurse   Physician Team Member [address] Team Member Terra   Patient/Family Present   Patient Present No   Patient's Family Present No       AM rounds- SI no plan, stressors of several family members being ill. Works full time and volunteers, increased stress. Hx of trauma- nightmares and flashbacks. Calm and cooperative, slept well.     Readmit score- 9

## 2023-09-24 NOTE — ASSESSMENT & PLAN NOTE
• At this time, patient appears medically stable to continue inpatient psychiatric management. • Current labs, nursing notes and imaging reviewed.   o Pending Labs: folate, B12, vit D, TSH  • Obtain EKG for baseline QTc  • Please contact [facility] for any further questions

## 2023-09-24 NOTE — NURSING NOTE
Pt observed watching TV in the day room after breakfast. Pt sleeping too much. Reports when pt is struggling with anxiety and depression, pt shuts down and sleeps for days. Reports thoughts of grief and fear are overwhelming. Hopeful to open up with psychiatrist. Reports intermittent passive SI with no plan or intent. Able to express needs and talk with staff. Denies HI or hallucinations. Pt educated pharmacy carries two hormones medication (Casodex and Proscar) and will send them over this afternoon. Asked if anyone from home could bring in the progesterone. Pt states "No, but I will be fine for a couple days without it".

## 2023-09-24 NOTE — ASSESSMENT & PLAN NOTE
· Ordered patient's home progesterone, biclutamide and finasteride daily   · Also receives weekly estradiol IM injections  · Hormone therapy is prescribed by [facility]

## 2023-09-24 NOTE — PROGRESS NOTES
Treatment Plan Meeting:  Diagnosis of MDD (major depressive disorder), recurrent episode, severe,  DASH (generalized anxiety disorder), PTSD (post-traumatic stress disorder), and Tobacco dependence reviewed. Discussed short term goals for decrease in depressive symptoms, decrease in anxiety symptoms, decrease in suicidal thoughts, ability to stay safe on the unit, and improvement in insight. At this time, no discharge date is set. All parties in agreement & treatment plan was signed.      09/24/23 1215   Team Meeting   Meeting Type Tx Team Meeting   Initial Conference Date 09/24/23   Next Conference Date 10/23/23   Team Members Present   Team Members Present Physician;Nurse;   Physician Team Member Dr. Eugenia Mcfadden Team Member [facility] Management Team Member Kimberly   Patient/Family Present   Patient Present No  (Pt declined to meet with Treatment Team)   Patient's Family Present No

## 2023-09-24 NOTE — NURSING NOTE
Pt remains pleasant and social with peers. Reports passive/fleeting SI with no plan. Denies intent. States feeling more hopeful mood will improve as medications are started. Reviewed scheduled medications, pt denies any questions.
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
## 2023-09-24 NOTE — CONSULTS
[address]  Consult  Name: Francy Blunt 56 y.o. adult I MRN: [id number]  Unit/Bed#: -01 I Date of Admission: 9/23/2023   Date of Service: 9/24/2023 I Hospital Day: 1    Inpatient consult for Medical Clearance for [place] patient  Consult performed by: Nanci Wallace PA-C  Consult ordered by: Derek Echevarria MD          Assessment/Plan   Medical clearance for psychiatric admission  Assessment & Plan  • At this time, patient appears medically stable to continue inpatient psychiatric management. • Current labs, nursing notes and imaging reviewed. o Pending Labs: folate, B12, vit D, TSH  • Obtain EKG for baseline QTc  • Please contact [facility] for any further questions      Suicidal ideations  Assessment & Plan  · Management per psych    Tobacco dependence  Assessment & Plan  · 1PPD smoker  · Nicotine patch provided    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  Assessment & Plan  · Ordered patient's home progesterone, biclutamide and finasteride daily   · Also receives weekly estradiol IM injections  · Hormone therapy is prescribed by [facility]        Recommendations for Discharge:  · Vivianne Fothergill will continue to be available for any questions or concerns. · Follow up with PCP upon discharge. Counseling / Coordination of Care Time: 30 minutes. Greater than 50% of total time spent on patient counseling and coordination of care. Collaboration of Care: Were Recommendations Directly Discussed with Primary Treatment Team? - Yes     History of Present Illness: Francy Blunt is a 56 y.o. adult with PMHx of PTSD who is originally admitted to the psychiatric service due to suicidal ideation. We are consulted for medical clearance for psychiatric hospitalization and medical management. Patient's only chronic complaint is some hip pain and leg pain that occurs after prolonged sitting at work. Presently has no joint or muscle pain.   Patient otherwise denies any medical comorbidities. Receives hormone therapy from [facility] who also serves as patient's PCP. No longer taking Descovy. Denies regular alcohol use and denies illicit drug use. All other review of systems negative. Review of Systems:    Review of Systems   Constitutional: Negative. HENT: Negative. Eyes: Negative. Respiratory: Negative. Cardiovascular: Negative. Gastrointestinal: Negative. Endocrine: Negative. Genitourinary: Negative. Musculoskeletal: Negative. Allergic/Immunologic: Negative. Neurological: Negative. Hematological: Negative. Psychiatric/Behavioral: Negative. Past Medical and Surgical History:     Past Medical History:   Diagnosis Date   • Anxiety    • Depression    • HPV (human papilloma virus) infection    • 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    • Sleep apnea        Past Surgical History:   Procedure Laterality Date   • ORIF HIP FRACTURE Left 7/7/2022    Procedure: OPEN REDUCTION W/ INTERNAL FIXATION (ORIF) LEFT FEMORAL NECK FRACTURE;  Surgeon: Jaci Guzman MD;  Location: [facility];  Service: Orthopedics   • AL ANRCT XM SURG REQ ANES GENERAL SPI/EDRL DX N/A 3/22/2023    Procedure: EXAM UNDER ANESTHESIA (EUA); Surgeon: Ritchie Iglesias MD;  Location: [facility];  Service: Colorectal   • AL DSTRJ LESION ANUS EXTENSIVE N/A 3/22/2023    Procedure: Patricio Reason CONDYLOMA ANAL/RECTAL;  Surgeon: Ritchie Iglesias MD;  Location: [facility];  Service: Colorectal       Meds/Allergies:    all medications and allergies reviewed    Allergies:    Allergies   Allergen Reactions   • Penicillins Rash and Fever   • Sulfa Antibiotics Rash and Fever       Social History:     Marital Status: Single    Substance Use History:   Social History     Substance and Sexual Activity   Alcohol Use Yes   • Alcohol/week: 4.0 standard drinks of alcohol   • Types: 2 Cans of beer, 2 Shots of liquor per week    Comment: social     Social History Tobacco Use   Smoking Status Every Day   • Packs/day: 1.00   • Types: Cigarettes   • Last attempt to quit: 3/14/2023   • Years since quittin.5   Smokeless Tobacco Never     Social History     Substance and Sexual Activity   Drug Use Yes   • Types: Marijuana    Comment: occassional/ 4 days ago       Family History:    non-contributory    Physical Exam:     Vitals:   Blood Pressure: 129/86 (23)  Pulse: 64 (23)  Temperature: 98 °F (36.7 °C) (23)  Temp Source: Tympanic (23)  Respirations: 16 (23)  Height: 5' 11" (180.3 cm) (23 135)  Weight - Scale: 76.1 kg (167 lb 12.8 oz) (23 135)  SpO2: 98 % (23)    Physical Exam  Vitals and nursing note reviewed. Constitutional:       General: She is not in acute distress. Appearance: Normal appearance. She is normal weight. She is not ill-appearing or toxic-appearing. HENT:      Head: Normocephalic and atraumatic. Right Ear: External ear normal.      Left Ear: External ear normal.      Nose: Nose normal.      Mouth/Throat:      Mouth: Mucous membranes are moist.      Pharynx: Oropharynx is clear. Eyes:      Conjunctiva/sclera: Conjunctivae normal.   Cardiovascular:      Rate and Rhythm: Normal rate and regular rhythm. Pulses: Normal pulses. Heart sounds: Normal heart sounds. No murmur heard. No gallop. Pulmonary:      Effort: Pulmonary effort is normal. No respiratory distress. Breath sounds: Normal breath sounds. No stridor. No wheezing, rhonchi or rales. Abdominal:      General: Abdomen is flat. Bowel sounds are normal. There is no distension. Palpations: Abdomen is soft. There is no mass. Tenderness: There is no abdominal tenderness. There is no guarding. Hernia: No hernia is present. Musculoskeletal:      Cervical back: Normal range of motion. Right lower leg: No edema. Left lower leg: No edema.    Skin:     General: Skin is warm and dry. Neurological:      Mental Status: She is alert. Mental status is at baseline. Comments: No gross cranial nerve deficit   Psychiatric:         Mood and Affect: Mood normal.         Additional Data:     Lab Results:     Results from last 7 days   Lab Units 09/24/23  0625   WBC Thousand/uL 9.94   HEMOGLOBIN g/dL 12.6   HEMATOCRIT % 38.3   PLATELETS Thousands/uL 322   NEUTROS PCT % 54   LYMPHS PCT % 34   MONOS PCT % 6   EOS PCT % 4     Results from last 7 days   Lab Units 09/24/23  0625   SODIUM mmol/L 138   POTASSIUM mmol/L 4.1   CHLORIDE mmol/L 106   CO2 mmol/L 27   BUN mg/dL 12   CREATININE mg/dL 0.77   ANION GAP mmol/L 5   CALCIUM mg/dL 8.9   ALBUMIN g/dL 3.7   TOTAL BILIRUBIN mg/dL 0.31   ALK PHOS U/L 44   ALT U/L 17   AST U/L 15   GLUCOSE RANDOM mg/dL 95             No results found for: "HGBA1C"            Imaging: I have personally reviewed pertinent reports. No orders to display       EKG, Pathology, and Other Studies Reviewed on Admission:   · EKG: see above    ** Please Note: This note has been constructed using a voice recognition system.  **

## 2023-09-24 NOTE — TREATMENT PLAN
TREATMENT PLAN REVIEW - [address] 47 y.o. 1975 adult MRN: [id number]    Tanvir Johnson Room / Bed: [facility] 201/[facility] 201-01 Encounter: [id number]          Admit Date/Time:  9/23/2023  1:50 PM    Treatment Team: Attending Provider: Isamar Solitario MD; Patient Care Assistant: Norm Garcia; Registered Nurse: Estefany Bhardwaj, RN; Registered Nurse: Phuong Du, RN; Charge Nurse: Neelam Barfield RN; Nursing Student: Jose Loya; Registered Nurse: Dafne Rivera, JIM; Patient Care Assistant: Darron Mckee; : Bertha Atkinson;  Advanced Practitioner: Tayler Wild PA-C; Patient Care Assistant: Michael Hernandez    Diagnosis: Principal Problem:    MDD (major depressive disorder), recurrent episode, severe ([address])  Active Problems:    DASH (generalized anxiety disorder)    PTSD (post-traumatic stress disorder)    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    Tobacco dependence    Medical clearance for psychiatric admission      Patient Strengths/Assets: ability for insight, adapts well, average or above intelligence, capable of independent living, cooperative, communication skills, motivated    Patient Barriers/Limitations: financial instability, lack of social/family support, poor past treatment response    Short Term Goals: decrease in depressive symptoms, decrease in anxiety symptoms, decrease in suicidal thoughts, ability to stay safe on the unit, improvement in insight    Long Term Goals: improvement in depression, improvement in anxiety, free of suicidal thoughts, acceptance of need for psychiatric medications, acceptance of need for psychiatric treatment, acceptance of need for psychiatric follow up after discharge    Progress Towards Goals: starting psychiatric medications as prescribed    Recommended Treatment: medication management, patient medication education, group therapy, milieu therapy, continued Behavioral Health psychiatric evaluation/assessment process    Treatment Frequency: daily medication monitoring, group and milieu therapy daily, monitoring through interdisciplinary rounds, monitoring through weekly patient care conferences    Expected Discharge Date:  Up to 30 midnights    Discharge Plan: referral for outpatient medication management with a psychiatrist, referral for outpatient psychotherapy, referrals as indicated    Treatment Plan Created/Updated By: Kiko Adler DO

## 2023-09-24 NOTE — NURSING NOTE
F/U on Trazodone administration at 2243 hrs for sleep aid. Pt appeared to be asleep, no difficulty observed.

## 2023-09-24 NOTE — CASE MANAGEMENT
Readmit score:  13(Green)   Confirmed Address:    [place]: [facility] [address] [place], [place] ([place]), [address] [place] [facility]   Resides in the home with:   Pt reported she lives with her best friend. Will Return Home at Discharge: Yes   Confirmed Phone Number: (cell) [phone]   Confirmed Email Address: [email or web address]    Marital Status:      Children:     None   Family History:                            Parents:                         Siblings: Pt reported a family history of her mother & maternal aunts having mental health issues. Pt reported her father was an alcoholic. Pt reported that both of her parents are . Pt has one sister, who is 8 yrs older; she lives in [facility]. Commitment Status:  201   Admitted from:   [facility] on 2023 @ [phone]   Presenting C/O:       Pt reported that she was aware that her suicidal ideations were accelerating. Pt reported she does have 2 relatives that  or or are actively dying, and this has been a stressor. Pt reported that due to her complex PTSD, she has a hard time with therapy & needs to build a relationship with 1 therapist.  She could not longer afford her former therapist in [place]. Past Inpatient Tx:   Pt reported prior to her transitioning, she had 5-6 hospitalization. She said her last inpatient admission was probably when she was 38 y/o. Past Suicide Attempts: Pt reported she has had SI since she was 16 y/o but has not acted on them. Current outpatient:    Psychiatrist:   None   Therapist:   None   ACT/ICM/CPS/WRT/SC: None   PCP:   Javed DODSON  [phone]   Med Hx/Concern:   Pt denied any medical concerns at this time. Medications:   Pt reported she has been off medication for a few years. Pharmacy:   [address]   Spirituality/Pentecostalism: Pt reported she is a cherry   Education:   Pt graduated high school.   She reported she completed her viry year of college, she was studying computers. Work/Income:     Pt works full time as a  for [employer].  Monday through Friday 9:30 AM to 6 PM.  Pt reported she let them know she was going to the hospital.     Legal:    Pt denied   Access to Firearms: Pt denied   Transportation:   Pt drives independently. Strength:   Pt identified a strength is that she is kind. Coping Skills:   Pt identified coping skills of music, walking the dog, or arts. Goal:   Pt identified her goal is for stabilization & get back a sense of security. Referrals Needed: Pt agreed with referrals for psychiatrist & psychologist.    Transport at Discharge: STAR   IMM: N/A [place] Text MAYA: N/A   Emergency Contact:  Corinne Goodwin(friend) [phone]   ROIs obtained:   None at this time   Insurance:    [employer]   Audit: 2       PAWSS: 1   BAT: 0.182 UDS: + THC   Substance Abuse: Freq. Amount Last Use Notes:   Heroin       Amp/Meth       Alcohol Mostly weekends A few beers 9/22 Pt reported she use to drink a bottle of whiskey. She had cut back on her [employer] use since Labor day but was drinking prior to coming to the hospital.   Cocaine       Cannabis ocassional       Benzodiazepine       Barbituartes       Other       Tobacco Daily  pk/day  Pt began smoking when she was 18 y/o. She is using the patch on the unit. Pt read & signed her Treatment Plan with POLLY. CM assisted Pt with access her cell phone & she called Lisa Dickson, who said a representative will call her back on Mon/Tues regarding filing FMLA. Pt gave the nurses station phone number for them to contact her. Pt sent a text message to her manager as well.

## 2023-09-24 NOTE — NURSING NOTE
Patient remained in bed for most of the shift. Was out of bed to attend 6:30 PM nursing group, but otherwise was withdrawn in her room and asleep. Able to interact appropriately during group and offer suggestions to what coping skills work for her. Pleasant but appears depressed. Received Atarax 50 mg PO for anxiety. Upon follow up, patient was in bed. Denies SI / HI / A/VH.

## 2023-09-25 LAB
25(OH)D3 SERPL-MCNC: 33.1 NG/ML (ref 30–100)
FOLATE SERPL-MCNC: 8.3 NG/ML
TSH SERPL DL<=0.05 MIU/L-ACNC: 2.1 UIU/ML (ref 0.45–4.5)
VIT B12 SERPL-MCNC: 171 PG/ML (ref 180–914)

## 2023-09-25 PROCEDURE — 99232 SBSQ HOSP IP/OBS MODERATE 35: CPT | Performed by: PSYCHIATRY & NEUROLOGY

## 2023-09-25 PROCEDURE — 82306 VITAMIN D 25 HYDROXY: CPT | Performed by: STUDENT IN AN ORGANIZED HEALTH CARE EDUCATION/TRAINING PROGRAM

## 2023-09-25 PROCEDURE — 82746 ASSAY OF FOLIC ACID SERUM: CPT | Performed by: STUDENT IN AN ORGANIZED HEALTH CARE EDUCATION/TRAINING PROGRAM

## 2023-09-25 PROCEDURE — 84443 ASSAY THYROID STIM HORMONE: CPT | Performed by: STUDENT IN AN ORGANIZED HEALTH CARE EDUCATION/TRAINING PROGRAM

## 2023-09-25 PROCEDURE — 82607 VITAMIN B-12: CPT | Performed by: STUDENT IN AN ORGANIZED HEALTH CARE EDUCATION/TRAINING PROGRAM

## 2023-09-25 RX ADMIN — BICALUTAMIDE 50 MG: 50 TABLET, FILM COATED ORAL at 08:34

## 2023-09-25 RX ADMIN — HYDROXYZINE HYDROCHLORIDE 50 MG: 50 TABLET, FILM COATED ORAL at 12:36

## 2023-09-25 RX ADMIN — QUETIAPINE FUMARATE 25 MG: 25 TABLET ORAL at 21:19

## 2023-09-25 RX ADMIN — BUSPIRONE HYDROCHLORIDE 5 MG: 5 TABLET ORAL at 08:34

## 2023-09-25 RX ADMIN — TRAZODONE HYDROCHLORIDE 50 MG: 50 TABLET ORAL at 01:25

## 2023-09-25 RX ADMIN — NICOTINE 1 PATCH: 21 PATCH, EXTENDED RELEASE TRANSDERMAL at 08:36

## 2023-09-25 RX ADMIN — FLUOXETINE HYDROCHLORIDE 20 MG: 20 CAPSULE ORAL at 08:34

## 2023-09-25 RX ADMIN — FINASTERIDE 5 MG: 5 TABLET, FILM COATED ORAL at 08:34

## 2023-09-25 RX ADMIN — TRAZODONE HYDROCHLORIDE 50 MG: 50 TABLET ORAL at 21:20

## 2023-09-25 RX ADMIN — BUSPIRONE HYDROCHLORIDE 5 MG: 5 TABLET ORAL at 21:19

## 2023-09-25 RX ADMIN — BUSPIRONE HYDROCHLORIDE 5 MG: 5 TABLET ORAL at 15:45

## 2023-09-25 NOTE — NURSING NOTE
Pt social and pleasant during interaction. Reported poor sleep overnight. Seroquel ineffective, pt needed to utilize trazodone. Pt denies SI at this time. Anxiety fluctuating, encouraged to speak with staff or ask for prns if unmanageable. Pt expressed understanding. Denies HI or hallucinations. Compliant with medication. Did not attend group due to napping during it. Denies any question or concern at this time.

## 2023-09-25 NOTE — NURSING NOTE
F/U atarax 50mg PO. Pt reports feeling increased anxiety and requested PRN. PRN effective on reassessment. Anxiety more manageable.

## 2023-09-25 NOTE — NURSING NOTE
Pt approached nursing station at approximately 0120 reporting insomnia. "The medication I received at bedtime for sleep isn't working." Pt received Seroquel at HS. Pt requested Trazodone. At 0125 Trazodone 50 mg prn was administered. Pt is advised to ask for Trazodone when taking HS medications. At this time, 0225, pt is observed laying in bed with eyes closed, respirations regular and even, no signs of distress noted. Pt is seemingly asleep. Medication is effective.

## 2023-09-25 NOTE — PROGRESS NOTES
Progress Note - Behavioral Health   Kath Perez 56 y.o. adult MRN: [id number]  Unit/Bed#: [facility] 201-01 Encounter: [id number]    Assessment:  Principal Problem:    MDD (major depressive disorder), recurrent episode, severe ([address])  Active Problems:    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    Tobacco dependence    PTSD (post-traumatic stress disorder)    Medical clearance for psychiatric admission    DASH (generalized anxiety disorder)      Plan:  --Continue with psychiatric hospitalization  --Continue with individual, group, and milieu therapy  --Continue the following medications:  Current Facility-Administered Medications   Medication Dose Route Frequency   • acetaminophen (TYLENOL) tablet 650 mg  650 mg Oral Q6H PRN   • aluminum-magnesium hydroxide-simethicone (MAALOX) oral suspension 30 mL  30 mL Oral Q4H PRN   • benztropine (COGENTIN) tablet 1 mg  1 mg Oral Q4H PRN Max 6/day   • bicalutamide (CASODEX) tablet 50 mg  50 mg Oral Daily   • busPIRone (BUSPAR) tablet 5 mg  5 mg Oral TID   • hydrOXYzine HCL (ATARAX) tablet 50 mg  50 mg Oral Q6H PRN Max 4/day    Or   • diphenhydrAMINE (BENADRYL) injection 50 mg  50 mg Intramuscular Q6H PRN   • finasteride (PROSCAR) tablet 5 mg  5 mg Oral Daily   • FLUoxetine (PROzac) capsule 20 mg  20 mg Oral Daily   • hydrOXYzine HCL (ATARAX) tablet 100 mg  100 mg Oral Q6H PRN Max 4/day    Or   • LORazepam (ATIVAN) injection 2 mg  2 mg Intramuscular Q6H PRN   • hydrOXYzine HCL (ATARAX) tablet 25 mg  25 mg Oral Q6H PRN Max 4/day   • ibuprofen (MOTRIN) tablet 400 mg  400 mg Oral Q6H PRN   • ibuprofen (MOTRIN) tablet 600 mg  600 mg Oral Q8H PRN   • nicotine (NICODERM CQ) 21 mg/24 hr TD 24 hr patch 1 patch  1 patch Transdermal Daily   • nicotine polacrilex (NICORETTE) gum 2 mg  2 mg Oral TID PRN   • OLANZapine (ZyPREXA) tablet 5 mg  5 mg Oral Q4H PRN Max 3/day    Or   • OLANZapine (ZyPREXA) IM injection 2.5 mg  2.5 mg Intramuscular Q4H PRN Max 3/day   • OLANZapine (ZyPREXA) tablet 5 mg  5 mg Oral Q3H PRN Max 3/day    Or   • OLANZapine (ZyPREXA) IM injection 5 mg  5 mg Intramuscular Q3H PRN Max 3/day   • OLANZapine (ZyPREXA) tablet 2.5 mg  2.5 mg Oral Q4H PRN Max 6/day   • progesterone vaginal suppository 200 mg  200 mg Vaginal Daily   • QUEtiapine (SEROquel) tablet 25 mg  25 mg Oral HS   • senna-docusate sodium (SENOKOT S) 8.6-50 mg per tablet 1 tablet  1 tablet Oral Daily PRN   • traZODone (DESYREL) tablet 50 mg  50 mg Oral HS PRN       Subjective: Patient was seen for continuation of care. Chart was reviewed and discussed with treatment team.     No acute behavioral events over the past 24 hours. Today, patient was seen and examined at bedside for continuation of care. Patient feels anxious and depressed. They are reporting poor sleep unresponsive to Seroquel and requiring additional Trazodone to sleep. Discussed somatic anxieties related to upcoming life stressors. Patient is tolerating medications well. Patient denied adverse effects to their psychiatric medication regimen. Patient denied other new or worsening psychiatric symptoms/complaints at this time. Discussed the importance of continuing to take medications as prescribed, as well as the importance of continuing to attend groups on the unit.      Psychiatric Review of Systems:  Medication adverse effects: none  Sleep: poor  Appetite: unchanged  Behavior over the past 24 hours: as per above    Vitals:  Vitals:    09/25/23 0740   BP: 124/74   Pulse: 57   Resp: 18   Temp: (!) 97.2 °F (36.2 °C)   SpO2:        Laboratory results:    I have personally reviewed all pertinent laboratory/tests results  Recent Results (from the past 48 hour(s))   CBC and differential    Collection Time: 09/24/23  6:25 AM   Result Value Ref Range    WBC 9.94 4.31 - 10.16 Thousand/uL    RBC 4.20 3.88 - 5.12 Million/uL    Hemoglobin 12.6 12.0 - 15.4 g/dL    Hematocrit 38.3 36.5 - 46.1 %    MCV 91 82 - 98 fL    MCH 30.0 26.8 - 34.3 pg    MCHC 32.9 31.4 - 37.4 g/dL    RDW 12.7 11.6 - 15.1 %    MPV 9.7 8.9 - 12.7 fL    Platelets 943 001 - 269 Thousands/uL    nRBC 0 /100 WBCs    Neutrophils Relative 54 43 - 75 %    Immat GRANS % 1 0 - 2 %    Lymphocytes Relative 34 14 - 44 %    Monocytes Relative 6 4 - 12 %    Eosinophils Relative 4 0 - 6 %    Basophils Relative 1 0 - 1 %    Neutrophils Absolute 5.48 1.85 - 7.62 Thousands/µL    Immature Grans Absolute 0.05 0.00 - 0.20 Thousand/uL    Lymphocytes Absolute 3.34 0.60 - 4.47 Thousands/µL    Monocytes Absolute 0.58 0.17 - 1.22 Thousand/µL    Eosinophils Absolute 0.38 0.00 - 0.61 Thousand/µL    Basophils Absolute 0.11 (H) 0.00 - 0.10 Thousands/µL   Comprehensive metabolic panel    Collection Time: 09/24/23  6:25 AM   Result Value Ref Range    Sodium 138 135 - 147 mmol/L    Potassium 4.1 3.5 - 5.3 mmol/L    Chloride 106 96 - 108 mmol/L    CO2 27 21 - 32 mmol/L    ANION GAP 5 mmol/L    BUN 12 5 - 25 mg/dL    Creatinine 0.77 0.60 - 1.30 mg/dL    Glucose 95 65 - 140 mg/dL    Glucose, Fasting 95 65 - 99 mg/dL    Calcium 8.9 8.4 - 10.2 mg/dL    AST 15 5 - 45 U/L    ALT 17 7 - 52 U/L    Alkaline Phosphatase 44 34 - 104 U/L    Total Protein 6.0 (L) 6.4 - 8.4 g/dL    Albumin 3.7 3.5 - 5.0 g/dL    Total Bilirubin 0.31 0.20 - 1.00 mg/dL    eGFR     hCG, serum, qualitative    Collection Time: 09/24/23  6:25 AM   Result Value Ref Range    Preg, Serum Negative Negative   Lipid panel    Collection Time: 09/24/23  6:25 AM   Result Value Ref Range    Cholesterol 165 See Comment mg/dL    Triglycerides 280 (H) See Comment mg/dL    HDL, Direct 35 (L) >=40 mg/dL    LDL Calculated 74 0 - 100 mg/dL    Non-HDL-Chol (CHOL-HDL) 130 mg/dl        Current Medications:  Current medications as per above. All medications have been reviewed. Risks, benefits, alternatives, and possible side effects of patient's psychiatric medications were discussed with patient.      Mental Status Evaluation:  Appearance: casually dressed, consistent with stated age  Motor: +psychomotor retardation, no gait abnormalities  Behavior: cooperative, answers questions appropriately  Speech: soft, normal rhythm  Mood: "anxious and depressed"  Affect: constricted, depressed-appearing  Thought Process: linear and goal-oriented  Thought Content: denies auditory hallucinations, denies visual hallucinations, denies delusions  Risk Potential: denies suicidal ideation, plan, or intent. Denies homicidal ideation  Sensorium: Oriented to person, place, time, and situation  Cognition: cognitive ability appears intact but was not quantitatively tested  Consciousness: alert and awake  Attention: intact, able to focus without difficulty  Insight: fair  Judgement: limited        Progress Toward Goals & Illness Status: Patient is not at goal. They are not yet ready for discharge. The patient's condition currently requires active psychopharmacological medication management, interdisciplinary coordination with case management, and the utilization of adjunctive milieu and group therapy to augment psychopharmacological efficacy. The patient's risk of morbidity, and progression or decompensation of psychiatric disease, is higher without this current treatment. This note has been constructed using a voice recognition system. There may be translation, syntax, or grammatical errors. If you have any questions, please contact the dictating provider.

## 2023-09-25 NOTE — NURSING NOTE
Pt has been observed laying in bed with eyes closed for the last 4 hours. She appears to be sleeping. Q 7 min safety checks maintained.

## 2023-09-25 NOTE — NURSING NOTE
Pt walking the halls at times. Pleasant and cooperative during interaction. Denies SI/HI or hallucination. Reports anxiety and racing thoughts throughout the day. Misses best friends parent who passed away tragically. Utilizing grounding as a coping skill. Attending groups and compliant with medication. Worried about outside stressors like work. Hopeful for treatment.

## 2023-09-26 PROCEDURE — 99232 SBSQ HOSP IP/OBS MODERATE 35: CPT | Performed by: PSYCHIATRY & NEUROLOGY

## 2023-09-26 RX ADMIN — FINASTERIDE 5 MG: 5 TABLET, FILM COATED ORAL at 09:03

## 2023-09-26 RX ADMIN — FLUOXETINE HYDROCHLORIDE 20 MG: 20 CAPSULE ORAL at 09:04

## 2023-09-26 RX ADMIN — BUSPIRONE HYDROCHLORIDE 5 MG: 5 TABLET ORAL at 21:36

## 2023-09-26 RX ADMIN — HYDROXYZINE HYDROCHLORIDE 100 MG: 50 TABLET, FILM COATED ORAL at 23:35

## 2023-09-26 RX ADMIN — BUSPIRONE HYDROCHLORIDE 5 MG: 5 TABLET ORAL at 16:24

## 2023-09-26 RX ADMIN — BUSPIRONE HYDROCHLORIDE 5 MG: 5 TABLET ORAL at 09:03

## 2023-09-26 RX ADMIN — TRAZODONE HYDROCHLORIDE 50 MG: 50 TABLET ORAL at 21:53

## 2023-09-26 RX ADMIN — NICOTINE 1 PATCH: 21 PATCH, EXTENDED RELEASE TRANSDERMAL at 09:05

## 2023-09-26 RX ADMIN — BICALUTAMIDE 50 MG: 50 TABLET, FILM COATED ORAL at 09:03

## 2023-09-26 NOTE — DISCHARGE INSTR - OTHER ORDERS
Welcome to the [address]! We are a chapter of  the National Depression and [address] Our Vision  The Depression and Bipolar Support [place] (DBSA) envisions wellness for people living with mood disorders (depression and bipolar disorder). Our [place]  DBSA provides hope, help, support, and education to improve the lives of people who have mood disorders. [facility]  [address] [place], [place], [address]    In Person:  Every Wednesday 7:00 PM to 8:30 PM  inside the Evangelical starting in room 115  **MASKS ARE OPTIONAL**  Please contact our Ramirez Madden,  at [email or web address] to be put on our email list to stay up to date about  meetings, current events within DBSA-, outreach, facilitator trainings, and more! Friends and families are welcome! Included in discharge folder is some grief support group information. Crisis Intervention services are available 24 hours a day by calling [phone]. The crisis unit is located at [address] [place] [address]. Services include telephone counseling, mobile crisis, walk-in crisis, and assistance in accessing inpatient care and short-term crisis residential services. The PEER LINE is a toll-free phone number for people in [facility] who are seeking a listening ear for additional support in their recovery from mental illness. The PEER LINE is peer-run and peer-friendly. Callers to the [address] [place] [address] will speak directly to other individuals who have experienced the mental health recovery process. Hours of operation: 8:30 am - 4:30 pm, Monday through Friday [phone] ([phone])   Rec[facility]  [facility] [place]  [address] [place] [address]    Text CONNECT to [phone] from anywhere in the [facility], anytime, about any type of crisis.   A live, trained Crisis Counselor receives the text and lets you know that they are here to listen. The volunteer Crisis Counselor will help you move from a hot moment to a cool moment. Warm Line: [phone], [phone], [phone]  If it is not quite a crisis, but you want to talk to someone, 24 hours/day, 7 days/week:  Someone to listen; someone who cares. The [facility], [facility] ([place]) offers various education & support groups for you & your family. For more information visit their website at http://www.Attunity/.    Dial 2-1-1 to get connected/get help. Free, confidential information & referral available 24/7: Aging Services, Child & Youth Services, Counseling, Education/Training, Food/Shelter/Clothing, Health Services, Parenting, Substance Abuse, Support Groups, Volunteer Opportunities, & much more. Phone: 2-1-1 or [phone], Web: Bartolo Tran, Email: [email or web address]    The [place] are open to all mental health consumers in [facility] who are interested in meeting people and making new friends. They provide a friendly social atmosphere with scheduled daily activities including games, arts & crafts, discussion and education groups, vocational activities, and much more. Light refreshments are served daily. The locations are:    [facility] - operated by [email or web address]   [address] [place] [address]   Phone: [phone]   Fax: [phone]   E-mail: [email or web address]  Hours of Operation:  Monday 3:00 PM - 8:00 PM  Tuesday 12:00 PM - 8:00 PM   Wednesday 3:00 PM - 8:00 PM  Thursday 12:00 PM - 8:00 PM   Friday 3:00 PM - 8:00 PM   Saturday Closed   [address]  - operated by [facility]  [address] [facility] [address]  Phone: [phone]  Fax: [phone]  Hours of Operation  Monday 12:00 PM - 8:00 PM  Tuesday 12:00 PM - 8:00 PM  Wednesday 12:00 PM - 8:00 PM   Thursday 12:00 PM - 8:00 PM  Friday 12:00 PM - 9:00 PM   Saturday 11:00 AM - 4:00 PM  Elida Lowe transportation is available on scheduled days for transportation. Psych Rehab Program:  Modeled after the [facility] [address]  program in [place], the [employer] offers consumers interested in fulfilling work a guaranteed place to come, to belong, and to enjoy meaningful relationships as they seek the confidence and skills necessary to lead vocationally productive and socially satisfying lives. Here is their contact information:  [facility], [address]  Phone: [phone]  [email or web address]  This site is open Monday thru Thursday from 8:30 am till 4:00 pm and Fridays from 8:30 am till 3:00 pm. Consumers are encouraged to stop by for a visit. After-hour social activities are also scheduled. Support & Referral Helpline  Support and Referral Helpline is a 24-hour, 7 days-a-week, listening and referral service provided to all of [place]. Please call [phone] or tty [phone] to speak with one of our specialists. The helpline is possible through partnerships with the [address] [place] [address] [employer]. The [address] [place] [address] (formerly known as the [employer]) provides free and confidential emotional support to people in suicidal crisis or emotional distress 24 hours a day, 7 days a week, across the [facility]. The Lifeline is comprised of a national network of over 200 local crisis centers, combining custom local care and resources with national standards and best practices.

## 2023-09-26 NOTE — NURSING NOTE
Patient visible and social throughout the day, denies any current concerns. Pt reports enjoying the pharmacy group and learned a few things about her meds. RN acknowledged and encouraged continued group attendance while here. Pt denies SI, HI and AVH.  Reports "feeling good today."

## 2023-09-26 NOTE — PROGRESS NOTES
Progress Note - Behavioral Health   Kath Perez 56 y.o. adult MRN: [id number]  Unit/Bed#: [facility] 201-01 Encounter: [id number]    Assessment:  Principal Problem:    MDD (major depressive disorder), recurrent episode, severe ([address])  Active Problems:    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    Tobacco dependence    PTSD (post-traumatic stress disorder)    Medical clearance for psychiatric admission    DASH (generalized anxiety disorder)      Plan:  --Discontinue Seroquel  --Continue with psychiatric hospitalization  --Continue with individual, group, and milieu therapy  --Continue the following medications:  Current Facility-Administered Medications   Medication Dose Route Frequency   • acetaminophen (TYLENOL) tablet 650 mg  650 mg Oral Q6H PRN   • aluminum-magnesium hydroxide-simethicone (MAALOX) oral suspension 30 mL  30 mL Oral Q4H PRN   • benztropine (COGENTIN) tablet 1 mg  1 mg Oral Q4H PRN Max 6/day   • bicalutamide (CASODEX) tablet 50 mg  50 mg Oral Daily   • busPIRone (BUSPAR) tablet 5 mg  5 mg Oral TID   • hydrOXYzine HCL (ATARAX) tablet 50 mg  50 mg Oral Q6H PRN Max 4/day    Or   • diphenhydrAMINE (BENADRYL) injection 50 mg  50 mg Intramuscular Q6H PRN   • finasteride (PROSCAR) tablet 5 mg  5 mg Oral Daily   • FLUoxetine (PROzac) capsule 20 mg  20 mg Oral Daily   • hydrOXYzine HCL (ATARAX) tablet 100 mg  100 mg Oral Q6H PRN Max 4/day    Or   • LORazepam (ATIVAN) injection 2 mg  2 mg Intramuscular Q6H PRN   • hydrOXYzine HCL (ATARAX) tablet 25 mg  25 mg Oral Q6H PRN Max 4/day   • ibuprofen (MOTRIN) tablet 400 mg  400 mg Oral Q6H PRN   • ibuprofen (MOTRIN) tablet 600 mg  600 mg Oral Q8H PRN   • nicotine (NICODERM CQ) 21 mg/24 hr TD 24 hr patch 1 patch  1 patch Transdermal Daily   • nicotine polacrilex (NICORETTE) gum 2 mg  2 mg Oral TID PRN   • OLANZapine (ZyPREXA) tablet 5 mg  5 mg Oral Q4H PRN Max 3/day    Or   • OLANZapine (ZyPREXA) IM injection 2.5 mg  2.5 mg Intramuscular Q4H PRN Max 3/day   • OLANZapine (ZyPREXA) tablet 5 mg  5 mg Oral Q3H PRN Max 3/day    Or   • OLANZapine (ZyPREXA) IM injection 5 mg  5 mg Intramuscular Q3H PRN Max 3/day   • OLANZapine (ZyPREXA) tablet 2.5 mg  2.5 mg Oral Q4H PRN Max 6/day   • progesterone vaginal suppository 200 mg  200 mg Vaginal Daily   • senna-docusate sodium (SENOKOT S) 8.6-50 mg per tablet 1 tablet  1 tablet Oral Daily PRN   • traZODone (DESYREL) tablet 50 mg  50 mg Oral HS PRN       Subjective: Patient was seen for continuation of care. Chart was reviewed and discussed with treatment team.     No acute behavioral events over the past 24 hours. Today, patient was seen and examined at bedside for continuation of care. Today, the patient states that they are feeling a bit anxious but attributes that to hormonal changes since not receiving hormones like they normally do. Patient's mood is controlled. We discussed discontinuing Seroquel as the patient has had better success with trazodone for sleep. Patient denied adverse effects to their psychiatric medication regimen. Patient denied other new or worsening psychiatric symptoms/complaints at this time. Discussed the importance of continuing to take medications as prescribed, as well as the importance of continuing to attend groups on the unit.      Psychiatric Review of Systems:  Medication adverse effects: none  Sleep: unchanged  Appetite: unchanged  Behavior over the past 24 hours: as per above    Vitals:  Vitals:    09/26/23 0731   BP: 142/92   Pulse: 64   Resp: 18   Temp: (!) 96.6 °F (35.9 °C)   SpO2:        Laboratory results:    I have personally reviewed all pertinent laboratory/tests results  Recent Results (from the past 48 hour(s))   TSH, 3rd generation with Free T4 reflex    Collection Time: 09/25/23  6:10 AM   Result Value Ref Range    TSH 3RD GENERATON 2.099 0.450 - 4.500 uIU/mL   Folate    Collection Time: 09/25/23  6:10 AM   Result Value Ref Range    Folate 8.3 >5.9 ng/mL   Vitamin B12    Collection Time: 09/25/23 6:10 AM   Result Value Ref Range    Vitamin B-12 171 (L) 180 - 914 pg/mL   Vitamin D 25 hydroxy    Collection Time: 09/25/23  6:10 AM   Result Value Ref Range    Vit D, 25-Hydroxy 33.1 30.0 - 100.0 ng/mL        Current Medications:  Current medications as per above. All medications have been reviewed. Risks, benefits, alternatives, and possible side effects of patient's psychiatric medications were discussed with patient. Mental Status Evaluation:  Appearance: casually dressed, appears consistent with stated age  Motor: no psychomotor disturbances, no gait abnormalities  Behavior: cooperative, interacts with this writer appropriately  Speech: normal rate, rhythm, and volume  Mood: "okay"  Affect: euthymic, normal range and intensity  Thought Process: organized, linear, and goal-oriented  Thought Content: denies auditory hallucinations, denies visual hallucinations, denies delusions  Risk Potential: denies suicidal ideation, plan, or intent. Denies homicidal ideation  Sensorium: Oriented to person, place, time, and situation  Cognition: cognitive ability appears intact but was not quantitatively tested  Consciousness: alert and awake  Attention: able to focus without difficulty  Insight: improved  Judgement: improved      Progress Toward Goals & Illness Status: Patient is not at goal. They are not yet ready for discharge. The patient's condition currently requires active psychopharmacological medication management, interdisciplinary coordination with case management, and the utilization of adjunctive milieu and group therapy to augment psychopharmacological efficacy. The patient's risk of morbidity, and progression or decompensation of psychiatric disease, is higher without this current treatment. This note has been constructed using a voice recognition system. There may be translation, syntax, or grammatical errors. If you have any questions, please contact the dictating provider.

## 2023-09-26 NOTE — CASE MANAGEMENT
CM used the internet to search for clinical and counseling psychologist and psychiatrist, in-network with [employer] insurance; CM printed the list.     CM met with Pt to review printed list of providers and she asked about further clarifying LBG+. CM got a laptop computer and assisted Pt with looking up providers furthers. Pt said that [place] for [facility] looked promising. CM also searched using psychology today & Pt asked Sanjay Shea to be contacted; ROIs obtained. CM assisted Pt with using a cordless phone to contact [employer] to file STD/FMLA. Pt waited on hold for a period of time & eventually got to talk to a representative & claim was filed; CM provided her phone number, fax number, and email. CM contacted Sanjay Shea @ [phone] opt 0 & reached voicemail that the doctor is not currently accepting new patients. CM contacted [address] @ [phone] [facility] for [facility] the . CM reached voicemail & left a message. CM contacted Pt's PCP, Dr. Lani Aguilar @ [phone] & the first available [facility] appointment is October 9, 2023 at 2:00 PM.  CM reviewed that Pt would like to be referred for [facility]. CM confirmed fax number [phone] to [facility].

## 2023-09-26 NOTE — PROGRESS NOTES
Status: Pt is pleasant & cooperative, walking the halls at time. Pt denies any SI/HI or hallucinations, & attends groups. Pt received PRN & slept overnight. Medication: no changes / PRN - Atarax & Trazodone  D/C: Unknown / Pt would like referrals for outpatient, but not interested in PHP due to work. 09/26/23 0750   Team Meeting   Meeting Type Daily Rounds   Team Members Present   Team Members Present Physician;Nurse; Other (Discipline and Name);    Physician Team Member Dr. Carly Gutierrez / Dr. Raymond Garg / Macario Abdi / Edward Sainz Team Member Srikanth Figueroa / Bernardinolm Mann / [facility] Management Team Member Cassius Arambula / Vandana Duggan / Benjamin Cummins / Britt Sosa   Other (Discipline and Name) Omero(art therapy) / Ivan(group faciltator) / Dr. Ian Cortez)   Patient/Family Present   Patient Present No   Patient's Family Present No

## 2023-09-26 NOTE — DISCHARGE INSTR - APPOINTMENTS
On September 26, 2023, you were able to make contact with [employer], claim #: [id number]. Forms were completed & faxed back on 9/28/2023, stating your return to work date is 10/10/2023, without limitations. Liseth Stern or Nayeli, our Loganm and Jerilyn, will be calling you after your discharge, on the phone number that you provided. They will be available as an additional support, if needed. If you wish to speak with one of them, you may contact Liseth Stern at [phone] or Kenny Trujillo at [phone]. You are being discharged home to [address] [place] [address] Gael Obrien, [address] [place] [address]. You provided cell phone number [phone] as the best way to contact you. Included in discharge folder is a listing of therapist from [facility] listed as being in-network with your insurance, trauma focused, and LGBTQ+    Included in discharge folder is a listing of therapist from [facility] website.

## 2023-09-26 NOTE — NURSING NOTE
Patient observed resting with eyes closed throughout the night, without periods of restlessness observed. Patient still resting now. Observational rounds maintained for promotion of patient safety.

## 2023-09-27 PROCEDURE — 99232 SBSQ HOSP IP/OBS MODERATE 35: CPT | Performed by: PSYCHIATRY & NEUROLOGY

## 2023-09-27 RX ORDER — TRAZODONE HYDROCHLORIDE 100 MG/1
100 TABLET ORAL
Status: DISCONTINUED | OUTPATIENT
Start: 2023-09-27 | End: 2023-09-28 | Stop reason: HOSPADM

## 2023-09-27 RX ADMIN — NICOTINE 1 PATCH: 21 PATCH, EXTENDED RELEASE TRANSDERMAL at 08:16

## 2023-09-27 RX ADMIN — FLUOXETINE HYDROCHLORIDE 20 MG: 20 CAPSULE ORAL at 08:16

## 2023-09-27 RX ADMIN — BUSPIRONE HYDROCHLORIDE 5 MG: 5 TABLET ORAL at 08:16

## 2023-09-27 RX ADMIN — BUSPIRONE HYDROCHLORIDE 5 MG: 5 TABLET ORAL at 21:38

## 2023-09-27 RX ADMIN — TRAZODONE HYDROCHLORIDE 100 MG: 100 TABLET ORAL at 21:38

## 2023-09-27 RX ADMIN — BICALUTAMIDE 50 MG: 50 TABLET, FILM COATED ORAL at 08:16

## 2023-09-27 RX ADMIN — FINASTERIDE 5 MG: 5 TABLET, FILM COATED ORAL at 08:16

## 2023-09-27 RX ADMIN — BUSPIRONE HYDROCHLORIDE 5 MG: 5 TABLET ORAL at 15:30

## 2023-09-27 RX ADMIN — HYDROXYZINE HYDROCHLORIDE 50 MG: 50 TABLET, FILM COATED ORAL at 15:30

## 2023-09-27 NOTE — NURSING NOTE
Patient requested and received Trazodone 50 mg PO for sleep at 2153. PRN was effective. Patient is sleeping.

## 2023-09-27 NOTE — PLAN OF CARE
Problem: SELF HARM/SUICIDALITY  Goal: Will have no self-injury during hospital stay  Description: INTERVENTIONS:  - Q 15 MINUTES: Routine safety checks  - Q WAKING SHIFT & PRN: Assess risk to determine if routine checks are adequate to maintain patient safety  - Encourage patient to participate actively in care by formulating a plan to combat response to suicidal ideation, identify supports and resources  Outcome: Progressing     Problem: DEPRESSION  Goal: Will be euthymic at discharge  Description: INTERVENTIONS:  - Administer medication as ordered  - Provide emotional support via 1:1 interaction with staff  - Encourage involvement in milieu/groups/activities  - Monitor for social isolation  Outcome: Progressing     Problem: ANXIETY  Goal: Will report anxiety at manageable levels  Description: INTERVENTIONS:  - Administer medication as ordered  - Teach and encourage coping skills  - Provide emotional support  - Assess patient/family for anxiety and ability to cope  Outcome: Progressing  Goal: By discharge: Patient will verbalize 2 strategies to deal with anxiety  Description: Interventions:  - Identify any obvious source/trigger to anxiety  - Staff will assist patient in applying identified coping technique/skills  - Encourage attendance of scheduled groups and activities  Outcome: Progressing     Problem: SLEEP DISTURBANCE  Goal: Will exhibit normal sleeping pattern  Description: Interventions:  -  Assess the patients sleep pattern, noting recent changes  - Administer medication as ordered  - Decrease environmental stimuli, including noise, as appropriate during the night  - Encourage the patient to actively participate in unit groups and or exercise during the day to enhance ability to achieve adequate sleep at night  - Assess the patient, in the morning, encouraging a description of sleep experience  Outcome: Progressing Well developed

## 2023-09-27 NOTE — NURSING NOTE
Patient woke up and requested and received Atarax 100 mg PO at 2335 for severe anxiety. Patient mentioned hearing banging on the unit. PRN was effective patient is sleeping.

## 2023-09-27 NOTE — NURSING NOTE
Pt active on unit, attending groups and socializing with peers. Calm and cooperative. @8514 Reports an increase in anxiety. Lucero score 24. PRN Atarax 50mg po given. Pt states medication effective, anxiety has decreased. Denies SI/HI/AVH.

## 2023-09-27 NOTE — PROGRESS NOTES
Status: Pt is social and pleasant, attending groups. Pt denied any SI/HI or hallucinations, but requested & received PRN for anxiety. Pt also received PRN for sleep which was effective, until she was awoken by a peer; Pt able to return to sleep. Medication: Seroquel d/c'd / PRN - Atarax & Trazodone  D/C: Friday / FMLA forms received this morning via fax.      09/27/23 2807   Team Meeting   Meeting Type Daily Rounds   Team Members Present   Team Members Present Physician;Nurse; Other (Discipline and Name);    Physician Team Member Dr. Mariposa Copeland / Dr. Bradly Norman / Jann Lui / Zoila Enter Team Member Dottie Araiza / [facility] Management Team Member Kimmie Ellsworth / Pamella Verduzco / Diane Esquivel   Other (Discipline and Name) Omero(art therapy) / Ivan(group facilitator)   Patient/Family Present   Patient Present No   Patient's Family Present No

## 2023-09-27 NOTE — NURSING NOTE
Pt remains pleasant, calm and cooperative. Social with peers on the unit and attending all groups throughout the day. Pt states feeling thankful with treatment, expressed overall an improvement with mood and denies SI/HI. Pt states having some difficulty sleeping overnight due to disruptive peer.

## 2023-09-27 NOTE — PROGRESS NOTES
Progress Note - Behavioral Health   Kath Perez 56 y.o. adult MRN: [id number]  Unit/Bed#: [facility] 201-01 Encounter: [id number]    Assessment:  Principal Problem:    MDD (major depressive disorder), recurrent episode, severe ([address])  Active Problems:    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    Tobacco dependence    PTSD (post-traumatic stress disorder)    Medical clearance for psychiatric admission    DASH (generalized anxiety disorder)      Plan:  --Increase Trazodone to 100mg PO HS for sleep  --Continue with psychiatric hospitalization  --Continue with individual, group, and milieu therapy  --Continue the following medications:  Current Facility-Administered Medications   Medication Dose Route Frequency   • acetaminophen (TYLENOL) tablet 650 mg  650 mg Oral Q6H PRN   • aluminum-magnesium hydroxide-simethicone (MAALOX) oral suspension 30 mL  30 mL Oral Q4H PRN   • benztropine (COGENTIN) tablet 1 mg  1 mg Oral Q4H PRN Max 6/day   • bicalutamide (CASODEX) tablet 50 mg  50 mg Oral Daily   • busPIRone (BUSPAR) tablet 5 mg  5 mg Oral TID   • hydrOXYzine HCL (ATARAX) tablet 50 mg  50 mg Oral Q6H PRN Max 4/day    Or   • diphenhydrAMINE (BENADRYL) injection 50 mg  50 mg Intramuscular Q6H PRN   • finasteride (PROSCAR) tablet 5 mg  5 mg Oral Daily   • FLUoxetine (PROzac) capsule 20 mg  20 mg Oral Daily   • hydrOXYzine HCL (ATARAX) tablet 100 mg  100 mg Oral Q6H PRN Max 4/day    Or   • LORazepam (ATIVAN) injection 2 mg  2 mg Intramuscular Q6H PRN   • hydrOXYzine HCL (ATARAX) tablet 25 mg  25 mg Oral Q6H PRN Max 4/day   • ibuprofen (MOTRIN) tablet 400 mg  400 mg Oral Q6H PRN   • ibuprofen (MOTRIN) tablet 600 mg  600 mg Oral Q8H PRN   • nicotine (NICODERM CQ) 21 mg/24 hr TD 24 hr patch 1 patch  1 patch Transdermal Daily   • nicotine polacrilex (NICORETTE) gum 2 mg  2 mg Oral TID PRN   • OLANZapine (ZyPREXA) tablet 5 mg  5 mg Oral Q4H PRN Max 3/day    Or   • OLANZapine (ZyPREXA) IM injection 2.5 mg  2.5 mg Intramuscular Q4H PRN Max 3/day • OLANZapine (ZyPREXA) tablet 5 mg  5 mg Oral Q3H PRN Max 3/day    Or   • OLANZapine (ZyPREXA) IM injection 5 mg  5 mg Intramuscular Q3H PRN Max 3/day   • OLANZapine (ZyPREXA) tablet 2.5 mg  2.5 mg Oral Q4H PRN Max 6/day   • progesterone vaginal suppository 200 mg  200 mg Vaginal Daily   • senna-docusate sodium (SENOKOT S) 8.6-50 mg per tablet 1 tablet  1 tablet Oral Daily PRN   • traZODone (DESYREL) tablet 50 mg  50 mg Oral HS PRN       Subjective: Patient was seen for continuation of care. Chart was reviewed and discussed with treatment team.     No acute behavioral events over the past 24 hours. Today, patient was seen and examined at bedside for continuation of care. Today, patient is doing well. She is w/o complaints. She stated that she would prefer 100mg of Trazodone for sleep. She is future oriented and looking forward to discussed/tenative DC on Friday. Patient denied adverse effects to their psychiatric medication regimen. Patient denied other new or worsening psychiatric symptoms/complaints at this time. Discussed the importance of continuing to take medications as prescribed, as well as the importance of continuing to attend groups on the unit. Psychiatric Review of Systems:  Medication adverse effects: none  Sleep: unchanged  Appetite: unchanged  Behavior over the past 24 hours: as per above    Vitals:  Vitals:    09/27/23 0716   BP: 141/90   Pulse: 67   Resp: 18   Temp: 98.3 °F (36.8 °C)   SpO2:        Laboratory results:    I have personally reviewed all pertinent laboratory/tests results  No results found for this or any previous visit (from the past 48 hour(s)). Current Medications:  Current medications as per above. All medications have been reviewed. Risks, benefits, alternatives, and possible side effects of patient's psychiatric medications were discussed with patient.      Mental Status Evaluation:  Appearance: casually dressed, appears consistent with stated age  Motor: no psychomotor disturbances, no gait abnormalities  Behavior: cooperative, interacts with this writer appropriately  Speech: normal rate, rhythm, and volume  Mood: "good"  Affect: euthymic, normal range and intensity  Thought Process: organized, linear, and goal-oriented  Thought Content: denies auditory hallucinations, denies visual hallucinations, denies delusions  Risk Potential: denies suicidal ideation, plan, or intent. Denies homicidal ideation  Sensorium: Oriented to person, place, time, and situation  Cognition: cognitive ability appears intact but was not quantitatively tested  Consciousness: alert and awake  Attention: able to focus without difficulty  Insight: improved  Judgement: improved      Progress Toward Goals & Illness Status: Patient is not at goal. They are not yet ready for discharge. The patient's condition currently requires active psychopharmacological medication management, interdisciplinary coordination with case management, and the utilization of adjunctive milieu and group therapy to augment psychopharmacological efficacy. The patient's risk of morbidity, and progression or decompensation of psychiatric disease, is higher without this current treatment. This note has been constructed using a voice recognition system. There may be translation, syntax, or grammatical errors. If you have any questions, please contact the dictating provider.

## 2023-09-27 NOTE — CASE MANAGEMENT
CM contacted [place] for [facility] at [phone] & was transferred to the intake line; CM left a 2nd message seeking to schedule Pt an intake. CM met with Pt to review d/c plans. CM reviewed Pt has a PCP appointment on 10/9 at 2 PM. & Pt expressed concern about being able to get off of work for the appointment. Pt expressed a desire to see a therapist before returning to work too. CM reviewed that [employer] is a virtual platform, with some in-person availability, however, Pt would have to call herself, as they require a credit card to hold appointments, due to cancellation policies. Pt said that she wanted to double check a list of providers from a LGBTQ+ listing she had at home. CM & Pt talked about her returning to work on 10/10, so that she can see her PCP on 10/9, for medication follow-up. CM spoke to Pt about PHP, since she planned to stay out of work for a week? Pt declined stating she felt 1:1 therapy was more helpful and she didn't want to be out of work for 2-3 weeks which would be required for PHP. CM had received forms from [facility] signed them. CM assisted the attending physician with completing the remainder of the forms. CM electronically sent transportation request: ETA [id number] via [employer].

## 2023-09-28 VITALS
TEMPERATURE: 98 F | OXYGEN SATURATION: 98 % | HEART RATE: 67 BPM | SYSTOLIC BLOOD PRESSURE: 159 MMHG | BODY MASS INDEX: 23.49 KG/M2 | HEIGHT: 71 IN | RESPIRATION RATE: 18 BRPM | DIASTOLIC BLOOD PRESSURE: 88 MMHG | WEIGHT: 167.8 LBS

## 2023-09-28 PROCEDURE — 99232 SBSQ HOSP IP/OBS MODERATE 35: CPT | Performed by: PSYCHIATRY & NEUROLOGY

## 2023-09-28 RX ORDER — FLUOXETINE HYDROCHLORIDE 20 MG/1
20 CAPSULE ORAL DAILY
Qty: 30 CAPSULE | Refills: 0 | Status: SHIPPED | OUTPATIENT
Start: 2023-09-28 | End: 2023-10-28

## 2023-09-28 RX ORDER — TRAZODONE HYDROCHLORIDE 100 MG/1
100 TABLET ORAL
Qty: 30 TABLET | Refills: 0 | Status: SHIPPED | OUTPATIENT
Start: 2023-09-28 | End: 2023-10-28

## 2023-09-28 RX ORDER — BUSPIRONE HYDROCHLORIDE 5 MG/1
5 TABLET ORAL 3 TIMES DAILY
Qty: 90 TABLET | Refills: 0 | Status: SHIPPED | OUTPATIENT
Start: 2023-09-28 | End: 2023-10-28

## 2023-09-28 RX ADMIN — BICALUTAMIDE 50 MG: 50 TABLET, FILM COATED ORAL at 08:16

## 2023-09-28 RX ADMIN — FINASTERIDE 5 MG: 5 TABLET, FILM COATED ORAL at 08:16

## 2023-09-28 RX ADMIN — BUSPIRONE HYDROCHLORIDE 5 MG: 5 TABLET ORAL at 08:16

## 2023-09-28 RX ADMIN — NICOTINE 1 PATCH: 21 PATCH, EXTENDED RELEASE TRANSDERMAL at 08:16

## 2023-09-28 RX ADMIN — FLUOXETINE HYDROCHLORIDE 20 MG: 20 CAPSULE ORAL at 08:16

## 2023-09-28 NOTE — PROGRESS NOTES
Status: Pt is pleasant & cooperative, attending groups. Pt denied any SI/HI or hallucinations. Pt took PRN & appeared to have slept overnight. Medication: no changes / PRN - Atarax & Trazodone  D/C: Today - via [employer] at 1000 / Pt has a follow-up with her PCP on 10/9. CM left 2 msg for [place] for [facility], however, at this time, they have not called back. Pt will be provided their contact information, along with other list of LGBTQ+ therapist, per her request.       09/28/23 0750   Team Meeting   Meeting Type Daily Rounds   Team Members Present   Team Members Present Physician;Nurse; Other (Discipline and Name);    Physician Team Member Dr. Marya Gonzalez / Dr. Ana Laura Gan / Kaylin Skinner / Darrin Gomez Team Member Tristen Wayne / [facility] Management Team Member JOLLY / Merlin Carter / Yamile Carty   Other (Discipline and Name) Omero(art therapy) / Ivan(group facilitator)   Patient/Family Present   Patient Present No   Patient's Family Present No

## 2023-09-28 NOTE — BH TRANSITION RECORD
Contact Information: If you have any questions, concerns, pended studies, tests and/or procedures, or emergencies regarding your inpatient behavioral health visit. Please contact [facility] behavioral health unit [phone] and ask to speak to a , nurse or physician. A contact is available 24 hours/ 7 days a week at this number. Summary of Procedures Performed During your Stay:  Below is a list of major procedures performed during your hospital stay and a summary of results:  - No major procedures performed. Pending Studies (From admission, onward)    None        Please follow up on the above pending studies with your PCP and/or referring provider.

## 2023-09-28 NOTE — NURSING NOTE
F/U to Trazodone administration at 2138 hrs as a sleep aid. Patient appeared to be asleep, no distress observed.

## 2023-09-28 NOTE — NURSING NOTE
Pt remains pleasant and calm. Denies SI/HI and reports feeling that their mood has improved since admission. Pt remains social with peers, reports sleeping has improved. Denies any questions and expresses readiness for discharge.

## 2023-09-28 NOTE — PLAN OF CARE
Problem: SELF HARM/SUICIDALITY  Goal: Will have no self-injury during hospital stay  Description: INTERVENTIONS:  - Q 15 MINUTES: Routine safety checks  - Q WAKING SHIFT & PRN: Assess risk to determine if routine checks are adequate to maintain patient safety  - Encourage patient to participate actively in care by formulating a plan to combat response to suicidal ideation, identify supports and resources  Outcome: Adequate for Discharge     Problem: DEPRESSION  Goal: Will be euthymic at discharge  Description: INTERVENTIONS:  - Administer medication as ordered  - Provide emotional support via 1:1 interaction with staff  - Encourage involvement in milieu/groups/activities  - Monitor for social isolation  Outcome: Adequate for Discharge     Problem: ANXIETY  Goal: Will report anxiety at manageable levels  Description: INTERVENTIONS:  - Administer medication as ordered  - Teach and encourage coping skills  - Provide emotional support  - Assess patient/family for anxiety and ability to cope  Outcome: Adequate for Discharge  Goal: By discharge: Patient will verbalize 2 strategies to deal with anxiety  Description: Interventions:  - Identify any obvious source/trigger to anxiety  - Staff will assist patient in applying identified coping technique/skills  - Encourage attendance of scheduled groups and activities  Outcome: Adequate for Discharge     Problem: SLEEP DISTURBANCE  Goal: Will exhibit normal sleeping pattern  Description: Interventions:  -  Assess the patients sleep pattern, noting recent changes  - Administer medication as ordered  - Decrease environmental stimuli, including noise, as appropriate during the night  - Encourage the patient to actively participate in unit groups and or exercise during the day to enhance ability to achieve adequate sleep at night  - Assess the patient, in the morning, encouraging a description of sleep experience  Outcome: Adequate for Discharge     Problem: DISCHARGE PLANNING  Goal: Discharge to home or other facility with appropriate resources  Description: INTERVENTIONS:  - Identify barriers to discharge w/patient and caregiver  - Arrange for needed discharge resources and transportation as appropriate  - Identify discharge learning needs (meds, wound care, etc.)  - Arrange for interpretive services to assist at discharge as needed  - Refer to Case Management Department for coordinating discharge planning if the patient needs post-hospital services based on physician/advanced practitioner order or complex needs related to functional status, cognitive ability, or social support system  Outcome: Adequate for Discharge     Problem: Ineffective Coping  Goal: Participates in unit activities  Description: Interventions:  - Provide therapeutic environment   - Provide required programming   - Redirect inappropriate behaviors   Outcome: Adequate for Discharge

## 2023-09-28 NOTE — NURSING NOTE
Pt utilizes Trazodone to fall asleep. Appeared to have slept through out the shift, no distress/difficulty observed thereafter.
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
## 2023-09-28 NOTE — CASE MANAGEMENT
CM met with Pt to review d/c plans. Pt in agreement with calling or going online to [facility] [address] to schedule a therapy appointment. CM also showed Pt a list of therapist from psychology today and [facility]. CM & Pt discussed her return to work on 10/10 & Pt had no questions. Pt read & signed [employer] waiver.

## 2024-02-01 ENCOUNTER — TELEPHONE (OUTPATIENT)
Age: 49
End: 2024-02-01

## 2024-02-22 ENCOUNTER — OFFICE VISIT (OUTPATIENT)
Dept: PLASTIC SURGERY | Facility: CLINIC | Age: 49
End: 2024-02-22
Payer: COMMERCIAL

## 2024-02-22 VITALS
SYSTOLIC BLOOD PRESSURE: 136 MMHG | TEMPERATURE: 97.9 F | BODY MASS INDEX: 24.22 KG/M2 | HEART RATE: 85 BPM | HEIGHT: 71 IN | DIASTOLIC BLOOD PRESSURE: 88 MMHG | WEIGHT: 173 LBS

## 2024-02-22 DIAGNOSIS — H02.403 PTOSIS OF EYELID, BILATERAL: Primary | ICD-10-CM

## 2024-02-22 PROCEDURE — 99215 OFFICE O/P EST HI 40 MIN: CPT | Performed by: STUDENT IN AN ORGANIZED HEALTH CARE EDUCATION/TRAINING PROGRAM

## 2024-02-22 RX ORDER — PROGESTERONE 200 MG/1
CAPSULE ORAL
COMMUNITY
Start: 2024-02-20

## 2024-02-25 NOTE — PROGRESS NOTES
Plastic Surgery Consult     Reason for visit: Patient presents for further discussion for facial feminization    HPI from 2/22/24  Patient is 49 y/o MTF transgendered woman who is well-known to me and presents for further discussion for facial feminization. She continues to have nasal obstructive symptoms and would like both a functional and cosmetic procedure to improve the feminization of her nose. She states that she would like to reduce the dorsal hump, decrease the projection, and increase the rotation to raise the tip.     In addition, she is interested in upper blepharoplasty. Her upper lateral eyelids overhang and are supported by her eyelashes. Patient states that she has upper lateral visual field deficits which is apparent. I discussed that insurance may cover the upper blepharoplasty because the excess skin is causing a functional visual deficit.     Patient is also inquiring about gender affirming top surgery. Patient has been on both estrogen and progesterone for over two years and has noted her breasts have remained the same size. She is interested in gender affirming breast augmentation.    Nasal exam:  Deviated dorsal septum to the left  Asymmetry and bulbous LLCs  +[place] bilaterally, improvement in airway passage with internal stenting with cotton swab  Intranasal exam, S-shaped septum    Bilateral upper lid significant excess dermatochalasis with upper lashes supporting excess skin in the lateral visual field.   No lid ptosis    A/P:45 y/o MTF transgendered female who presents for facial feminization with highest priority of the nose as it is both a functional and cosmetic issue. She is also interested in gender affirming top surgery and upper bleph due to functional reasons.  -Referred to ophthalmologist for visual field studies  -Discussed open rhinoplasty for deviated septum as well as changes for facial feminization of nose  -Patient still smokes. I instructed her that she must be off of  nicotine for at least 6-8 weeks prior to any surgical intervention. When she has stopped all nicotine products, I instructed her to return to clinic and we will discuss proceeding with surgical intervention. Patient acknowledged.  -Spent 45 minutes in consultation with patient. Greater than 50% of the total time was spent obtaining history, evaluation, performing exam, discussion of management options including post-operative care, answering patient's questions and concerns, chart reviewing, and documentation    Kirk Massey MD   [facility]astic [facility]   [address] [place] [address], [address]   [place] [address]   Office: [phone]      HPI from 2/18/24  Patient is a 47 y/o MTF transgendered woman who presents for facial feminization. She has multiple areas that she would like addressed including her hairline, forehead, nose, chin, and chest. I asked her what is her primary area of focus and she mentioned her nose as she also has some obstructive sleep symptoms. She is getting a sleep study in the next week. She states that she suffered multiple traumas to her nose as child and has had some nasal obstructive symptoms that are constant and unchanged with medication or seasonality. There is obvious deviation of her nose. She is interested in making it more feminine by reducing the dorsal hump, less projected. She presents for further management.     ROS: 12 pt ROS negative, except as otherwise noted in HPI.     PMH: none  FamHx: none  SurgHx: none  SocHx: on/off smoking for 10 years, social ETOH  Meds: hormones, finasteride  Allergies: PCN, sulfa     PE:  There were no vitals filed for this visit.     General: NC/AT, breathing comfortably on RA  Neuro: CN II-XII grossly intact, symmetric reflexes  HEENT: PERRLA, EOMI, external ears normal, no lesions or deformities, neck supple, trachea midline  Respiratory: CTAB, normal respiratory effort  Cardio: RRR, normal S1, S2, no murmur, rubs, gallops  GI:  soft, non-tender, non-distended  Extremities/MSK: normal alignment, mobility, gait, no edema  Skin: no rashes, lesions, subcutaneous nodules     Nasal exam:  Deviated dorsal septum to the left  Asymmetry and bulbous LLCs  +[place] bilaterally, improvement in airway passage with internal stenting with cotton swab  Intranasal exam, S-shaped septum     A/P:45 y/o MTF transgendered female who presents for facial feminization with highest priority of the nose as it is both a functional and cosmetic issue.  -I discussed with her that we can straighten the nose, place  grafts to stent the internal nasal valves to improve breathing, and can make the nose more feminine by reducing dorsal hump, cephalic trim, and underrotating tip.  -Most importantly, I discussed the need to stop smoking. I instructed her to stop smoking for at least 6 weeks prior at which time she will be tested with cotinine test prior to scheduling any surgical intervention.              Kirk Massey MD   [facility]astic and Re[facility]   [address] [place] [address], [address]   [place] [address]   Office: [phone]

## 2024-12-01 DIAGNOSIS — M25.552 LEFT HIP PAIN: Primary | ICD-10-CM

## 2024-12-05 ENCOUNTER — OFFICE VISIT (OUTPATIENT)
Dept: OBGYN CLINIC | Facility: CLINIC | Age: 49
End: 2024-12-05
Payer: COMMERCIAL

## 2024-12-05 ENCOUNTER — APPOINTMENT (OUTPATIENT)
Dept: RADIOLOGY | Facility: CLINIC | Age: 49
End: 2024-12-05
Payer: COMMERCIAL

## 2024-12-05 VITALS
HEART RATE: 67 BPM | DIASTOLIC BLOOD PRESSURE: 75 MMHG | HEIGHT: 71 IN | BODY MASS INDEX: 24.75 KG/M2 | WEIGHT: 176.8 LBS | SYSTOLIC BLOOD PRESSURE: 147 MMHG

## 2024-12-05 DIAGNOSIS — M21.70 LEG LENGTH DISCREPANCY: ICD-10-CM

## 2024-12-05 DIAGNOSIS — M25.552 LEFT HIP PAIN: ICD-10-CM

## 2024-12-05 DIAGNOSIS — M12.552 TRAUMATIC ARTHRITIS OF LEFT HIP: ICD-10-CM

## 2024-12-05 DIAGNOSIS — M16.12 PRIMARY OSTEOARTHRITIS OF LEFT HIP: Primary | ICD-10-CM

## 2024-12-05 PROCEDURE — 99214 OFFICE O/P EST MOD 30 MIN: CPT | Performed by: ORTHOPAEDIC SURGERY

## 2024-12-05 PROCEDURE — 73502 X-RAY EXAM HIP UNI 2-3 VIEWS: CPT

## 2024-12-05 RX ORDER — CELECOXIB 100 MG/1
100 CAPSULE ORAL 2 TIMES DAILY
Qty: 60 CAPSULE | Refills: 0 | Status: SHIPPED | OUTPATIENT
Start: 2024-12-05

## 2024-12-05 NOTE — PROGRESS NOTES
Orthopaedics Office Visit - Post-op Patient Visit    ASSESSMENT/PLAN:    Assessment:   2 years 4 months s/p left femoral neck fracture ORIF, DOS 22   left hip posttraumatic arthritis - chronic, exacerbated in recent months  Increased hip pain status post mechanical fall  Mild leg length discrepancy,  left shorter than right        Plan:   Weight bearing as tolerated left lower extremity    Begin celebrex as directed   Offered patient injection under fluoroscopy,  patient declined at this time  Begin physical therapy as directed   Over the counter analgesics as needed / directed   Ice / heat as directed   Follow up 2 months       To Do Next Visit:  Evaluate left hip pain     _____________________________________________________  CHIEF COMPLAINT:  Chief Complaint   Patient presents with    Left Hip - Follow-up     She's been unable to exercise recently - causes her discomfort. Unsure if she tweaked it?         SUBJECTIVE:  Kath Perez is a 49 y.o. adult who presents  2 years 4 months s/p left femoral neck fracture ORIF, DOS 22.  Patient states that her hip is doing well until approximate 1 month ago.  Patient states that she fell landing on her left side causing and had increased pain in her left hip.  Patient states she is able to stand and weight-bear after the initial injury.  Patient states that she continues to have anterior and lateral hip pain that becomes worse with weightbearing and exercise.  Patient states that she has decreased her activity level secondary to her hip pain.  Patient has been taking Aleve as needed for pain.  Patient offers no other complaints at this time.      SOCIAL HISTORY:  Social History     Tobacco Use    Smoking status: Some Days     Current packs/day: 0.00     Average packs/day: 0.5 packs/day for 20.0 years (10.0 ttl pk-yrs)     Types: Cigarettes     Last attempt to quit: 3/14/2023     Years since quittin.7    Smokeless tobacco: Current    Tobacco comments:     vape  "  Vaping Use    Vaping status: Every Day    Substances: Nicotine   Substance Use Topics    Alcohol use: Yes     Alcohol/week: 4.0 standard drinks of alcohol     Types: 2 Cans of beer, 2 Shots of liquor per week     Comment: social    Drug use: Not Currently     Types: Marijuana     Comment: occassional/ 4 days ago       MEDICATIONS:    Current Outpatient Medications:     B-D 3CC LUER-DAPHNIE SYR 08HT8-5/2 18G X 1-1/2\" 3 ML MISC, USE TO DRAW UP THE DELESTROGEN EVERY 7 DAYS, Disp: , Rfl:     bicalutamide (CASODEX) 50 mg tablet, Take 50 mg by mouth daily, Disp: , Rfl:     busPIRone (BUSPAR) 5 mg tablet, Take 1 tablet (5 mg total) by mouth 3 (three) times a day, Disp: 90 tablet, Rfl: 0    estradiol valerate (DELESTROGEN) 20 MG/ML injection, INJECT 0.2 ML INTRAMUSCULARLY EVERY 7 DAYS, Disp: , Rfl:     finasteride (PROSCAR) 5 mg tablet, Take 5 mg by mouth daily, Disp: , Rfl:     Multiple Vitamins-Minerals (multivitamin with minerals) tablet, Take 1 tablet by mouth daily, Disp: , Rfl:     Progesterone 200 MG CAPS, , Disp: , Rfl:     traZODone (DESYREL) 100 mg tablet, Take 1 tablet (100 mg total) by mouth daily at bedtime as needed (insomnia), Disp: 30 tablet, Rfl: 0    progesterone 200 mg vaginal suppository, Insert 1 suppository (200 mg total) into the vagina in the morning (Patient not taking: Reported on 12/5/2024), Disp: 30 suppository, Rfl: 0    _____________________________________________________  PHYSICAL EXAMINATION:  Vital signs: /75   Pulse 67   Ht 5' 11\" (1.803 m)   Wt 80.2 kg (176 lb 12.8 oz)   BMI 24.66 kg/m²   General: No acute distress, awake and alert  Psychiatric: Mood and affect appear appropriate  HEENT: Trachea Midline, No torticollis, no apparent facial trauma  Cardiovascular: No audible murmurs; Extremities appear perfused  Pulmonary: No audible wheezing or stridor  Skin: No open lesions; see further details (if any) below      MUSCULOSKELETAL EXAMINATION:  Left Hip examination:  Patient " "sitting comfortably in the office in no apparent distress   No acute visible abnormalities present in the left lower extremity  Tenderness palpation noted over the anterior aspect of the left hip.   No other bony or soft tissue tenderness to palpation noted at this time.  Near full range of motion of the left hip appreciated with pain associated with internal and external rotation of the hip generating pain in the groin region  NV intact    _____________________________________________________  STUDIES REVIEWED:  I personally reviewed the images obtained in office today and my independent interpretation is as follows:  Left hip x-ray 2 views:  Status post operative fixation of left hip.  Cam deformity present.  Moderate hip OA present        PROCEDURES PERFORMED:  No procedures were performed at this time.                   Ba Bueno PA-C  - assisting  Eleazar Reid MD                                        Portions of the record may have been created with voice recognition software.  Occasional wrong word or \"sound a like\" substitutions may have occurred due to the inherent limitations of voice recognition software.  Read the chart carefully and recognize, using context, where substitutions have occurred.    "

## 2025-07-24 ENCOUNTER — OFFICE VISIT (OUTPATIENT)
Dept: URGENT CARE | Age: 50
End: 2025-07-24
Payer: COMMERCIAL

## 2025-07-24 ENCOUNTER — APPOINTMENT (OUTPATIENT)
Dept: LAB | Age: 50
End: 2025-07-24
Payer: COMMERCIAL

## 2025-07-24 VITALS
HEART RATE: 90 BPM | RESPIRATION RATE: 18 BRPM | TEMPERATURE: 98.4 F | OXYGEN SATURATION: 99 % | DIASTOLIC BLOOD PRESSURE: 67 MMHG | SYSTOLIC BLOOD PRESSURE: 150 MMHG

## 2025-07-24 DIAGNOSIS — R21 RASH: Primary | ICD-10-CM

## 2025-07-24 DIAGNOSIS — R21 RASH: ICD-10-CM

## 2025-07-24 DIAGNOSIS — J06.9 VIRAL URI: ICD-10-CM

## 2025-07-24 PROCEDURE — 86618 LYME DISEASE ANTIBODY: CPT

## 2025-07-24 PROCEDURE — G0382 LEV 3 HOSP TYPE B ED VISIT: HCPCS | Performed by: STUDENT IN AN ORGANIZED HEALTH CARE EDUCATION/TRAINING PROGRAM

## 2025-07-24 PROCEDURE — 86617 LYME DISEASE ANTIBODY: CPT

## 2025-07-24 PROCEDURE — 36415 COLL VENOUS BLD VENIPUNCTURE: CPT

## 2025-07-24 NOTE — LETTER
July 24, 2025     Patient: Kath Perez   YOB: 1975   Date of Visit: 7/24/2025       To Whom it May Concern:    Kath Perez was seen in my clinic on 7/24/2025.  Please excuse her from work today 7/24/2025.    If you have any questions or concerns, please don't hesitate to call.         Sincerely,          Tri Quiñones, DO

## 2025-07-24 NOTE — PROGRESS NOTES
[facility]  Name: Kath Perez      : 1975      MRN: [id number]  Encounter Provider: Tri Quiñones DO  Encounter Date: 2025   Encounter department: [facility] [place]  :  Assessment & Plan  Rash    Orders:    Lyme Total AB W Reflex to IGM/IGG; Future    Viral URI             Patient Instructions  It appears that the symptoms that you are having may be coming from a viral upper respiratory infection.  To rule out Lyme disease as a cause for your symptoms and rash, I am placing an order for Lyme blood test.  Please get this completed at the lab, results will come back to us and if it is positive for Lyme, we will call you to start antibiotics.    For viral upper respiratory infection, I recommend good hydration, rest, warm tea with honey, warm salt water gargles, Tylenol as needed.  Your symptoms may feel like they are a pressure dressing for the next 5 days and then gradually improving by day 7.  If you feel that your symptoms are not adequately improving in the expected timeframe, I recommend follow-up check with your PCP.    If you have any severe worsening symptoms, please go the ER.    Follow up with PCP in 3-5 days.  Proceed to  ER if symptoms worsen.    If tests are performed, our office will contact you with results only if changes need to made to the care plan discussed with you at the visit. [facility]    Chief Complaint:   Chief Complaint   Patient presents with    Insect Bite     Bug bite on left inner thigh with swelling, fever, and body aches that was noticed 3.5 weeks ago. Symptoms getting worse this week.      History of Present Illness   She was working in the garden about 3-1/2 weeks ago, noticed possible insect/spider bite to the left inner thigh, at first was very red, swollen, had swelling has been going down but still having some pinkness in the area.  She was concerned for possible Lyme as she started with some  "symptoms her last 24 hours including sore throat, runny nose, muscle aches, fatigue, subjective sweats and chills, no measured fever so far.  She did not see an embedded tick.  Her partner was recently diagnosed with Lyme presenting with Bell's palsy.    Insect Bite          Review of Systems   All other systems reviewed and are negative.    Past Medical History   Past Medical History[1]  Past Surgical History[2]  Family History[3]  she reports that she has been smoking cigarettes. She has a 10 pack-year smoking history. She uses smokeless tobacco. She reports current alcohol use of about 4.0 standard drinks of alcohol per week. She reports that she does not currently use drugs after having used the following drugs: Marijuana.  Current Outpatient Medications   Medication Instructions    B-D 3CC LUER-DAPHNIE SYR 19BE6-8/2 18G X 1-1/2\" 3 ML MISC     bicalutamide (CASODEX) 50 mg, Daily    busPIRone (BUSPAR) 5 mg, Oral, 3 times daily    celecoxib (CELEBREX) 100 mg, Oral, 2 times daily    estradiol valerate (DELESTROGEN) 20 MG/ML injection     finasteride (PROSCAR) 5 mg, Daily    Multiple Vitamins-Minerals (multivitamin with minerals) tablet 1 tablet, Daily    Progesterone 200 MG CAPS     progesterone 200 mg, Vaginal, Daily    traZODone (DESYREL) 100 mg, Oral, Daily at bedtime PRN   Allergies[4]     Objective   /67   Pulse 90   Temp 98.4 °F (36.9 °C)   Resp 18   SpO2 99%      Physical Exam  Vitals and nursing note reviewed.   Constitutional:       General: She is not in acute distress.     Appearance: She is well-developed.   HENT:      Head: Normocephalic and atraumatic.      Right Ear: Tympanic membrane, ear canal and external ear normal.      Left Ear: Tympanic membrane, ear canal and external ear normal.      Nose: Rhinorrhea present.      Mouth/Throat:      Pharynx: Posterior oropharyngeal erythema present.     Eyes:      Conjunctiva/sclera: Conjunctivae normal.      Pupils: Pupils are equal, round, and " "reactive to light.       Cardiovascular:      Rate and Rhythm: Normal rate and regular rhythm.      Heart sounds: No murmur heard.  Pulmonary:      Effort: Pulmonary effort is normal. No respiratory distress.      Breath sounds: Normal breath sounds.   Abdominal:      Palpations: Abdomen is soft.      Tenderness: There is no abdominal tenderness.     Musculoskeletal:         General: No swelling.      Cervical back: Neck supple.     Skin:     General: Skin is warm and dry.      Capillary Refill: Capillary refill takes less than 2 seconds.      Comments: There is a circular area of resolving erythema to the right inner thigh, approximately 8cm diameter, blanchable, not raised, no warmth/induration     Neurological:      Mental Status: She is alert.     Psychiatric:         Mood and Affect: Mood normal.         Portions of the record may have been created with voice recognition software.  Occasional wrong word or \"sound a like\" substitutions may have occurred due to the inherent limitations of voice recognition software.  Read the chart carefully and recognize, using context, where substitutions have occurred.       [1]   Past Medical History:  Diagnosis Date    Anxiety     Depression     HPV (human papilloma virus) infection     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     Sleep apnea    [2]   Past Surgical History:  Procedure Laterality Date    HIP SURGERY      ORIF HIP FRACTURE Left 07/07/2022    Procedure: OPEN REDUCTION W/ INTERNAL FIXATION (ORIF) LEFT FEMORAL NECK FRACTURE;  Surgeon: Eleazar Reid MD;  Location: [facility];  Service: Orthopedics    MT ANRCT XM SURG REQ ANES GENERAL SPI/EDRL DX N/A 03/22/2023    Procedure: EXAM UNDER ANESTHESIA (EUA);  Surgeon: James Gonzalez MD;  Location: [facility];  Service: Colorectal    MT DSTRJ LESION ANUS EXTENSIVE N/A 03/22/2023    Procedure: EXCISION FULGARATION CONDYLOMA ANAL/RECTAL;  Surgeon: James Gonzalez MD;  Location: [facility];  Service: Colorectal "   [3]   Family History  Problem Relation Name Age of Onset    Cancer Mother Poly godoy            [4]   Allergies  Allergen Reactions    Penicillins Rash and Fever    Sulfa Antibiotics Rash and Fever

## 2025-07-24 NOTE — PATIENT INSTRUCTIONS
It appears that the symptoms that you are having may be coming from a viral upper respiratory infection.  To rule out Lyme disease as a cause for your symptoms and rash, I am placing an order for Lyme blood test.  Please get this completed at the lab, results will come back to us and if it is positive for Lyme, we will call you to start antibiotics.    For viral upper respiratory infection, I recommend good hydration, rest, warm tea with honey, warm salt water gargles, Tylenol as needed.  Your symptoms may feel like they are a pressure dressing for the next 5 days and then gradually improving by day 7.  If you feel that your symptoms are not adequately improving in the expected timeframe, I recommend follow-up check with your PCP.    If you have any severe worsening symptoms, please go the ER.

## 2025-07-25 ENCOUNTER — RESULTS FOLLOW-UP (OUTPATIENT)
Dept: URGENT CARE | Age: 50
End: 2025-07-25

## 2025-07-25 LAB
B BURGDOR IGG SERPL QL IA: POSITIVE
B BURGDOR IGG+IGM SER QL IA: POSITIVE
B BURGDOR IGM SERPL QL IA: POSITIVE

## (undated) DEVICE — PAD GROUNDING ADULT

## (undated) DEVICE — PENCIL ELECTROSURG E-Z CLEAN -0035H

## (undated) DEVICE — DRESSING MEPILEX AG BORDER 4 X 8 IN

## (undated) DEVICE — GLOVE SRG BIOGEL 7.5

## (undated) DEVICE — GOWN ,SIRUS ,NONREINFORCED 4XL: Brand: MEDLINE

## (undated) DEVICE — SUT VICRYL PLUS 0 UR-6 27IN VCP603H

## (undated) DEVICE — DRAPE C-ARMOUR

## (undated) DEVICE — 2.8MM GUIDE WIRE WITH FLUTES 300MM/150MM CALIBRATION

## (undated) DEVICE — 6617 IOBAN II PATIENT ISOLATION DRAPE 5/BX,4BX/CS: Brand: STERI-DRAPE™ IOBAN™ 2

## (undated) DEVICE — ELECTRODE BLADE MOD E-Z CLEAN 2.5IN 6.4CM -0012M

## (undated) DEVICE — CHLORAPREP HI-LITE 26ML ORANGE

## (undated) DEVICE — OCCLUSIVE GAUZE STRIP,3% BISMUTH TRIBROMOPHENATE IN PETROLATUM BLEND: Brand: XEROFORM

## (undated) DEVICE — 3M™ IOBAN™ 2 ANTIMICROBIAL INCISE DRAPE 6650EZ: Brand: IOBAN™ 2

## (undated) DEVICE — PLUMEPEN PRO 10FT

## (undated) DEVICE — GLOVE INDICATOR PI UNDERGLOVE SZ 8 BLUE

## (undated) DEVICE — 5.0MM CANNULATED DRILL BIT LARGE QC/300MM

## (undated) DEVICE — PREMIUM DRY TRAY LF: Brand: MEDLINE INDUSTRIES, INC.

## (undated) DEVICE — DISPOSABLE BRIEF/UNDERWEAR

## (undated) DEVICE — INTENDED FOR TISSUE SEPARATION, AND OTHER PROCEDURES THAT REQUIRE A SHARP SURGICAL BLADE TO PUNCTURE OR CUT.: Brand: BARD-PARKER SAFETY BLADES SIZE 15, STERILE

## (undated) DEVICE — TUBING SUCTION 5MM X 12 FT

## (undated) DEVICE — INTENDED FOR TISSUE SEPARATION, AND OTHER PROCEDURES THAT REQUIRE A SHARP SURGICAL BLADE TO PUNCTURE OR CUT.: Brand: BARD-PARKER SAFETY BLADES SIZE 10, STERILE

## (undated) DEVICE — POOLE SUCTION HANDLE: Brand: CARDINAL HEALTH

## (undated) DEVICE — SUT VICRYL PLUS 0 CTB-1 27 IN VCPB260H

## (undated) DEVICE — NEEDLE HYPO 22G X 1-1/2 IN

## (undated) DEVICE — SUT VICRYL PLUS 2-0 CTB-1 27 IN VCPB259H

## (undated) DEVICE — STERILE SURGICAL LUBRICANT,  TUBE: Brand: SURGILUBE

## (undated) DEVICE — GLOVE SRG BIOGEL ORTHOPEDIC 8

## (undated) DEVICE — DRESSING MEPILEX AG BORDER 4 X 4 IN

## (undated) DEVICE — GELFOAM 100

## (undated) DEVICE — 3M™ TEGADERM™ TRANSPARENT FILM DRESSING FRAME STYLE, 1628, 6 IN X 8 IN (15 CM X 20 CM), 10/CT 8CT/CASE: Brand: 3M™ TEGADERM™

## (undated) DEVICE — DRAPE C-ARM X-RAY

## (undated) DEVICE — 2.5MM THREADED GUIDE WIRE SPADE POINT 230MM

## (undated) DEVICE — DRAPE SURGIKIT SADDLE BAG

## (undated) DEVICE — 3000CC GUARDIAN II: Brand: GUARDIAN

## (undated) DEVICE — PROXIMATE PLUS MD MULTI-DIRECTIONAL RELEASE SKIN STAPLERS CONTAINS 35 STAINLESS STEEL STAPLES APPROXIMATE CLOSED DIMENSIONS: 6.9MM X 3.9MM WIDE: Brand: PROXIMATE

## (undated) DEVICE — BETHLEHEM UNIVERSAL MINOR GEN: Brand: CARDINAL HEALTH

## (undated) DEVICE — TIBURON SPLIT SHEET: Brand: CONVERTORS

## (undated) DEVICE — ALL PURPOSE SPONGES,NONWOVEN, 4 PLY: Brand: CURITY

## (undated) DEVICE — SUT VICRYL 3-0 SH 27 IN J416H

## (undated) DEVICE — STERILE ORIF HIP PACK: Brand: CARDINAL HEALTH

## (undated) DEVICE — STERILE CYSTO PACK: Brand: CARDINAL HEALTH

## (undated) DEVICE — GAUZE SPONGES,16 PLY: Brand: CURITY

## (undated) DEVICE — 3.2MM DRILL BIT/QC/145MM

## (undated) DEVICE — POV-IOD SOLUTION 4OZ BT

## (undated) DEVICE — GLOVE INDICATOR PI UNDERGLOVE SZ 7.5 BLUE

## (undated) DEVICE — BASIC SINGLE BASIN 2-LF: Brand: MEDLINE INDUSTRIES, INC.

## (undated) DEVICE — SPONGE PVP SCRUB WING STERILE

## (undated) DEVICE — 3M™ DURAPORE™ SURGICAL TAPE 1538-3, 3 INCH X 10 YARD (7,5CM X 9,1M), 4 ROLLS/BOX: Brand: 3M™ DURAPORE™